# Patient Record
Sex: FEMALE | Race: WHITE | Employment: OTHER | URBAN - METROPOLITAN AREA
[De-identification: names, ages, dates, MRNs, and addresses within clinical notes are randomized per-mention and may not be internally consistent; named-entity substitution may affect disease eponyms.]

---

## 2017-01-03 ENCOUNTER — TELEPHONE (OUTPATIENT)
Dept: PALLATIVE CARE | Age: 82
End: 2017-01-03

## 2017-01-03 ENCOUNTER — NURSE NAVIGATOR (OUTPATIENT)
Dept: PALLATIVE CARE | Age: 82
End: 2017-01-03

## 2017-01-03 NOTE — PROGRESS NOTES
[facility]  Palliative Medicine Office  Nursing Note  [phone]  Fax [phone]    Patient Name: Fredy Sal  YOB: 1929      1/3/2017    Verified  and address with  Reid Cash her son as identifiers. Calling son back after he left several messages about Mrs. Patino having diarrhea for several days, when I called him back he stated patient did not have diarrhea and was going fine. He went on to tell me that she had diarrhea due to the fact he was feeding her the wrong type of foods. He was rambling about many issues, his diarrhea, his sister feeling he is not caring for his mom correctly, how \"they\" want to take his mom and put her in a home and what did i know about that. I tried to focus him on the issue at hand and the fact that it is better now. When he has medical issues I advised him to call Mrs. Patino PCA when medical issues arise with her         Tian Llamas, RN Nurse home visit   Palliative Medicine        No future appointments.

## 2017-01-03 NOTE — TELEPHONE ENCOUNTER
Patient's son, Cary Clark, called and left 3 voicemails on machine advising that he needs advice regarding patient. He states on machine that \"Patient has diarrhea and this has been going on for 3 days. He does not know what to do. \"  Calling for advise.

## 2017-01-06 RX ORDER — IPRATROPIUM BROMIDE AND ALBUTEROL SULFATE 2.5; .5 MG/3ML; MG/3ML
SOLUTION RESPIRATORY (INHALATION)
Qty: 270 ML | Refills: 0 | Status: SHIPPED | OUTPATIENT
Start: 2017-01-06 | End: 2017-03-01 | Stop reason: SDUPTHER

## 2017-01-23 ENCOUNTER — PATIENT OUTREACH (OUTPATIENT)
Dept: FAMILY MEDICINE CLINIC | Age: 82
End: 2017-01-23

## 2017-01-23 NOTE — PROGRESS NOTES
Pt. Being followed by NN in Palliative care. See her notes. SW with Taty Brennan [facility] has also made contact with pt. And APS, see her extensive notes. NP has been made aware of above in the past and has had discussions with the SONIYA Rendon

## 2017-01-27 ENCOUNTER — DOCUMENTATION ONLY (OUTPATIENT)
Dept: PALLATIVE CARE | Age: 82
End: 2017-01-27

## 2017-01-27 NOTE — PROGRESS NOTES
Telephone Call    Purpose of Call:  Return Son/Caregiver Hakeem Cool) Call    This \[facility]\""W returned call from pt's son Elsi Gomez). Described self as a \"wreck. \" Described in detail concerns about mother's care---wants to hire a home health aide to help; has been in contact with PCP with concerns about rash mother has; upset over lack of support from his siblings. \"No one will talk to me. \" Fears brother Abhay Smith ([employer] [facility] and [place]) is going to sell mothers house. Anxiety over being homeless. These concerns are ongoing. Mr. Dakotah Nguyen feels powerless to deal with these stresses. Redirected him to PCP. Pt no longer to be followed by Palliative. At this time, she no longer meets guidelines of clinic's COPE model (Care decisions, Overwhelming symptoms, Psychosocial disress, and End-stage disease). Plan:   1. Be available to consult with MSW (Manoj Godinez), upon request.      Rosalee Jain.  Silke Peace., [facility]  [phone]

## 2017-01-28 RX ORDER — ACETAMINOPHEN AND CODEINE PHOSPHATE 300; 30 MG/1; MG/1
1 TABLET ORAL
Qty: 30 TAB | Refills: 0 | OUTPATIENT
Start: 2017-01-28 | End: 2017-03-01 | Stop reason: SDUPTHER

## 2017-01-30 ENCOUNTER — HOME HEALTH ADMISSION (OUTPATIENT)
Dept: HOME HEALTH SERVICES | Facility: HOME HEALTH | Age: 82
End: 2017-01-30
Payer: MEDICARE

## 2017-02-02 ENCOUNTER — HOME CARE VISIT (OUTPATIENT)
Dept: SCHEDULING | Facility: HOME HEALTH | Age: 82
End: 2017-02-02
Payer: MEDICARE

## 2017-02-02 DIAGNOSIS — Z74.2 NEED FOR HOME HEALTH CARE: Primary | ICD-10-CM

## 2017-02-02 PROCEDURE — 3331090001 HH PPS REVENUE CREDIT

## 2017-02-02 PROCEDURE — 3331090003 HH PPS REVENUE ADJ

## 2017-02-02 PROCEDURE — 400013 HH SOC

## 2017-02-02 PROCEDURE — G0151 HHCP-SERV OF PT,EA 15 MIN: HCPCS

## 2017-02-02 PROCEDURE — 3331090002 HH PPS REVENUE DEBIT

## 2017-02-03 ENCOUNTER — TELEPHONE (OUTPATIENT)
Dept: FAMILY MEDICINE CLINIC | Age: 82
End: 2017-02-03

## 2017-02-03 PROCEDURE — 3331090002 HH PPS REVENUE DEBIT

## 2017-02-03 PROCEDURE — 3331090001 HH PPS REVENUE CREDIT

## 2017-02-03 NOTE — TELEPHONE ENCOUNTER
Outbound call to Italo Michelle RN [facility]), pt identifiers (name & MRN) verified per HIPAA policy. Acknowledged self, role and nature of the call, RN acknowledged understanding. Clinician informed RN of new referral placed in CC for the [facility].      Per Italo Michelle [facility] referrals have been suspended for now per Dr. Sarthak Kinsey. Don't have the staff to accommodate work load\".   Writer verbally agreed to check back w/RN re: referral to the [facility] [address] [facility] TYSON Beltran

## 2017-02-04 PROCEDURE — 3331090002 HH PPS REVENUE DEBIT

## 2017-02-04 PROCEDURE — 3331090001 HH PPS REVENUE CREDIT

## 2017-02-05 PROCEDURE — 3331090001 HH PPS REVENUE CREDIT

## 2017-02-05 PROCEDURE — 3331090002 HH PPS REVENUE DEBIT

## 2017-02-06 PROCEDURE — 3331090001 HH PPS REVENUE CREDIT

## 2017-02-06 PROCEDURE — 3331090002 HH PPS REVENUE DEBIT

## 2017-02-07 PROCEDURE — 3331090001 HH PPS REVENUE CREDIT

## 2017-02-07 PROCEDURE — 3331090002 HH PPS REVENUE DEBIT

## 2017-02-08 PROCEDURE — 3331090001 HH PPS REVENUE CREDIT

## 2017-02-08 PROCEDURE — 3331090002 HH PPS REVENUE DEBIT

## 2017-02-09 PROCEDURE — 3331090002 HH PPS REVENUE DEBIT

## 2017-02-09 PROCEDURE — 3331090001 HH PPS REVENUE CREDIT

## 2017-02-10 PROCEDURE — 3331090002 HH PPS REVENUE DEBIT

## 2017-02-10 PROCEDURE — 3331090001 HH PPS REVENUE CREDIT

## 2017-02-11 PROCEDURE — 3331090001 HH PPS REVENUE CREDIT

## 2017-02-11 PROCEDURE — 3331090002 HH PPS REVENUE DEBIT

## 2017-02-12 PROCEDURE — 3331090002 HH PPS REVENUE DEBIT

## 2017-02-12 PROCEDURE — 3331090001 HH PPS REVENUE CREDIT

## 2017-02-13 PROCEDURE — 3331090002 HH PPS REVENUE DEBIT

## 2017-02-13 PROCEDURE — 3331090001 HH PPS REVENUE CREDIT

## 2017-02-14 ENCOUNTER — TELEPHONE (OUTPATIENT)
Dept: PALLATIVE CARE | Age: 82
End: 2017-02-14

## 2017-02-14 PROCEDURE — 3331090001 HH PPS REVENUE CREDIT

## 2017-02-14 PROCEDURE — 3331090002 HH PPS REVENUE DEBIT

## 2017-02-14 NOTE — TELEPHONE ENCOUNTER
Pt's son Terrence White called asking for transportation phone number for [facility] [employer]. States he needs to call and arrange transportation through [facility] [employer] for his mother to go to the MD.  During the call son says he found the [facility] [employer] card with the number on it. Nurse inquired about pt's homebound status. Pt had been referred to Home Based Primary Care on 2/3/17 by Mendez Zurita.  Son states he can use the wheelchair and get his mother to medical appts \"no problem. \"

## 2017-02-15 PROCEDURE — 3331090002 HH PPS REVENUE DEBIT

## 2017-02-15 PROCEDURE — 3331090001 HH PPS REVENUE CREDIT

## 2017-02-16 PROCEDURE — 3331090001 HH PPS REVENUE CREDIT

## 2017-02-16 PROCEDURE — 3331090002 HH PPS REVENUE DEBIT

## 2017-02-17 PROCEDURE — 3331090001 HH PPS REVENUE CREDIT

## 2017-02-17 PROCEDURE — 3331090002 HH PPS REVENUE DEBIT

## 2017-02-18 PROCEDURE — 3331090002 HH PPS REVENUE DEBIT

## 2017-02-18 PROCEDURE — 3331090001 HH PPS REVENUE CREDIT

## 2017-02-19 PROCEDURE — 3331090001 HH PPS REVENUE CREDIT

## 2017-02-19 PROCEDURE — 3331090002 HH PPS REVENUE DEBIT

## 2017-02-20 PROCEDURE — 3331090002 HH PPS REVENUE DEBIT

## 2017-02-20 PROCEDURE — 3331090001 HH PPS REVENUE CREDIT

## 2017-02-21 ENCOUNTER — HOME CARE VISIT (OUTPATIENT)
Dept: HOME HEALTH SERVICES | Facility: HOME HEALTH | Age: 82
End: 2017-02-21
Payer: MEDICARE

## 2017-02-21 PROCEDURE — 3331090002 HH PPS REVENUE DEBIT

## 2017-02-21 PROCEDURE — 3331090001 HH PPS REVENUE CREDIT

## 2017-02-22 PROCEDURE — 3331090001 HH PPS REVENUE CREDIT

## 2017-02-22 PROCEDURE — 3331090002 HH PPS REVENUE DEBIT

## 2017-02-23 PROCEDURE — 3331090001 HH PPS REVENUE CREDIT

## 2017-02-23 PROCEDURE — 3331090002 HH PPS REVENUE DEBIT

## 2017-02-24 PROCEDURE — 3331090001 HH PPS REVENUE CREDIT

## 2017-02-24 PROCEDURE — 3331090002 HH PPS REVENUE DEBIT

## 2017-02-25 PROCEDURE — 3331090002 HH PPS REVENUE DEBIT

## 2017-02-25 PROCEDURE — 3331090001 HH PPS REVENUE CREDIT

## 2017-02-26 PROCEDURE — 3331090002 HH PPS REVENUE DEBIT

## 2017-02-26 PROCEDURE — 3331090001 HH PPS REVENUE CREDIT

## 2017-02-27 PROCEDURE — 3331090002 HH PPS REVENUE DEBIT

## 2017-02-27 PROCEDURE — 3331090001 HH PPS REVENUE CREDIT

## 2017-02-28 PROCEDURE — 3331090002 HH PPS REVENUE DEBIT

## 2017-02-28 PROCEDURE — 3331090001 HH PPS REVENUE CREDIT

## 2017-03-01 PROCEDURE — 3331090002 HH PPS REVENUE DEBIT

## 2017-03-01 PROCEDURE — 3331090001 HH PPS REVENUE CREDIT

## 2017-03-01 RX ORDER — ACETAMINOPHEN AND CODEINE PHOSPHATE 300; 30 MG/1; MG/1
TABLET ORAL
Qty: 30 TAB | Refills: 0 | Status: SHIPPED | OUTPATIENT
Start: 2017-03-01 | End: 2017-03-03 | Stop reason: ALTCHOICE

## 2017-03-01 RX ORDER — IPRATROPIUM BROMIDE AND ALBUTEROL SULFATE 2.5; .5 MG/3ML; MG/3ML
SOLUTION RESPIRATORY (INHALATION)
Qty: 270 ML | Refills: 0 | Status: SHIPPED | OUTPATIENT
Start: 2017-03-01 | End: 2017-05-03 | Stop reason: SDUPTHER

## 2017-03-02 PROCEDURE — 3331090002 HH PPS REVENUE DEBIT

## 2017-03-02 PROCEDURE — 3331090001 HH PPS REVENUE CREDIT

## 2017-03-03 ENCOUNTER — OFFICE VISIT (OUTPATIENT)
Dept: FAMILY MEDICINE CLINIC | Age: 82
End: 2017-03-03

## 2017-03-03 VITALS
RESPIRATION RATE: 14 BRPM | OXYGEN SATURATION: 91 % | BODY MASS INDEX: 27.53 KG/M2 | HEART RATE: 100 BPM | HEIGHT: 61 IN | TEMPERATURE: 98.1 F | DIASTOLIC BLOOD PRESSURE: 74 MMHG | WEIGHT: 145.8 LBS | SYSTOLIC BLOOD PRESSURE: 107 MMHG

## 2017-03-03 DIAGNOSIS — R79.81 LOW O2 SATURATION: ICD-10-CM

## 2017-03-03 DIAGNOSIS — J44.9 CHRONIC OBSTRUCTIVE PULMONARY DISEASE, UNSPECIFIED COPD TYPE (HCC): ICD-10-CM

## 2017-03-03 DIAGNOSIS — M53.3 SACRAL PAIN: Primary | ICD-10-CM

## 2017-03-03 DIAGNOSIS — F03.90 DEMENTIA WITHOUT BEHAVIORAL DISTURBANCE, UNSPECIFIED DEMENTIA TYPE: ICD-10-CM

## 2017-03-03 DIAGNOSIS — M62.81 MUSCLE WEAKNESS OF EXTREMITY: ICD-10-CM

## 2017-03-03 PROCEDURE — 3331090001 HH PPS REVENUE CREDIT

## 2017-03-03 PROCEDURE — 3331090002 HH PPS REVENUE DEBIT

## 2017-03-03 RX ORDER — BACLOFEN 10 MG/1
10 TABLET ORAL 2 TIMES DAILY
Qty: 60 TAB | Refills: 1 | Status: SHIPPED | OUTPATIENT
Start: 2017-03-03 | End: 2017-05-02 | Stop reason: SDUPTHER

## 2017-03-03 NOTE — MR AVS SNAPSHOT
Visit Information Date & Time Provider Department Dept. Phone Encounter #  
 3/3/2017 12:00 PM Selene Fuentes [facility] [phone] [id number] Follow-up Instructions Return for 2-3 month recheck and labs . Upcoming Health Maintenance Date Due  
 GLAUCOMA SCREENING Q2Y 9/18/1994 OSTEOPOROSIS SCREENING (DEXA) 9/18/1994 MEDICARE YEARLY EXAM 6/11/2017 DTaP/Tdap/Td series (2 - Td) 3/3/2027 Allergies as of 3/3/2017  Review Complete On: 3/3/2017 By: Selene Fuentes NP Severity Noted Reaction Type Reactions Pcn [Penicillins]  07/01/2011    Unknown (comments) Zoloft [Sertraline]  07/01/2011    Unknown (comments) Current Immunizations  Reviewed on 12/3/2015 Name Date Influenza High Dose Vaccine PF 12/3/2015 Pneumococcal Conjugate (PCV-13) 12/3/2015 Pneumococcal Vaccine (Unspecified Type) 7/1/2000 Not reviewed this visit You Were Diagnosed With   
  
 Codes Comments Sacral pain    -  Primary ICD-10-CM: M53.3 ICD-9-CM: 724.6 Chronic obstructive pulmonary disease, unspecified COPD type ([facility]     ICD-10-CM: J44.9 ICD-9-CM: 166 Dementia without behavioral disturbance, unspecified dementia type     ICD-10-CM: F03.90 ICD-9-CM: 294.20 Muscle weakness of extremity     ICD-10-CM: M62.81 ICD-9-CM: 728.87 Vitals BP  
  
  
  
  
  
 107/74 Vitals History BMI and BSA Data Body Mass Index Body Surface Area  
 27.55 kg/m 2 1.69 m 2 Preferred Pharmacy Pharmacy Name Phone [id number] [address], [address] [phone] Your Updated Medication List  
  
   
This list is accurate as of: 3/3/17 12:45 PM.  Always use your most recent med list.  
  
  
  
  
 * albuterol-ipratropium 2.5 mg-0.5 mg/3 ml Nebu Commonly known as:  Alana Murillo INHALE ONE VIAL VIA NEBULIZER ONCE A DAY  
  
 * albuterol-ipratropium 2.5 mg-0.5 mg/3 ml Nebu Commonly known as:  Mary Thomas INHALE ONE VIAL VIA NEBULIZER ONCE A DAY  
  
 baclofen 10 mg tablet Commonly known as:  LIORESAL Take 1 Tab by mouth two (2) times a day. As needed for back pain/spasms FLUoxetine 10 mg capsule Commonly known as:  PROzac TAKE 1 CAPSULE EVERY DAY  
  
 fluticasone-salmeterol 250-50 mcg/dose diskus inhaler Commonly known as:  ADVAIR Take 1 Puff by inhalation two (2) times a day. inhalational spacing device 1 each by Does Not Apply route as needed (use with combivent). levothyroxine 50 mcg tablet Commonly known as:  SYNTHROID  
  
 multivitamin tablet Commonly known as:  ONE A DAY TAKE 1 TABLET EVERY DAY Nebulizer & Compressor machine 1 Each by Does Not Apply route four (4) times daily as needed (wheezing/ coughing spasms). Machine and supplies need to be delivered to [facility] Nebulizer Accessories Kit 4 x a day use for copd and wheezing/coughing spasms. Supplies need to be delivered to [facility]  
  
 nystatin powder Commonly known as:  MYCOSTATIN Apply  to affected area two (2) times a day. Apply under breasts bid x 2 weeks then prn. Please call pt when rx is ready for   
  
 omeprazole 20 mg capsule Commonly known as:  PRILOSEC  
TAKE ONE CAPSULE BY MOUTH ONCE DAILY PROAIR HFA 90 mcg/actuation inhaler Generic drug:  albuterol TAKE 2 PUFFS BY INHALATION EVERY SIX (6) HOURS AS NEEDED FOR WHEEZING OR SHORTNESS OF BREATH.  
  
 therapeutic multivitamin tablet Commonly known as:  [facility] Take 1 Tab by mouth daily. tiotropium 18 mcg inhalation capsule Commonly known as:  Daysi Oanh Take 1 Cap by inhalation daily. zinc oxide 25 % Pste Use as directed 2 x day * Notice: This list has 2 medication(s) that are the same as other medications prescribed for you.  Read the directions carefully, and ask your doctor or other care provider to review them with you. Prescriptions Sent to Pharmacy Refills  
 baclofen (LIORESAL) 10 mg tablet 1 Sig: Take 1 Tab by mouth two (2) times a day. As needed for back pain/spasms Class: Normal  
 Pharmacy: [facility] #: [phone] Route: Oral  
  
Follow-up Instructions Return for 2-3 month recheck and labs . Introducing [facility] SERVICES! Dear Pawan Hyde: 
Thank you for requesting a [email or web address] account. Our records indicate that you already have an active [email or web address] account. You can access your account anytime at [email or web address] Did you know that you can access your hospital and ER discharge instructions at any time in [email or web address]? You can also review all of your test results from your hospital stay or ER visit. Additional Information If you have questions, please visit the Frequently Asked Questions section of the [email or web address] website at [email or web address]/. Remember, [email or web address] is NOT to be used for urgent needs. For medical emergencies, dial 911. Now available from your iPhone and Android! Please provide this summary of care documentation to your next provider. Your primary care clinician is listed as Alexandre Marion. If you have any questions after today's visit, please call [phone].

## 2017-03-03 NOTE — PROGRESS NOTES
HISTORY OF PRESENT ILLNESS  Gloria Henderson is a 80 y.o. female. HPI  Chief Complaint   Patient presents with    COPD     Follow up. Gloria Henderson is a 80 y.o. female here to fu on her copd and sacral pain as well as face to face for hospital bed. Pt presents to office today with her son Deny Pacheco) [facility] today. Pt's son reports that she has been complaining of something being stuck in her throat. She states this does not feel too bad now. She did not bring her portable o2 today due to issues with the [employer] services per shira    Also he reports she mostly just lays in bed most of the day. Eats in bed. Has a hard time getting up and about due to weakness in arms and legs and poor endurance. She is not motivated to get out of bed. She does complain of sacral pain which is chronic. We discussed this via phone a few weeks ago and added tylenol #3 for her to use prn for this. He reports it does not seem to help the pain. States her son rubbing her back helps her pain more. He will continue to do this. She also has been letting him give her the inhalers and her breathing has been good. She still refuses to wear her 02 otc. She is not choking on water or fluids,     Left elbow had a small abrasion but that is now healed. He has been padding it with bandages    Skin on sacral area-reports no open areas. He uses desitin when her skin is appearing red and this does help. Current Outpatient Prescriptions   Medication Sig Dispense Refill    baclofen (LIORESAL) 10 mg tablet Take 1 Tab by mouth two (2) times a day.  As needed for back pain/spasms 60 Tab 1    albuterol-ipratropium (DUO-NEB) 2.5 mg-0.5 mg/3 ml nebu INHALE ONE VIAL VIA NEBULIZER ONCE A  mL 0    FLUoxetine (PROZAC) 10 mg capsule TAKE 1 CAPSULE EVERY DAY 30 Cap 5    multivitamin (ONE A DAY) tablet TAKE 1 TABLET EVERY DAY 90 Tab 3    albuterol-ipratropium (DUO-NEB) 2.5 mg-0.5 mg/3 ml nebu INHALE ONE VIAL VIA NEBULIZER ONCE A  mL 0    zinc oxide 25 % pste Use as directed 2 x day 30 g 2    levothyroxine (SYNTHROID) 50 mcg tablet       nystatin (MYCOSTATIN) powder Apply  to affected area two (2) times a day. Apply under breasts bid x 2 weeks then prn. Please call pt when rx is ready for  60 g 11    therapeutic multivitamin (THERAGRAN) tablet Take 1 Tab by mouth daily. 90 Tab 3    tiotropium (SPIRIVA) 18 mcg inhalation capsule Take 1 Cap by inhalation daily. 30 Cap 5    fluticasone-salmeterol (ADVAIR) 250-50 mcg/dose diskus inhaler Take 1 Puff by inhalation two (2) times a day. 1 Inhaler 5    PROAIR HFA 90 mcg/actuation inhaler TAKE 2 PUFFS BY INHALATION EVERY SIX (6) HOURS AS NEEDED FOR WHEEZING OR SHORTNESS OF BREATH. 8.5 Inhaler 0    omeprazole (PRILOSEC) 20 mg capsule TAKE ONE CAPSULE BY MOUTH ONCE DAILY 30 Cap 1    Nebulizer & Compressor machine 1 Each by Does Not Apply route four (4) times daily as needed (wheezing/ coughing spasms). Machine and supplies need to be delivered to [facility] 1 Each 0    Nebulizer Accessories kit 4 x a day use for copd and wheezing/coughing spasms. Supplies need to be delivered to [facility] 1 Kit 0    inhalational spacing device 1 each by Does Not Apply route as needed (use with combivent). 1 Device 3     Allergies   Allergen Reactions    Pcn [Penicillins] Unknown (comments)    Zoloft [Sertraline] Unknown (comments)     Past Medical History:   Diagnosis Date    Compression fracture     COPD (chronic obstructive pulmonary disease) ([facility] 12/3/2015    Depression 7/1/2011    Rheumatoid arthritis(714.0) 7/1/2011    Unspecified hypothyroidism 7/1/2011     History reviewed. No pertinent surgical history.   Family History   Problem Relation Age of Onset    Heart Disease Mother     Migraines Father     Lung Disease Son      Social History   Substance Use Topics    Smoking status: Former Smoker    Smokeless tobacco: Never Used    Alcohol use No       Review of Systems Constitutional: Negative for chills, diaphoresis, fever, malaise/fatigue and weight loss. HENT: Positive for hearing loss (chronic ). Negative for congestion, ear discharge, ear pain, nosebleeds, sore throat and tinnitus. Eyes: Negative for blurred vision, photophobia, pain, discharge and redness. Respiratory: Positive for shortness of breath. Negative for cough, hemoptysis, sputum production, wheezing and stridor. Cardiovascular: Negative for chest pain, palpitations, orthopnea and leg swelling. Gastrointestinal: Negative for abdominal pain, blood in stool, constipation, diarrhea, melena, nausea and vomiting. Bowel incontinence. Genitourinary:        Urinary incontinence. Musculoskeletal: Positive for back pain. Negative for falls, joint pain, myalgias and neck pain. Neurological: Positive for focal weakness and weakness. Negative for dizziness, tingling, tremors, sensory change, speech change, seizures, loss of consciousness and headaches. Psychiatric/Behavioral: Positive for memory loss (chronic ). Mood appears stable    All other systems reviewed and are negative. Physical Exam   Constitutional: She is oriented to person, place, and time. In wheelchair. She is very hard of hearing but is responsive to verbal questioning when it is directed specifically at her. HENT:   Head: Normocephalic and atraumatic. Right Ear: External ear normal. Tympanic membrane is not bulging. No middle ear effusion. Left Ear: External ear normal. Tympanic membrane is not bulging. No middle ear effusion. Nose: Nose normal. Right sinus exhibits no maxillary sinus tenderness and no frontal sinus tenderness. Left sinus exhibits no maxillary sinus tenderness and no frontal sinus tenderness. Mouth/Throat: Oropharynx is clear and moist. No oropharyngeal exudate. Decreased hearing    Eyes: Conjunctivae and EOM are normal. Pupils are equal, round, and reactive to light.  Right eye exhibits no discharge. Left eye exhibits no discharge. No scleral icterus. Neck: Normal range of motion. Neck supple. No thyromegaly present. Cardiovascular: Normal rate, regular rhythm, normal heart sounds and intact distal pulses. Pulmonary/Chest: Breath sounds normal. No stridor. No respiratory distress. She has no wheezes. She has no rales. She exhibits no tenderness. Abdominal: Bowel sounds are normal. She exhibits no distension and no mass. There is no tenderness. There is no rebound and no guarding. Musculoskeletal: She exhibits tenderness (she has muscle tension/mild spams of lumbar paravertebral muscles. ). Generalized weakness of bilateral upper and lower extremity, unable to bear weight without significant assistance. Poor balance noted. Lymphadenopathy:     She has no cervical adenopathy. Neurological: She is alert and oriented to person, place, and time. Skin: Skin is warm and dry. No rash (no significant erythema on buttocks or sacral area. no open areas on skin otherwise including elbows  ) noted. 1 mm area of stage 2 on left elbow, surrounding stage 1 less than 1 cm. Area is non tender. No s/s of infection    Psychiatric: She is slowed and withdrawn. Thought content is not delusional (unable to assess ). Cognition and memory are impaired. She exhibits a depressed mood (unable to assess ). She expresses no suicidal (unable to assess ) ideation. She is noncommunicative (short verbal responses (pressured) with direct questioning ). Nursing note and vitals reviewed. ASSESSMENT and PLAN    ICD-10-CM ICD-9-CM    1. Sacral pain M53.3 724.6 baclofen (LIORESAL) 10 mg tablet   2. Chronic obstructive pulmonary disease, unspecified COPD type ([facility] J44.9 496    3. Dementia without behavioral disturbance, unspecified dementia type F03.90 294.20    4. Muscle weakness of extremity M62.81 728.87    5.  Low O2 saturation R79.81 790.91      [facility] was seen today for copd.    Diagnoses and all orders for this visit:    Sacral pain  Muscle weakness of extremity    -   Dc the tylenol 3 as this does not seem to be helping. Pain seems to be more so related to muscle spasms, will do trial of  baclofen (LIORESAL) 10 mg tablet; Take 1 Tab by mouth two (2) times a day. As needed for back pain/spasms  Of course this is one of the reasons she would greatly benefit from a semi-electric hospital bed as needs an adjustable bed due to her back condition and pain, pain with position changes in she has inability to change her own positioning due to extremity weakness. She has the need for frequent position changes. It would also be recommended she has the head of the bed raise to 30 degrees due to her respiratory condition. Continue with efforts to change her bed positioning frequently as Hector Kirkland is doing to help prevent bed sores as well as massage and frequent skin checks. Reassured shira he seems to be doing a great job keeping up with her needs at this time. Will see them back in 2-3 months to re britney.   Chronic obstructive pulmonary disease, unspecified COPD type ([address]  Low O2 saturation  She is doing better with inhaler use and lungs are clear today   She often times refuses to wear her o2 continuously, however, she does de-sat down into the 80s without it. Recommended he makes efforts to bring it with them on out of home excursions. (he states he will do that). Dementia without behavioral disturbance, unspecified dementia type  She has moderate to severe dementia. This does not appear to be changed from her last ov. She continues to be responsive but only when directly questioned, generally she defers to Hector Kirkland for everything. Follow-up Disposition:  Return for 2-3 month recheck and labs .

## 2017-03-03 NOTE — PROGRESS NOTES
Pt presents to office today with her son Pamela Goyal) for a follow up on COPD. Pt's son reports that she has been complaining of something being stuck in her throat. She states this does not feel too bad now. Pt states   Chief Complaint   Patient presents with    COPD     Follow up. 1. Have you been to the ER, urgent care clinic since your last visit? Hospitalized since your last visit? No    2. Have you seen or consulted any other health care providers outside of the [address] [place] [address] since your last visit? Include any pap smears or colon screening.  No

## 2017-03-04 PROCEDURE — 3331090001 HH PPS REVENUE CREDIT

## 2017-03-04 PROCEDURE — 3331090002 HH PPS REVENUE DEBIT

## 2017-03-05 PROCEDURE — 3331090001 HH PPS REVENUE CREDIT

## 2017-03-05 PROCEDURE — 3331090002 HH PPS REVENUE DEBIT

## 2017-03-06 PROCEDURE — 3331090001 HH PPS REVENUE CREDIT

## 2017-03-06 PROCEDURE — 3331090002 HH PPS REVENUE DEBIT

## 2017-03-07 PROCEDURE — 3331090002 HH PPS REVENUE DEBIT

## 2017-03-07 PROCEDURE — 3331090001 HH PPS REVENUE CREDIT

## 2017-03-08 PROCEDURE — 3331090002 HH PPS REVENUE DEBIT

## 2017-03-08 PROCEDURE — 3331090001 HH PPS REVENUE CREDIT

## 2017-03-09 PROCEDURE — 3331090002 HH PPS REVENUE DEBIT

## 2017-03-09 PROCEDURE — 3331090001 HH PPS REVENUE CREDIT

## 2017-03-10 PROCEDURE — 3331090001 HH PPS REVENUE CREDIT

## 2017-03-10 PROCEDURE — 3331090002 HH PPS REVENUE DEBIT

## 2017-03-11 PROCEDURE — 3331090001 HH PPS REVENUE CREDIT

## 2017-03-11 PROCEDURE — 3331090002 HH PPS REVENUE DEBIT

## 2017-03-12 PROCEDURE — 3331090002 HH PPS REVENUE DEBIT

## 2017-03-12 PROCEDURE — 3331090001 HH PPS REVENUE CREDIT

## 2017-03-13 PROCEDURE — 3331090001 HH PPS REVENUE CREDIT

## 2017-03-13 PROCEDURE — 3331090002 HH PPS REVENUE DEBIT

## 2017-03-14 PROCEDURE — 3331090001 HH PPS REVENUE CREDIT

## 2017-03-14 PROCEDURE — 3331090002 HH PPS REVENUE DEBIT

## 2017-03-15 PROCEDURE — 3331090001 HH PPS REVENUE CREDIT

## 2017-03-15 PROCEDURE — 3331090002 HH PPS REVENUE DEBIT

## 2017-03-16 PROCEDURE — 3331090001 HH PPS REVENUE CREDIT

## 2017-03-16 PROCEDURE — 3331090002 HH PPS REVENUE DEBIT

## 2017-03-17 PROCEDURE — 3331090002 HH PPS REVENUE DEBIT

## 2017-03-17 PROCEDURE — 3331090001 HH PPS REVENUE CREDIT

## 2017-03-18 PROCEDURE — 3331090002 HH PPS REVENUE DEBIT

## 2017-03-18 PROCEDURE — 3331090001 HH PPS REVENUE CREDIT

## 2017-03-19 PROCEDURE — 3331090002 HH PPS REVENUE DEBIT

## 2017-03-19 PROCEDURE — 3331090001 HH PPS REVENUE CREDIT

## 2017-03-20 PROCEDURE — 3331090001 HH PPS REVENUE CREDIT

## 2017-03-20 PROCEDURE — 3331090002 HH PPS REVENUE DEBIT

## 2017-04-03 ENCOUNTER — DOCUMENTATION ONLY (OUTPATIENT)
Dept: PALLATIVE CARE | Age: 82
End: 2017-04-03

## 2017-04-03 NOTE — PROGRESS NOTES
Telephone Call    Purpose of Call:  Return Call from Pt's Son/PCG Shawanda Cruz)    [facility] Mr. Patino, pt's son and primary care giver ([phone]. He said he could not remember why he called; stated \"everything is all right. \" SW reminded him that his mother is not at Palliative Medicine patient at this time. Referred him to [facility] in [facility] office (Marlaine Later), who has been assisting the family and providing psychosocial support. No further action at this time. Karlos Counter  Tiffani Garcia., [facility]  [phone]

## 2017-04-28 ENCOUNTER — TELEPHONE (OUTPATIENT)
Dept: PALLATIVE CARE | Age: 82
End: 2017-04-28

## 2017-04-28 NOTE — TELEPHONE ENCOUNTER
Patient's son Beau Link is calling asking to speak to nurse and/or  that came to home to visit patient and himself. He would like a call back to day. States he has a lot of issues to discuss.

## 2017-04-28 NOTE — TELEPHONE ENCOUNTER
Talked to son and explained we are no longer involved in Mrs. Patino care and referred him back to the PCP office.

## 2017-05-01 ENCOUNTER — TELEPHONE (OUTPATIENT)
Dept: FAMILY MEDICINE CLINIC | Age: 82
End: 2017-05-01

## 2017-05-01 NOTE — TELEPHONE ENCOUNTER
Outbound call to pt's son Maria G Irizarry, informed him that [address] form was received, Maria G Irizarry wants form to be completed and faxed to [facility]. He states he will get fax number and call back and leave it for nurse. Maria G Irizarry also states pt c/o pain under her left breast, he states he is unsure if pt has a redness or rash to affected area. He states he will check pt's skin and call back with findings.

## 2017-05-01 NOTE — TELEPHONE ENCOUNTER
----- Message from Smith Barone sent at 5/1/2017 10:43 AM EDT -----  Regarding: Dr. Hugo Saha  Patient's son, Amna Murrieta, would like to know if the office received his fax for a medical form for [facility] [employer] regarding medical equipment. He would like to see if it was faxed back over to [facility] [employer]. They have not received anything. He would like a call back today so he can confirm with [facility] [employer]. Best contact number for Amna Murrieta is [phone].

## 2017-05-01 NOTE — TELEPHONE ENCOUNTER
Patients son, Mamadou Ledesma is calling back with the fax number for [facility] for [employer].     Fax# [phone]  Best call back # for Mamadou Ledesma if needed: [phone]

## 2017-05-02 ENCOUNTER — TELEPHONE (OUTPATIENT)
Dept: FAMILY MEDICINE CLINIC | Age: 82
End: 2017-05-02

## 2017-05-02 DIAGNOSIS — M53.3 SACRAL PAIN: ICD-10-CM

## 2017-05-02 NOTE — TELEPHONE ENCOUNTER
Contact # is [phone]    Patient's son, Yun Belcher, states that patient will be in tomorrow at 10:20am as long as transport will be able to bring them. He states that he will bring her oxygen. He states he though patient's rash was improving after he put Desitin under the breast, however, the pain is back so they will be coming in.  Yun Belcher is requesting a call once the nurse has a free moment

## 2017-05-02 NOTE — TELEPHONE ENCOUNTER
----- Message from Samuel Jordan sent at 5/2/2017  2:52 PM EDT -----  Regarding: NP.[facility]/Telephone  Pt is just confirming upcoming 05/03/17 appointment and just wanted to inform a transporting service will be bring her.

## 2017-05-03 ENCOUNTER — OFFICE VISIT (OUTPATIENT)
Dept: FAMILY MEDICINE CLINIC | Age: 82
End: 2017-05-03

## 2017-05-03 VITALS
TEMPERATURE: 98.3 F | OXYGEN SATURATION: 95 % | DIASTOLIC BLOOD PRESSURE: 70 MMHG | SYSTOLIC BLOOD PRESSURE: 112 MMHG | RESPIRATION RATE: 14 BRPM | WEIGHT: 145 LBS | HEIGHT: 61 IN | HEART RATE: 93 BPM | BODY MASS INDEX: 27.38 KG/M2

## 2017-05-03 DIAGNOSIS — J44.9 CHRONIC OBSTRUCTIVE PULMONARY DISEASE, UNSPECIFIED COPD TYPE (HCC): ICD-10-CM

## 2017-05-03 DIAGNOSIS — B37.2 CUTANEOUS CANDIDIASIS: ICD-10-CM

## 2017-05-03 DIAGNOSIS — E03.1 CONGENITAL HYPOTHYROIDISM WITHOUT GOITER: ICD-10-CM

## 2017-05-03 DIAGNOSIS — M53.3 SACRAL PAIN: Primary | ICD-10-CM

## 2017-05-03 DIAGNOSIS — R06.2 WHEEZING: ICD-10-CM

## 2017-05-03 DIAGNOSIS — R15.9 BOWEL AND BLADDER INCONTINENCE: ICD-10-CM

## 2017-05-03 DIAGNOSIS — R32 BOWEL AND BLADDER INCONTINENCE: ICD-10-CM

## 2017-05-03 DIAGNOSIS — Z86.2 HISTORY OF ANEMIA: ICD-10-CM

## 2017-05-03 RX ORDER — IPRATROPIUM BROMIDE AND ALBUTEROL SULFATE 2.5; .5 MG/3ML; MG/3ML
3 SOLUTION RESPIRATORY (INHALATION)
Qty: 270 ML | Refills: 2 | Status: SHIPPED | OUTPATIENT
Start: 2017-05-03 | End: 2017-11-09 | Stop reason: SDUPTHER

## 2017-05-03 RX ORDER — ALBUTEROL SULFATE 90 UG/1
AEROSOL, METERED RESPIRATORY (INHALATION)
Qty: 8.5 INHALER | Refills: 5 | Status: SHIPPED | OUTPATIENT
Start: 2017-05-03

## 2017-05-03 RX ORDER — BACLOFEN 10 MG/1
TABLET ORAL
Qty: 60 TAB | Refills: 1 | Status: SHIPPED | OUTPATIENT
Start: 2017-05-03

## 2017-05-03 RX ORDER — NYSTATIN 100000 U/G
CREAM TOPICAL 2 TIMES DAILY
Qty: 15 G | Refills: 11 | Status: SHIPPED | OUTPATIENT
Start: 2017-05-03

## 2017-05-03 RX ORDER — FLUTICASONE PROPIONATE AND SALMETEROL 250; 50 UG/1; UG/1
1 POWDER RESPIRATORY (INHALATION) 2 TIMES DAILY
Qty: 1 INHALER | Refills: 5 | Status: SHIPPED | OUTPATIENT
Start: 2017-05-03

## 2017-05-03 NOTE — PROGRESS NOTES
HISTORY OF PRESENT ILLNESS  Edward Ba is a 80 y.o. female. HPI  Chief Complaint   Patient presents with    Breast pain     Underneath left breast. hurt today.  Sacrum Pain     Follow up. Edward Ba is a 80 y.o. female    Pt presents to office today with her son Katelin Matute) for a follow up on Left breast pain/Sacral pain. Pt's son states that Pt has gotten more weaker and complains of pain and rash under her breast since her last ov. He has been using some desitin ointment. Susievesta Crawford reports he gives her the baclofen in the am and later in the day if she complains of being in pain. Does not seem to be helping her much however. \"I really think she needs to be on narcotics\". States they did get the hospital bed but she hates it due to the quality of the mattress. Has not used her inhalers today (or for the past couple weeks) due to not having the money. States he can pick it up today. She seems more wheezy today. Diann Waters he thinks she needs to be put in a nursing home asap. States she is getting worse and he is very overwhelmed with her care. She is incontinent of bowel and bladder, chronic pain related to compression fractures in her spine and muscle spasms, she has difficulty verbalizing her pain and her needs, is sometimes agitated and combative. She often times refuses her inhalers and medications. Needs complete assistance with ADLs and meals/eating. He has called  and adult protective services for help and states no one will call him back. No one has been to the home. His brother is still the POA and refuses to put her in the nursing home due to financial motivations. Current Outpatient Prescriptions   Medication Sig Dispense Refill    albuterol-ipratropium (DUO-NEB) 2.5 mg-0.5 mg/3 ml nebu 3 mL by Nebulization route every six (6) hours as needed. 270 mL 2    tiotropium (SPIRIVA) 18 mcg inhalation capsule Take 1 Cap by inhalation daily.  30 Cap 5    fluticasone-salmeterol (ADVAIR) 250-50 mcg/dose diskus inhaler Take 1 Puff by inhalation two (2) times a day. 1 Inhaler 5    albuterol (PROAIR HFA) 90 mcg/actuation inhaler TAKE 2 PUFFS BY INHALATION EVERY SIX (6) HOURS AS NEEDED FOR WHEEZING OR SHORTNESS OF BREATH. 8.5 Inhaler 5    nystatin (MYCOSTATIN) topical cream Apply  to affected area two (2) times a day. Apply under breasts 15 g 11    FLUoxetine (PROZAC) 10 mg capsule TAKE 1 CAPSULE EVERY DAY 30 Cap 5    multivitamin (ONE A DAY) tablet TAKE 1 TABLET EVERY DAY 90 Tab 3    zinc oxide 25 % pste Use as directed 2 x day 30 g 2    levothyroxine (SYNTHROID) 50 mcg tablet       omeprazole (PRILOSEC) 20 mg capsule TAKE ONE CAPSULE BY MOUTH ONCE DAILY 30 Cap 1    Nebulizer & Compressor machine 1 Each by Does Not Apply route four (4) times daily as needed (wheezing/ coughing spasms). Machine and supplies need to be delivered to [facility] 1 Each 0    Nebulizer Accessories kit 4 x a day use for copd and wheezing/coughing spasms. Supplies need to be delivered to [facility] 1 Kit 0    inhalational spacing device 1 each by Does Not Apply route as needed (use with combivent). 1 Device 3    baclofen (LIORESAL) 10 mg tablet TAKE 1 TABLET BY MOUTH TWICE DAILY AS NEEDED FOR BACK PAIN/MUSCLE SPASM 60 Tab 1    therapeutic multivitamin (THERAGRAN) tablet Take 1 Tab by mouth daily. 90 Tab 3     Allergies   Allergen Reactions    Pcn [Penicillins] Unknown (comments)    Zoloft [Sertraline] Unknown (comments)     Past Medical History:   Diagnosis Date    Bowel and bladder incontinence 5/3/2017    Compression fracture     COPD (chronic obstructive pulmonary disease) ([facility] 12/3/2015    Depression 7/1/2011    History of anemia 5/3/2017    Rheumatoid arthritis ([facility] 7/1/2011    Unspecified hypothyroidism 7/1/2011     History reviewed. No pertinent surgical history.   Family History   Problem Relation Age of Onset    Heart Disease Mother    [facility] Migraines Father     Lung Disease Son      Social History   Substance Use Topics    Smoking status: Former Smoker    Smokeless tobacco: Never Used    Alcohol use No       Review of Systems   Constitutional: Negative for chills, diaphoresis, fever, malaise/fatigue and weight loss. HENT: Positive for hearing loss (chronic ). Negative for congestion, ear discharge, ear pain, nosebleeds, sore throat and tinnitus. Eyes: Negative for blurred vision, photophobia, pain, discharge and redness. Respiratory: Positive for shortness of breath. Negative for cough, hemoptysis, sputum production, wheezing and stridor. Cardiovascular: Negative for chest pain, palpitations, orthopnea and leg swelling. Gastrointestinal: Negative for abdominal pain, blood in stool, constipation, diarrhea, melena, nausea and vomiting. Bowel incontinence. Genitourinary:        Urinary incontinence. Musculoskeletal: Positive for back pain. Negative for falls, joint pain, myalgias and neck pain. Neurological: Positive for focal weakness and weakness. Negative for dizziness, tingling, tremors, sensory change, speech change, seizures, loss of consciousness and headaches. Psychiatric/Behavioral: Positive for memory loss (chronic ). Mood appears stable    All other systems reviewed and are negative. Physical Exam   Constitutional: She is oriented to person, place, and time. She appears well-developed. No distress. In wheelchair, appears pale. HENT:   Head: Normocephalic and atraumatic. Right Ear: External ear normal. Tympanic membrane is not bulging. No middle ear effusion. Left Ear: External ear normal. Tympanic membrane is not bulging. No middle ear effusion. Nose: Nose normal. Right sinus exhibits no maxillary sinus tenderness and no frontal sinus tenderness. Left sinus exhibits no maxillary sinus tenderness and no frontal sinus tenderness.    Mouth/Throat: Oropharynx is clear and moist. No oropharyngeal exudate. Eyes: Conjunctivae and EOM are normal. Pupils are equal, round, and reactive to light. Right eye exhibits no discharge. Left eye exhibits no discharge. No scleral icterus. Neck: Normal range of motion. Neck supple. No thyromegaly present. Cardiovascular: Normal rate, regular rhythm, normal heart sounds and intact distal pulses. Pulmonary/Chest: Effort normal. No stridor. No respiratory distress. She has wheezes (mild and generazlied. ). She has no rales. She exhibits no tenderness. Breathing not labored. Abdominal: Bowel sounds are normal. She exhibits no distension and no mass. There is no tenderness. There is no rebound and no guarding. Musculoskeletal: She exhibits no tenderness. Lymphadenopathy:     She has no cervical adenopathy. Neurological: She is alert and oriented to person, place, and time. Skin: Skin is warm and dry. Rash (erythematous rash under folds of breast bilaterally. ) noted. She is not diaphoretic. Psychiatric: Her affect is blunt. She is withdrawn. She is noncommunicative. Nursing note and vitals reviewed. ASSESSMENT and PLAN    ICD-10-CM ICD-9-CM    1. Sacral pain M53.3 724.6    2. Chronic obstructive pulmonary disease, unspecified COPD type ([facility] J44.9 496 albuterol-ipratropium (DUO-NEB) 2.5 mg-0.5 mg/3 ml nebu      tiotropium (SPIRIVA) 18 mcg inhalation capsule      fluticasone-salmeterol (ADVAIR) 250-50 mcg/dose diskus inhaler      albuterol (PROAIR HFA) 90 mcg/actuation inhaler   3. History of anemia Z86.2 V12.3 CBC WITH AUTOMATED DIFF      METABOLIC PANEL, BASIC   4. Wheezing R06.2 786.07 XR CHEST PA LAT   5. Congenital hypothyroidism without goiter E03.1 243 TSH 3RD GENERATION   6. Cutaneous candidiasis B37.2 112.3 nystatin (MYCOSTATIN) topical cream   7. Bowel and bladder incontinence R32 788.30     R15.9 787.60      Isaiah was seen today for breast pain and sacrum pain.     Diagnoses and all orders for this visit:  **I continue to have concerns that Kath Reyes is a fit caretaker for this patient as he has extreme mental illness (uncontrolled and untreated), expresses being overwhelmed at every visit, today he was out ranting again in the waiting room in front of other patient who complained. APS has been called x 3 and he reports no one has yet been out to the home. Will have our  page get back on the case and see if there are nursing home options for her. I would recommend nursing home placement asap for this patient. Sacral pain  Her did get the hospital bed but unfortunately it is a very thin, poor mattress with springs and too painful for her to sleep on. We will reach out to DME supply and see if there is an upgraded mattress she can get due to her risk of bed sores and chronic pain. Will continue the prn bid baclofen. Narcotics are contraindated due to her risk of respiratory suppression or overdose. Chronic obstructive pulmonary disease, unspecified COPD type ([facility]  Wheezing  -     XR CHEST PA LAT; Future  She needs to resume her inhalers, sent to pharm. Hopefully he can afford to pick them up today   Deviate plan per xray result. -     albuterol-ipratropium (DUO-NEB) 2.5 mg-0.5 mg/3 ml nebu; 3 mL by Nebulization route every six (6) hours as needed. -     tiotropium (SPIRIVA) 18 mcg inhalation capsule; Take 1 Cap by inhalation daily. -     fluticasone-salmeterol (ADVAIR) 250-50 mcg/dose diskus inhaler; Take 1 Puff by inhalation two (2) times a day. -     albuterol (PROAIR HFA) 90 mcg/actuation inhaler; TAKE 2 PUFFS BY INHALATION EVERY SIX (6) HOURS AS NEEDED FOR WHEEZING OR SHORTNESS OF BREATH. History of anemia  -     CBC WITH AUTOMATED DIFF  -     METABOLIC PANEL, BASIC  Deviate plan per lab results       Congenital hypothyroidism without goiter  -     TSH 3RD GENERATION    Cutaneous candidiasis  -  add   nystatin (MYCOSTATIN) topical cream; Apply  to affected area two (2) times a day.  Apply under breasts  Recheck in 1 month, sooner if needed. Follow-up Disposition:  Return in about 1 month (around 6/3/2017) for recheck on skin and back .

## 2017-05-03 NOTE — Clinical Note
can we call [facility] brought her out the hospital bed and see if there are alternative mattress options

## 2017-05-03 NOTE — MR AVS SNAPSHOT
Visit Information Date & Time Provider Department Dept. Phone Encounter #  
 5/3/2017 10:20 AM Rhonda Ramos, [address] [facility] [phone] [id number] Follow-up Instructions Return in about 1 month (around 6/3/2017) for recheck on skin and back . Your Appointments 6/5/2017  1:40 PM  
ROUTINE CARE with Rhonda Ramos  [facility] ([facility]) Appt Note: three month follow-up  
 [address] [phone]  
[phone]  
  
   
 [address] [address] Upcoming Health Maintenance Date Due  
 GLAUCOMA SCREENING Q2Y 9/18/1994 OSTEOPOROSIS SCREENING (DEXA) 9/18/1994 MEDICARE YEARLY EXAM 6/11/2017 INFLUENZA AGE 9 TO ADULT 8/1/2017 DTaP/Tdap/Td series (2 - Td) 3/3/2027 Allergies as of 5/3/2017  Review Complete On: 5/3/2017 By: Rhonda Ramos NP Severity Noted Reaction Type Reactions Pcn [Penicillins]  07/01/2011    Unknown (comments) Zoloft [Sertraline]  07/01/2011    Unknown (comments) Current Immunizations  Reviewed on 12/3/2015 Name Date Influenza High Dose Vaccine PF 12/3/2015 Pneumococcal Conjugate (PCV-13) 12/3/2015 Pneumococcal Vaccine (Unspecified Type) 7/1/2000 Not reviewed this visit You Were Diagnosed With   
  
 Codes Comments Sacral pain    -  Primary ICD-10-CM: M53.3 ICD-9-CM: 724.6 Chronic obstructive pulmonary disease, unspecified COPD type ([facility]     ICD-10-CM: J44.9 ICD-9-CM: 474 History of anemia     ICD-10-CM: Z86.2 ICD-9-CM: V12.3 Wheezing     ICD-10-CM: R06.2 ICD-9-CM: 786.07 Congenital hypothyroidism without goiter     ICD-10-CM: E03.1 ICD-9-CM: 081 Vitals BP Pulse Temp Resp Height(growth percentile) Weight(growth percentile) 112/70 93 98.3 °F (36.8 °C) (Oral) 14 5' 1\" (1.549 m) 145 lb (65.8 kg) SpO2 BMI OB Status Smoking Status 95% 27.4 kg/m2 Postmenopausal Former Smoker Vitals History BMI and BSA Data Body Mass Index Body Surface Area  
 27.4 kg/m 2 1.68 m 2 Preferred Pharmacy Pharmacy Name Phone [id number], [address] [place] [address] [phone] Your Updated Medication List  
  
   
This list is accurate as of: 5/3/17 11:16 AM.  Always use your most recent med list.  
  
  
  
  
 albuterol 90 mcg/actuation inhaler Commonly known as:  PROAIR HFA  
TAKE 2 PUFFS BY INHALATION EVERY SIX (6) HOURS AS NEEDED FOR WHEEZING OR SHORTNESS OF BREATH.  
  
 albuterol-ipratropium 2.5 mg-0.5 mg/3 ml Nebu Commonly known as:  DUO-NEB  
3 mL by Nebulization route every six (6) hours as needed. baclofen 10 mg tablet Commonly known as:  LIORESAL Take 1 Tab by mouth two (2) times a day. As needed for back pain/spasms FLUoxetine 10 mg capsule Commonly known as:  PROzac TAKE 1 CAPSULE EVERY DAY  
  
 fluticasone-salmeterol 250-50 mcg/dose diskus inhaler Commonly known as:  ADVAIR Take 1 Puff by inhalation two (2) times a day. inhalational spacing device 1 each by Does Not Apply route as needed (use with combivent). levothyroxine 50 mcg tablet Commonly known as:  SYNTHROID  
  
 multivitamin tablet Commonly known as:  ONE A DAY TAKE 1 TABLET EVERY DAY Nebulizer & Compressor machine 1 Each by Does Not Apply route four (4) times daily as needed (wheezing/ coughing spasms). Machine and supplies need to be delivered to [facility] Nebulizer Accessories Kit 4 x a day use for copd and wheezing/coughing spasms. Supplies need to be delivered to [facility]  
  
 nystatin topical cream  
Commonly known as:  MYCOSTATIN Apply  to affected area two (2) times a day. Apply under breasts  
  
 omeprazole 20 mg capsule Commonly known as:  PRILOSEC  
TAKE ONE CAPSULE BY MOUTH ONCE DAILY therapeutic multivitamin tablet Commonly known as:  [facility] Take 1 Tab by mouth daily. tiotropium 18 mcg inhalation capsule Commonly known as:  Peggy Mcintosh Take 1 Cap by inhalation daily. zinc oxide 25 % Pste Use as directed 2 x day Prescriptions Sent to Pharmacy Refills  
 albuterol-ipratropium (DUO-NEB) 2.5 mg-0.5 mg/3 ml nebu 2 Sig: 3 mL by Nebulization route every six (6) hours as needed. Class: Normal  
 Pharmacy: [facility] Ph #: [phone] Route: Nebulization  
 tiotropium (SPIRIVA) 18 mcg inhalation capsule 5 Sig: Take 1 Cap by inhalation daily. Class: Normal  
 Pharmacy: [facility] Ph #: [phone] Route: Inhalation  
 fluticasone-salmeterol (ADVAIR) 250-50 mcg/dose diskus inhaler 5 Sig: Take 1 Puff by inhalation two (2) times a day. Class: Normal  
 Pharmacy: [facility] Ph #: [phone] Route: Inhalation  
 albuterol (PROAIR HFA) 90 mcg/actuation inhaler 5 Sig: TAKE 2 PUFFS BY INHALATION EVERY SIX (6) HOURS AS NEEDED FOR WHEEZING OR SHORTNESS OF BREATH. Class: Normal  
 Pharmacy: [id number] [place], [facility] [place] Ph #: [phone]  
 nystatin (MYCOSTATIN) topical cream 11 Sig: Apply  to affected area two (2) times a day. Apply under breasts Class: Normal  
 Pharmacy: [facility] Ph #: [phone] Route: Topical  
  
We Performed the Following CBC WITH AUTOMATED DIFF [79564 CPT(R)] METABOLIC PANEL, BASIC [06642 CPT(R)] TSH 3RD GENERATION [63036 CPT(R)] Follow-up Instructions Return in about 1 month (around 6/3/2017) for recheck on skin and back . To-Do List   
 07/05/2017 Imaging:  XR CHEST PA LAT Introducing \[facility] [place] [facility]\"" & HEALTH SERVICES! Dear Pdero Duncan: 
Thank you for requesting a [facility] account. Our records indicate that you already have an active [facility] account. You can access your account anytime at [email or web address] Did you know that you can access your hospital and ER discharge instructions at any time in [facility]? You can also review all of your test results from your hospital stay or ER visit. Additional Information If you have questions, please visit the Frequently Asked Questions section of the [facility] website at [email or web address] Remember, [facility] is NOT to be used for urgent needs. For medical emergencies, dial 911. Now available from your iPhone and Android! Please provide this summary of care documentation to your next provider. Your primary care clinician is listed as Delgado Mackey. If you have any questions after today's visit, please call [phone].

## 2017-05-03 NOTE — Clinical Note
Can you reach out to Josee Biggs again?  He states he needs to figure something out with getting her placed in a nursing home-see my n ote (same issues)

## 2017-05-03 NOTE — PROGRESS NOTES
Pt presents to office today with her son Raquel Ruiz) for a follow up on Left breast pain/Sacral pain. Pt's son states that Pt has gotten more weaker and complains of pain under her breast since her last ov. Chief Complaint   Patient presents with    Breast pain     Underneath left breast. hurt today.  Sacrum Pain     Follow up. 1. Have you been to the ER, urgent care clinic since your last visit? Hospitalized since your last visit? No    2. Have you seen or consulted any other health care providers outside of the [address] [place] [address] since your last visit? Include any pap smears or colon screening.  No

## 2017-05-04 LAB
BASOPHILS # BLD AUTO: 0.1 X10E3/UL (ref 0–0.2)
BASOPHILS NFR BLD AUTO: 0 %
BUN SERPL-MCNC: 10 MG/DL (ref 8–27)
BUN/CREAT SERPL: 16 (ref 12–28)
CALCIUM SERPL-MCNC: 9 MG/DL (ref 8.7–10.3)
CHLORIDE SERPL-SCNC: 100 MMOL/L (ref 96–106)
CO2 SERPL-SCNC: 21 MMOL/L (ref 18–29)
CREAT SERPL-MCNC: 0.63 MG/DL (ref 0.57–1)
EOSINOPHIL # BLD AUTO: 0.3 X10E3/UL (ref 0–0.4)
EOSINOPHIL NFR BLD AUTO: 2 %
ERYTHROCYTE [DISTWIDTH] IN BLOOD BY AUTOMATED COUNT: 17.4 % (ref 12.3–15.4)
GLUCOSE SERPL-MCNC: 130 MG/DL (ref 65–99)
HCT VFR BLD AUTO: 38.2 % (ref 34–46.6)
HGB BLD-MCNC: 11.9 G/DL (ref 11.1–15.9)
IMM GRANULOCYTES # BLD: 0 X10E3/UL (ref 0–0.1)
IMM GRANULOCYTES NFR BLD: 0 %
LYMPHOCYTES # BLD AUTO: 3.7 X10E3/UL (ref 0.7–3.1)
LYMPHOCYTES NFR BLD AUTO: 28 %
MCH RBC QN AUTO: 26.4 PG (ref 26.6–33)
MCHC RBC AUTO-ENTMCNC: 31.2 G/DL (ref 31.5–35.7)
MCV RBC AUTO: 85 FL (ref 79–97)
MONOCYTES # BLD AUTO: 0.8 X10E3/UL (ref 0.1–0.9)
MONOCYTES NFR BLD AUTO: 6 %
NEUTROPHILS # BLD AUTO: 8.2 X10E3/UL (ref 1.4–7)
NEUTROPHILS NFR BLD AUTO: 64 %
PLATELET # BLD AUTO: 312 X10E3/UL (ref 150–379)
POTASSIUM SERPL-SCNC: 4.6 MMOL/L (ref 3.5–5.2)
RBC # BLD AUTO: 4.51 X10E6/UL (ref 3.77–5.28)
SODIUM SERPL-SCNC: 140 MMOL/L (ref 134–144)
TSH SERPL DL<=0.005 MIU/L-ACNC: 12.89 UIU/ML (ref 0.45–4.5)
WBC # BLD AUTO: 13.1 X10E3/UL (ref 3.4–10.8)

## 2017-05-05 ENCOUNTER — NURSE NAVIGATOR (OUTPATIENT)
Dept: PALLATIVE CARE | Age: 82
End: 2017-05-05

## 2017-05-05 DIAGNOSIS — J44.9 CHRONIC OBSTRUCTIVE PULMONARY DISEASE, UNSPECIFIED COPD TYPE (HCC): Primary | ICD-10-CM

## 2017-05-05 DIAGNOSIS — R15.9 BOWEL AND BLADDER INCONTINENCE: ICD-10-CM

## 2017-05-05 DIAGNOSIS — M06.9 RHEUMATOID ARTHRITIS, INVOLVING UNSPECIFIED SITE, UNSPECIFIED RHEUMATOID FACTOR PRESENCE: ICD-10-CM

## 2017-05-05 DIAGNOSIS — F03.90 DEMENTIA WITHOUT BEHAVIORAL DISTURBANCE, UNSPECIFIED DEMENTIA TYPE: ICD-10-CM

## 2017-05-05 DIAGNOSIS — R32 BOWEL AND BLADDER INCONTINENCE: ICD-10-CM

## 2017-05-05 NOTE — PROGRESS NOTES
Notify shira I really do need to see her next week for fu due to her wbc are high and due to how she was feeling at her ov. Would like to repeat the blood work and cxr. Also her thyroid level is too high which means she is not taking her medication.  Please confirm she is taking her thyroid medication daily

## 2017-05-05 NOTE — PROGRESS NOTES
[facility] Care [facility]ive [facility]   (previously: At [facility])  [phone]  [address] [place] [address], [address] [facility], [facility]    We received a HBPC & SS referral on [place] on  5/5/17 from Erasmo Nash NP. We appreciate this referral.     The patient's case has been reviewed and _x__  Meets / ____ Does not Meet  the following criteria for a initial provider visit:    _x__  Patient's residence is within 20 miles of [address] [place] [address] address listed above  _x_    Patient is non-ambulatory (bedbound or wheel chair bound without ability to self-propel)  _x__ Patient's residence is determined to be a safe environment for the health care team  _x__ Patient has chronic care management health care needs    This patient also ___ Meets / _x__ Does not Meet a priority referral for:    ___ Home Health integration  ___ Post discharge follow up  ___ High risk health care needs    This patient's referring provider has been notified of above. PATIENT [id number] [address]  [address] [place] [address],Second Floor, [facility], [address] [place] [address]  [phone]  9/18/1929     PRIMARY CARE PROVIDER:  Name: Erasmo Nash NP  Phone Number: [phone]  Address: [address] [place] [address] # [phone], [facility] INFORMATION:  Name: Erasmo Nash NP  Phone Number:  [phone]   Address:  [address] [place] [address] [address], [address] [place]:  Pt is homebound. Provider says it has become a struggle to get her to appointments. Pt has bowel/bladder incontinence. PRIMARY CARETAKER:  Name: Bruce Melara  Phone Number: [phone]  Address:  [address] [place] [address],[facility], [facility], [address] [place] [address]  Relationship to the Patient: son     DO Tom? Not currently. Pt was d/c'ed from [address] [place] [address] PT 2/21/17    Nurse called to introduce HBPC services. No answer, left message.     Rachid Childers RN

## 2017-05-09 ENCOUNTER — TELEPHONE (OUTPATIENT)
Dept: GERIATRIC MEDICINE | Age: 82
End: 2017-05-09

## 2017-05-09 NOTE — TELEPHONE ENCOUNTER
Received new patient referral from Dr. Iris Velazco. Spoke w/ son Fernandez Cabrera who was appreciative of call and receptive to our services. He's been caring for his mother alone for 7 years and is experiencing some caregiver burnout. Another nurse was in the home at time of call performing a Wellness visit. He could speak long but did mention a few things. He's a little difficult to follow because he talks fast.     He states they've recently got back from the hospital. He was met by [facility] when patient go back from hospital out of concern for patient's well-being. He believes the  or someone from the hospital called because he was overwhelmed with the current situation and needed a well check. He's glad the police did come to see about him and his mother. He states he's having difficulty getting her out of the house and our service would be beneficial.    Appointment scheduled Monday May 22, 2017 at 10:00am.   Requested medication be ready for review. Will complete paper work and secure pets if any for visit. Verbalized understanding.

## 2017-05-11 ENCOUNTER — APPOINTMENT (OUTPATIENT)
Dept: CT IMAGING | Age: 82
End: 2017-05-11
Attending: STUDENT IN AN ORGANIZED HEALTH CARE EDUCATION/TRAINING PROGRAM
Payer: MEDICARE

## 2017-05-11 ENCOUNTER — HOSPITAL ENCOUNTER (EMERGENCY)
Age: 82
Discharge: HOME | End: 2017-05-11
Attending: STUDENT IN AN ORGANIZED HEALTH CARE EDUCATION/TRAINING PROGRAM
Payer: MEDICARE

## 2017-05-11 ENCOUNTER — TELEPHONE (OUTPATIENT)
Dept: PALLATIVE CARE | Age: 82
End: 2017-05-11

## 2017-05-11 VITALS
RESPIRATION RATE: 16 BRPM | WEIGHT: 145 LBS | OXYGEN SATURATION: 97 % | SYSTOLIC BLOOD PRESSURE: 130 MMHG | HEART RATE: 79 BPM | HEIGHT: 61 IN | DIASTOLIC BLOOD PRESSURE: 85 MMHG | BODY MASS INDEX: 27.38 KG/M2 | TEMPERATURE: 98.4 F

## 2017-05-11 DIAGNOSIS — R53.1 WEAKNESS: Primary | ICD-10-CM

## 2017-05-11 LAB
APPEARANCE UR: CLEAR
BACTERIA URNS QL MICRO: ABNORMAL /HPF
BILIRUB UR QL CFM: NEGATIVE
CAOX CRY URNS QL MICRO: ABNORMAL
COLOR UR: ABNORMAL
EPITH CASTS URNS QL MICRO: ABNORMAL /LPF
GLUCOSE UR STRIP.AUTO-MCNC: NEGATIVE MG/DL
HGB UR QL STRIP: NEGATIVE
HYALINE CASTS URNS QL MICRO: ABNORMAL /LPF (ref 0–5)
KETONES UR QL STRIP.AUTO: ABNORMAL MG/DL
LEUKOCYTE ESTERASE UR QL STRIP.AUTO: NEGATIVE
MUCOUS THREADS URNS QL MICRO: ABNORMAL /LPF
NITRITE UR QL STRIP.AUTO: NEGATIVE
PH UR STRIP: 6 [PH] (ref 5–8)
PROT UR STRIP-MCNC: 30 MG/DL
RBC #/AREA URNS HPF: ABNORMAL /HPF (ref 0–5)
SP GR UR REFRACTOMETRY: 1.03 (ref 1–1.03)
UROBILINOGEN UR QL STRIP.AUTO: 1 EU/DL (ref 0.2–1)
WBC URNS QL MICRO: ABNORMAL /HPF (ref 0–4)

## 2017-05-11 PROCEDURE — 99285 EMERGENCY DEPT VISIT HI MDM: CPT

## 2017-05-11 PROCEDURE — 51701 INSERT BLADDER CATHETER: CPT

## 2017-05-11 PROCEDURE — 81001 URINALYSIS AUTO W/SCOPE: CPT | Performed by: STUDENT IN AN ORGANIZED HEALTH CARE EDUCATION/TRAINING PROGRAM

## 2017-05-11 PROCEDURE — 77030005563 HC CATH URETH INT MMGH -A

## 2017-05-11 NOTE — ED TRIAGE NOTES
Pt resides at home with family member, family called md because they can not take care of pt anymore, pt denies any complaints

## 2017-05-11 NOTE — ED NOTES
[facility] present in the department, pt transferred to EMS stretcher without difficulty, and exit department in no acute distress. Spoke with Jennifer Vasquez (son), he is at home ready to accept pt.
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
## 2017-05-11 NOTE — PROGRESS NOTES
SSED/CM consult received and appreciated. EMR reviewed. History significant for RA, depression, hypothyroidism and COPD. Patient presents to the ED for evaluation. Calls placed to Palliative Medicine -return call received from Nurse Navigator Loly Cruz and updates provided. Informed of call  attempted to [address] regarding need to contact [facility] . Home based care is scheduled to visit patient on 5/22/17 at 1000.     1601 Noted patient's case was opened followed by [address] , -[phone] (active case since 2009/ documented in 12/2016 record) -  this writer placed call to agency.  out of office and office closed at 1600.     1610 Call placed to [facility] - left. Alternative call placed to [facility] -  left 1652. Updates provided to Dr.Dustin Sachin Viramontes is caregiver and has expressed being overwhelmed w/ care giving no additional support noted in the home, case is followed by [facility], and home based care home visit scheduled for 5/22/17. CM placed electronic referral to [facility] via [employer].  time requested for [phone]. Call received from Ara Huff informed of discharge and patient will be returning back home. Son verbalized \" so she was faking it, she was making it up? I will need to make her bed up and get ready for return back home. \" Home is one story and has 3 steps to enter.  asked if Faby Gonzalez would contact Megan Montiel and ask to contact CM - \" I just spoke w/ him a few minutes ago - he will be happy she's coming home. She get ornery with me. Has she acted out with you? \"  CM informed son patient had not shown behaviors when assessing. This writer asked Faby Gonzalez to reach his brother and request call back to  to ask a few questions before discharge at [phone]. Huan verbalized agreement. Call received back from Faby Gonzalez states  \" I spoke w/ brother who works 10 hour days and could get fired for talking on phone - will call you tomorrow. \" Acie Pulse not having additional help at home and could use help twice /week. Augusto Jacobsen say she makes too much money. Renetta Streeter is sending a nurse tomorrow is that the 22nd? \" CM informed appointment is for Monday 5/22/17. Informed this writer will attempt to reach Nakita Jerez on Friday. No additional transitional care needs verbalized. Care Management Interventions  PCP Verified by CM:  Yes  Last Visit to PCP: 05/03/17  Mode of Transport at Discharge: [address] [facility]  [address] Time of Discharge: [address] (CM Consult): Discharge Planning  MyChart Signup: Yes  Discharge Durable Medical Equipment: No  Physical Therapy Consult: No  Occupational Therapy Consult: No  Speech Therapy Consult: No  Current Support Network: Own Home, Lives with Caregiver  Confirm Follow Up Transport: Other (see comment) (ambulance)  Plan discussed with Pt/Family/Caregiver: Yes  Discharge Location  Discharge Placement:  (TBD)

## 2017-05-11 NOTE — DISCHARGE INSTRUCTIONS
We hope that we have addressed all of your medical concerns. The examination and treatment you received in the Emergency Department were for an emergent problem and were not intended as complete care. It is important that you follow up with your healthcare provider(s) for ongoing care. If your symptoms worsen or do not improve as expected, and you are unable to reach your usual health care provider(s), you should return to the Emergency Department. Today's healthcare is undergoing tremendous change, and patient satisfaction surveys are one of the many tools to assess the quality of medical care. You may receive a survey from the [employer] regarding your experience in the Emergency Department. I hope that your experience has been completely positive, particularly the medical care that I provided. As such, please participate in the survey; anything less than excellent does not meet my expectations or intentions. [facility] and 8 [facility] participate in nationally recognized quality of care measures. If your blood pressure is greater than 120/80, as reported below, we urge that you seek medical care to address the potential of high blood pressure, commonly known as hypertension. Hypertension can be hereditary or can be caused by certain medical conditions, pain, stress, or \"white coat syndrome. \"       Please make an appointment with your health care provider(s) for follow up of your Emergency Department visit. VITALS:   Patient Vitals for the past 8 hrs:   Temp Pulse Resp BP SpO2   05/11/17 1600 - - - 125/71 97 %   05/11/17 1500 - - - 117/58 94 %   05/11/17 1345 98.9 °F (37.2 °C) 77 16 118/61 96 %          Thank you for allowing us to provide you with medical care today. We realize that you have many choices for your emergency care needs. Please choose us in the future for any continued health care needs.       Regards, Milagros Joya, [address]   Office: [phone]            Recent Results (from the past 24 hour(s))   URINALYSIS W/MICROSCOPIC    Collection Time: 05/11/17  1:59 PM   Result Value Ref Range    Color DARK YELLOW      Appearance CLEAR CLEAR      Specific gravity 1.030 1.003 - 1.030      pH (UA) 6.0 5.0 - 8.0      Protein 30 (A) NEG mg/dL    Glucose NEGATIVE  NEG mg/dL    Ketone TRACE (A) NEG mg/dL    Blood NEGATIVE  NEG      Urobilinogen 1.0 0.2 - 1.0 EU/dL    Nitrites NEGATIVE  NEG      Leukocyte Esterase NEGATIVE  NEG      WBC 0-4 0 - 4 /hpf    RBC 0-5 0 - 5 /hpf    Epithelial cells FEW FEW /lpf    Bacteria 1+ (A) NEG /hpf    Mucus 4+ (A) NEG /lpf    CA Oxalate crystals FEW (A) NEG      Hyaline cast 0-2 0 - 5 /lpf   BILIRUBIN, CONFIRM    Collection Time: 05/11/17  1:59 PM   Result Value Ref Range    Bilirubin UA, confirm NEGATIVE  NEG         No results found. Weakness: Care Instructions  Your Care Instructions  Weakness is a lack of physical or muscle strength. You may feel that you need to make extra effort to move your arms, legs, or other muscles. Generalized weakness means that you feel weak in most areas of your body. Another type of weakness may affect just one muscle or group of muscles. You may feel weak and tired after you have done too much activity, such as taking an extra-long hike. This is not a serious problem. It often goes away on its own. Feeling weak can also be caused by medical conditions like thyroid problems, depression, or a virus. Sometimes the cause can be serious. Your doctor may want to do more tests to try to find the cause of the weakness. The doctor has checked you carefully, but problems can develop later. If you notice any problems or new symptoms, get medical treatment right away. Follow-up care is a key part of your treatment and safety.  Be sure to make and go to all appointments, and call your doctor if you are having problems. It's also a good idea to know your test results and keep a list of the medicines you take. How can you care for yourself at home? · Rest when you feel tired. · Be safe with medicines. If your doctor prescribed medicine, take it exactly as prescribed. Call your doctor if you think you are having a problem with your medicine. You will get more details on the specific medicines your doctor prescribes. · Do not skip meals. Eating a balanced diet may increase your energy level. · Get some physical activity every day, but do not get too tired. When should you call for help? Call your doctor now or seek immediate medical care if:  · You have new or worse weakness. · You are dizzy or lightheaded, or you feel like you may faint. Watch closely for changes in your health, and be sure to contact your doctor if:  · You do not get better as expected. Where can you learn more? Go to http://sly-ariane.info/. Enter 033 [id number] [id number] in the search box to learn more about \"Weakness: Care Instructions. \"  Current as of: May 27, 2016  Content Version: 11.2  © 9866-7183 [employer]. Care instructions adapted under license by [employer] (which disclaims liability or warranty for this information). If you have questions about a medical condition or this instruction, always ask your healthcare professional. [address] any warranty or liability for your use of this information.

## 2017-05-11 NOTE — TELEPHONE ENCOUNTER
[address] [place] [address] has a home visit scheduled for Monday May 22. Pt's insurance [employer] (MEDICARE REPLACEMENT/ADVANTAGE - HMO) requires a pre authorization. In order to approve a Home Based Primary Care visit, [employer] rep says that pt or POA needs to call [facility] [employer] service # [phone] and request to change PCP from Yaquelin Grace NP to Dr. Edith Dial ([address] [place] [address] provider). Nurse called [facility] to inform him of above. No answer, left message.

## 2017-05-11 NOTE — PROGRESS NOTES
Call placed to [address] [place] [address] [phone]  - Report provided to [facility], APS/Intake.

## 2017-05-12 ENCOUNTER — PATIENT OUTREACH (OUTPATIENT)
Dept: FAMILY MEDICINE CLINIC | Age: 82
End: 2017-05-12
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
## 2017-05-18 ENCOUNTER — PATIENT OUTREACH (OUTPATIENT)
Dept: FAMILY MEDICINE CLINIC | Age: 82
End: 2017-05-18

## 2017-05-18 ENCOUNTER — TELEPHONE (OUTPATIENT)
Dept: PALLATIVE CARE | Age: 82
End: 2017-05-18

## 2017-05-18 NOTE — PROGRESS NOTES
NN CCM note:    Outbound call to patient today to follow up on patient's condition. NN was able to speak with son Patricia Barry). Verified  and address with patient's son, Junior Issa (listed on HIPPA) as identifiers. Provided introduction to self, and explanation of the NN role. Son states,\" Mom is doing okay today, I was able to get her to eat. The nurses are coming to see her on Monday. NN asked if the patient was still having problems with diarrhea and son said it was better. NN asked about medications and was able to review medications being taken with son. Patient was seen by PCP on 17. NN reviewed the following follow-up appointment with son:  Future Appointments  Date Time Provider Jennifer Villavicencio   2017 10:00 AM MD [address]   2017 1:40 PM John Rodgers  [address]     NN contact information given to son and he verbalized an understanding of how to contact [facility] PCP or this writer if he needed to. NN educated caretaker  (son) regarding red flags to watch for when feeding patient/caring for his mom, communities resources available, and how and when to call for help. Patient is an established patient at [facility]. NN sent referral to [facility] prior to Ed visit, who replied that this patient is follow by , APS, and  patient is followed by . SonLaci is POA. Case management Plan:  NN will continue to attempt contacts with patient by telephone or during office visit within next 30 days. Will continue to follow as necessary for the remaining NAFISA window. NN will reassess for case management needs prior to discharge from [facility] on or about 17.

## 2017-05-18 NOTE — PATIENT INSTRUCTIONS
Learning About Activities of Daily Living  What are activities of daily living? Activities of daily living (ADLs) are the basic self-care tasks you do every day. As you age, and if you have health problems, you may find that it's harder to do these things for yourself. That's when you may need some help. Your doctor uses ADLs to measure how much help you need. Knowing what you can and can't do for yourself is an important first step to getting help. And when you have the help you need, you can stay as independent as possible. Your doctor will want to know if you are able to do tasks such as:  · Take a bath or shower without help. · Go to the bathroom by yourself. · Dress and undress without help. · Shave, comb your hair, and brush teeth on your own. · Get in and out of bed or a chair without help. · Feed yourself without help. If you are having trouble doing basic self-care tasks, talk with your doctor. You may want to bring a caregiver or family member who can help the doctor understand your needs and abilities. How will a doctor assess your ADLs? Asking about ADLs is part of a routine health checkup your doctor will likely do as you age. Your health check might be done in a doctor's office, in your home, or at a hospital. The goal is to find out if you are having any problems that could make your health problems worse or that make it unsafe for you to be on your own. To measure your ADLs, your doctor will ask how hard it is for you to do routine tasks. He or she may also want to know if you have changed the way you do a task because of a health problem. He or she may watch how you:  · Walk back and forth. · Keep your balance while you stand or walk. · Move from sitting to standing or from a bed to a chair. · Button or unbutton a shirt or sweater. · Remove and put on your shoes. It's normal to feel a little worried or anxious if you find you can't do all the things you used to be able to do. Talking with your doctor about ADLs isn't a test that you either pass or fail. It's just a way to get more information about your health and safety. Follow-up care is a key part of your treatment and safety. Be sure to make and go to all appointments, and call your doctor if you are having problems. It's also a good idea to know your test results and keep a list of the medicines you take. Where can you learn more? Go to http://syl-ariane.info/. Enter I523 in the search box to learn more about \"Learning About Activities of Daily Living. \"  Current as of: May 23, 2016  Content Version: 11.2  © 1870-4247 [employer]. Care instructions adapted under license by [employer] (which disclaims liability or warranty for this information). If you have questions about a medical condition or this instruction, always ask your healthcare professional. [address] any warranty or liability for your use of this information.

## 2017-05-18 NOTE — TELEPHONE ENCOUNTER
Call # 2 (first call, KALE on 5/11/17) to [address]  requesting he call [phone] and ask to change his mother's PCP from Anita Christina NP to Dr. Corbin Carrero ([facility] provider). Pt's insurance [employer] - G[employer] (MEDICARE REPLACEMENT/ADVANTAGE - HMO) requires a pre authorization for Home Based Primary Care visits and the PCP changed must be initiated by pt or MPOA before pre-authorization process can begin. No answer, left message.

## 2017-05-19 ENCOUNTER — NURSE NAVIGATOR (OUTPATIENT)
Dept: PALLATIVE CARE | Age: 82
End: 2017-05-19

## 2017-05-19 NOTE — PROGRESS NOTES
[facility] Based Primary Care  Nursing Note  [phone]  Fax [phone]     Name:  Divya Mejia  YOB: 1929     Pt's son and Gary Love called the office saying he tried to call [employer] a few minutes ago to change pt's PCP from Delgado Mackey to Dr. Trip Burgess so [facility] referral can be initiated. He was told by [facility]  that they do not have a Dr. Danielle Waggoner in their records. Mr. Gael Shirley expressed his frustration. He says he is unable to make any personal calls during his workday so he has been unable to call [employer] until today. He got off work at 12 noon today and is trying to take care of this so his mother can receive a home visit. Nurse called [employer] to try to clarify Dr. Tracy Mcgowan information. [phone].  Selene Rosio says they do not have a Dr. Trip Burgess in their network, so therefore pt's PCP cannot be changed to Dr. Danielle Waggoner. Provider notified and Gary Love notified.     Geovanni Wilson, ARTURN, RN  Clinical Referral Navigator

## 2017-05-19 NOTE — PROGRESS NOTES
I wonder if the person he spoke to got the spelling of her name right, is there any way we can call human to verify this?

## 2017-05-22 ENCOUNTER — TELEPHONE (OUTPATIENT)
Dept: FAMILY MEDICINE CLINIC | Age: 82
End: 2017-05-22

## 2017-05-22 NOTE — TELEPHONE ENCOUNTER
Male family member (I think was son?) called at 6 last night, very upset that she continues to have diarrhea. Apparently has been going on for 3 weeks and not particularly worse now. Sounded like he was drunk, as he had difficulty staying on topic in the conversation and wasn't making a lot of sense. Also was using a very large number of expletives and complaining about having to clean her up and how that would make him vomit and complaining that her family hadn't put her in the nursing home years ago. Hard to reason with him. He asked at one point if he could try imodium but could not tell me if she had blood in stool or a fever and was so tangential we could not really have a discussion. I see from  note 5/11 that [facility] is already involved. Will check with her to see if I need to contact them again, as I am concerned about patient's safety.

## 2017-05-24 ENCOUNTER — TELEPHONE (OUTPATIENT)
Dept: FAMILY MEDICINE CLINIC | Age: 82
End: 2017-05-24

## 2017-05-25 ENCOUNTER — TELEPHONE (OUTPATIENT)
Dept: FAMILY MEDICINE CLINIC | Age: 82
End: 2017-05-25

## 2017-05-25 NOTE — TELEPHONE ENCOUNTER
Spoke with Pt's son(Huan) for 8 minutes concerning message below. He talked about how Pt had been taking her oxygen off from time to time and also how she has been sleeping longer now. I advised Nathanael Preciado that with Pt's Dementia, she will be doing that sometimes and will have to monitor her O2 status often to make sure, she is getting adequate oxygen. As of when we spoke , he reports that Pt had eaten Eggs and toast for breakfast and was doing fine.

## 2017-05-25 NOTE — TELEPHONE ENCOUNTER
Patient's son Joy Romano is calling stating that he needs to speak with the nurse asap because for the last day and a half, the patient has not been eating, refusing to take her medication and sleeping all the time. He says that she is also taking her oxygen off constantly and he is worried that she will pass away before she is placed in a nursing home because she's not eating. He says that he was told all of this is normal for dementia patients and needs advice about what to do. His number is [phone].

## 2017-05-25 NOTE — TELEPHONE ENCOUNTER
Call to on call provider: Patient's son reports patient keeps removing her nasal cannula. Patient is currently on 3 LPM. O2 SATS are generally 99% with oxygen but will go below 90% without oxygen. Son is concerned about keeping oxygen levels at appropriate levels. Recommended keeping oxygen levels above 94%. Contact palliative medicine for inability to sustain elevated oxygen levels.

## 2017-05-31 ENCOUNTER — TELEPHONE (OUTPATIENT)
Dept: PALLATIVE CARE | Age: 82
End: 2017-05-31

## 2017-05-31 ENCOUNTER — TELEPHONE (OUTPATIENT)
Dept: GERIATRIC MEDICINE | Age: 82
End: 2017-05-31

## 2017-05-31 NOTE — TELEPHONE ENCOUNTER
After speaking to son Mike Jones, found out this is a [facility] patient. Call sent to them.

## 2017-05-31 NOTE — TELEPHONE ENCOUNTER
Patients son called he said he has been waiting to hear back about home health for his mom he said he has been waiting 3 weeks for an update please call [phone]

## 2017-06-06 ENCOUNTER — APPOINTMENT (OUTPATIENT)
Dept: CT IMAGING | Age: 82
DRG: 515 | End: 2017-06-06
Attending: EMERGENCY MEDICINE
Payer: MEDICARE

## 2017-06-06 ENCOUNTER — HOSPITAL ENCOUNTER (INPATIENT)
Age: 82
LOS: 5 days | Discharge: NURSING, REHAB OR LONG-TERM CARE | DRG: 515 | End: 2017-06-16
Attending: EMERGENCY MEDICINE | Admitting: HOSPITALIST
Payer: MEDICARE

## 2017-06-06 ENCOUNTER — APPOINTMENT (OUTPATIENT)
Dept: GENERAL RADIOLOGY | Age: 82
DRG: 515 | End: 2017-06-06
Attending: EMERGENCY MEDICINE
Payer: MEDICARE

## 2017-06-06 DIAGNOSIS — F03.90 DEMENTIA WITHOUT BEHAVIORAL DISTURBANCE, UNSPECIFIED DEMENTIA TYPE: ICD-10-CM

## 2017-06-06 DIAGNOSIS — G93.41 METABOLIC ENCEPHALOPATHY: ICD-10-CM

## 2017-06-06 DIAGNOSIS — R62.7 FAILURE TO THRIVE IN ADULT: Primary | ICD-10-CM

## 2017-06-06 DIAGNOSIS — M54.50 ACUTE BILATERAL LOW BACK PAIN WITHOUT SCIATICA: ICD-10-CM

## 2017-06-06 DIAGNOSIS — R53.81 DEBILITY: ICD-10-CM

## 2017-06-06 PROBLEM — E86.0 DEHYDRATION: Status: ACTIVE | Noted: 2017-06-06

## 2017-06-06 PROBLEM — M62.82 RHABDOMYOLYSIS: Status: ACTIVE | Noted: 2017-06-06

## 2017-06-06 PROBLEM — S22.000A COMPRESSION FRACTURE OF BODY OF THORACIC VERTEBRA (HCC): Status: ACTIVE | Noted: 2017-06-06

## 2017-06-06 LAB
ALBUMIN SERPL BCP-MCNC: 3.1 G/DL (ref 3.5–5)
ALBUMIN/GLOB SERPL: 0.6 {RATIO} (ref 1.1–2.2)
ALP SERPL-CCNC: 77 U/L (ref 45–117)
ALT SERPL-CCNC: 17 U/L (ref 12–78)
ANION GAP BLD CALC-SCNC: 6 MMOL/L (ref 5–15)
APPEARANCE UR: ABNORMAL
AST SERPL W P-5'-P-CCNC: 22 U/L (ref 15–37)
BACTERIA URNS QL MICRO: NEGATIVE /HPF
BASOPHILS # BLD AUTO: 0 K/UL (ref 0–0.1)
BASOPHILS # BLD: 0 % (ref 0–1)
BILIRUB SERPL-MCNC: 0.7 MG/DL (ref 0.2–1)
BILIRUB UR QL CFM: POSITIVE
BUN SERPL-MCNC: 10 MG/DL (ref 6–20)
BUN/CREAT SERPL: 13 (ref 12–20)
CALCIUM SERPL-MCNC: 9.6 MG/DL (ref 8.5–10.1)
CAOX CRY URNS QL MICRO: ABNORMAL
CHLORIDE SERPL-SCNC: 102 MMOL/L (ref 97–108)
CK SERPL-CCNC: 79 U/L (ref 26–192)
CO2 SERPL-SCNC: 28 MMOL/L (ref 21–32)
COLOR UR: ABNORMAL
CREAT SERPL-MCNC: 0.78 MG/DL (ref 0.55–1.02)
EOSINOPHIL # BLD: 0.3 K/UL (ref 0–0.4)
EOSINOPHIL NFR BLD: 2 % (ref 0–7)
EPITH CASTS URNS QL MICRO: ABNORMAL /LPF
ERYTHROCYTE [DISTWIDTH] IN BLOOD BY AUTOMATED COUNT: 16.6 % (ref 11.5–14.5)
GLOBULIN SER CALC-MCNC: 5.4 G/DL (ref 2–4)
GLUCOSE SERPL-MCNC: 107 MG/DL (ref 65–100)
GLUCOSE UR STRIP.AUTO-MCNC: NEGATIVE MG/DL
HCT VFR BLD AUTO: 40.4 % (ref 35–47)
HGB BLD-MCNC: 12.9 G/DL (ref 11.5–16)
HGB UR QL STRIP: NEGATIVE
KETONES UR QL STRIP.AUTO: 40 MG/DL
LACTATE SERPL-SCNC: 1 MMOL/L (ref 0.4–2)
LEUKOCYTE ESTERASE UR QL STRIP.AUTO: NEGATIVE
LYMPHOCYTES # BLD AUTO: 23 % (ref 12–49)
LYMPHOCYTES # BLD: 3 K/UL (ref 0.8–3.5)
MCH RBC QN AUTO: 27.6 PG (ref 26–34)
MCHC RBC AUTO-ENTMCNC: 31.9 G/DL (ref 30–36.5)
MCV RBC AUTO: 86.3 FL (ref 80–99)
MONOCYTES # BLD: 0.8 K/UL (ref 0–1)
MONOCYTES NFR BLD AUTO: 7 % (ref 5–13)
MUCOUS THREADS URNS QL MICRO: ABNORMAL /LPF
NEUTS SEG # BLD: 8.8 K/UL (ref 1.8–8)
NEUTS SEG NFR BLD AUTO: 68 % (ref 32–75)
NITRITE UR QL STRIP.AUTO: NEGATIVE
PH UR STRIP: 6 [PH] (ref 5–8)
PLATELET # BLD AUTO: 346 K/UL (ref 150–400)
POTASSIUM SERPL-SCNC: 3.6 MMOL/L (ref 3.5–5.1)
PROT SERPL-MCNC: 8.5 G/DL (ref 6.4–8.2)
PROT UR STRIP-MCNC: 30 MG/DL
RBC # BLD AUTO: 4.68 M/UL (ref 3.8–5.2)
RBC #/AREA URNS HPF: ABNORMAL /HPF (ref 0–5)
SODIUM SERPL-SCNC: 136 MMOL/L (ref 136–145)
SP GR UR REFRACTOMETRY: 1.03 (ref 1–1.03)
UROBILINOGEN UR QL STRIP.AUTO: 1 EU/DL (ref 0.2–1)
WBC # BLD AUTO: 13 K/UL (ref 3.6–11)
WBC URNS QL MICRO: ABNORMAL /HPF (ref 0–4)

## 2017-06-06 PROCEDURE — 36415 COLL VENOUS BLD VENIPUNCTURE: CPT | Performed by: EMERGENCY MEDICINE

## 2017-06-06 PROCEDURE — 99285 EMERGENCY DEPT VISIT HI MDM: CPT

## 2017-06-06 PROCEDURE — 82550 ASSAY OF CK (CPK): CPT | Performed by: HOSPITALIST

## 2017-06-06 PROCEDURE — 74011250636 HC RX REV CODE- 250/636: Performed by: HOSPITALIST

## 2017-06-06 PROCEDURE — 65390000012 HC CONDITION CODE 44 OBSERVATION

## 2017-06-06 PROCEDURE — 80053 COMPREHEN METABOLIC PANEL: CPT | Performed by: EMERGENCY MEDICINE

## 2017-06-06 PROCEDURE — 71010 XR CHEST SNGL V: CPT

## 2017-06-06 PROCEDURE — 96372 THER/PROPH/DIAG INJ SC/IM: CPT

## 2017-06-06 PROCEDURE — 96361 HYDRATE IV INFUSION ADD-ON: CPT

## 2017-06-06 PROCEDURE — 74011250637 HC RX REV CODE- 250/637: Performed by: HOSPITALIST

## 2017-06-06 PROCEDURE — 85025 COMPLETE CBC W/AUTO DIFF WBC: CPT | Performed by: EMERGENCY MEDICINE

## 2017-06-06 PROCEDURE — 72100 X-RAY EXAM L-S SPINE 2/3 VWS: CPT

## 2017-06-06 PROCEDURE — 83605 ASSAY OF LACTIC ACID: CPT | Performed by: EMERGENCY MEDICINE

## 2017-06-06 PROCEDURE — 96360 HYDRATION IV INFUSION INIT: CPT

## 2017-06-06 PROCEDURE — 70450 CT HEAD/BRAIN W/O DYE: CPT

## 2017-06-06 PROCEDURE — 81001 URINALYSIS AUTO W/SCOPE: CPT | Performed by: EMERGENCY MEDICINE

## 2017-06-06 PROCEDURE — 72072 X-RAY EXAM THORAC SPINE 3VWS: CPT

## 2017-06-06 PROCEDURE — 94640 AIRWAY INHALATION TREATMENT: CPT

## 2017-06-06 PROCEDURE — 74011000250 HC RX REV CODE- 250: Performed by: HOSPITALIST

## 2017-06-06 PROCEDURE — 65270000029 HC RM PRIVATE

## 2017-06-06 PROCEDURE — 74011250636 HC RX REV CODE- 250/636: Performed by: EMERGENCY MEDICINE

## 2017-06-06 RX ORDER — HEPARIN SODIUM 5000 [USP'U]/ML
5000 INJECTION, SOLUTION INTRAVENOUS; SUBCUTANEOUS EVERY 8 HOURS
Status: DISCONTINUED | OUTPATIENT
Start: 2017-06-06 | End: 2017-06-16 | Stop reason: HOSPADM

## 2017-06-06 RX ORDER — SODIUM CHLORIDE 0.9 % (FLUSH) 0.9 %
5-10 SYRINGE (ML) INJECTION EVERY 8 HOURS
Status: DISCONTINUED | OUTPATIENT
Start: 2017-06-06 | End: 2017-06-16 | Stop reason: HOSPADM

## 2017-06-06 RX ORDER — ACETAMINOPHEN 325 MG/1
650 TABLET ORAL
Status: DISCONTINUED | OUTPATIENT
Start: 2017-06-06 | End: 2017-06-16 | Stop reason: HOSPADM

## 2017-06-06 RX ORDER — NYSTATIN 100000 U/G
CREAM TOPICAL 2 TIMES DAILY
Status: DISCONTINUED | OUTPATIENT
Start: 2017-06-06 | End: 2017-06-16 | Stop reason: HOSPADM

## 2017-06-06 RX ORDER — PANTOPRAZOLE SODIUM 40 MG/1
40 TABLET, DELAYED RELEASE ORAL
Status: DISCONTINUED | OUTPATIENT
Start: 2017-06-07 | End: 2017-06-16 | Stop reason: HOSPADM

## 2017-06-06 RX ORDER — SODIUM CHLORIDE 9 MG/ML
50 INJECTION, SOLUTION INTRAVENOUS CONTINUOUS
Status: DISCONTINUED | OUTPATIENT
Start: 2017-06-06 | End: 2017-06-13

## 2017-06-06 RX ORDER — SODIUM CHLORIDE 9 MG/ML
100 INJECTION, SOLUTION INTRAVENOUS CONTINUOUS
Status: DISCONTINUED | OUTPATIENT
Start: 2017-06-06 | End: 2017-06-08

## 2017-06-06 RX ORDER — SODIUM CHLORIDE 0.9 % (FLUSH) 0.9 %
5-10 SYRINGE (ML) INJECTION AS NEEDED
Status: DISCONTINUED | OUTPATIENT
Start: 2017-06-06 | End: 2017-06-16 | Stop reason: HOSPADM

## 2017-06-06 RX ORDER — THERA TABS 400 MCG
1 TAB ORAL DAILY
Status: DISCONTINUED | OUTPATIENT
Start: 2017-06-07 | End: 2017-06-16 | Stop reason: HOSPADM

## 2017-06-06 RX ORDER — FLUOXETINE 10 MG/1
10 CAPSULE ORAL DAILY
Status: DISCONTINUED | OUTPATIENT
Start: 2017-06-07 | End: 2017-06-16 | Stop reason: HOSPADM

## 2017-06-06 RX ORDER — ARFORMOTEROL TARTRATE 15 UG/2ML
15 SOLUTION RESPIRATORY (INHALATION)
Status: DISCONTINUED | OUTPATIENT
Start: 2017-06-06 | End: 2017-06-16 | Stop reason: HOSPADM

## 2017-06-06 RX ORDER — BUDESONIDE 0.5 MG/2ML
500 INHALANT ORAL
Status: DISCONTINUED | OUTPATIENT
Start: 2017-06-06 | End: 2017-06-16 | Stop reason: HOSPADM

## 2017-06-06 RX ORDER — OMEPRAZOLE 20 MG/1
20 CAPSULE, DELAYED RELEASE ORAL DAILY
Status: DISCONTINUED | OUTPATIENT
Start: 2017-06-07 | End: 2017-06-06 | Stop reason: CLARIF

## 2017-06-06 RX ORDER — IPRATROPIUM BROMIDE AND ALBUTEROL SULFATE 2.5; .5 MG/3ML; MG/3ML
3 SOLUTION RESPIRATORY (INHALATION)
Status: DISCONTINUED | OUTPATIENT
Start: 2017-06-06 | End: 2017-06-16 | Stop reason: HOSPADM

## 2017-06-06 RX ORDER — LEVOTHYROXINE SODIUM 75 UG/1
75 TABLET ORAL
COMMUNITY
End: 2017-11-03 | Stop reason: SDUPTHER

## 2017-06-06 RX ORDER — ONDANSETRON 2 MG/ML
4 INJECTION INTRAMUSCULAR; INTRAVENOUS
Status: DISCONTINUED | OUTPATIENT
Start: 2017-06-06 | End: 2017-06-16 | Stop reason: HOSPADM

## 2017-06-06 RX ORDER — DOCUSATE SODIUM 100 MG/1
100 CAPSULE, LIQUID FILLED ORAL 2 TIMES DAILY
Status: DISCONTINUED | OUTPATIENT
Start: 2017-06-06 | End: 2017-06-11

## 2017-06-06 RX ORDER — TRAMADOL HYDROCHLORIDE AND ACETAMINOPHEN 37.5; 325 MG/1; MG/1
1 TABLET ORAL EVERY 8 HOURS
Status: DISPENSED | OUTPATIENT
Start: 2017-06-06 | End: 2017-06-13

## 2017-06-06 RX ORDER — FLUTICASONE PROPIONATE AND SALMETEROL 250; 50 UG/1; UG/1
1 POWDER RESPIRATORY (INHALATION) 2 TIMES DAILY
Status: DISCONTINUED | OUTPATIENT
Start: 2017-06-06 | End: 2017-06-06

## 2017-06-06 RX ORDER — LEVOTHYROXINE SODIUM 75 UG/1
75 TABLET ORAL
Status: DISCONTINUED | OUTPATIENT
Start: 2017-06-07 | End: 2017-06-16 | Stop reason: HOSPADM

## 2017-06-06 RX ADMIN — TRAMADOL HYDROCHLORIDE AND ACETAMINOPHEN 1 TABLET: 37.5; 325 TABLET, FILM COATED ORAL at 22:04

## 2017-06-06 RX ADMIN — SODIUM CHLORIDE 500 ML: 900 INJECTION, SOLUTION INTRAVENOUS at 14:42

## 2017-06-06 RX ADMIN — BUDESONIDE 500 MCG: 0.5 INHALANT RESPIRATORY (INHALATION) at 22:26

## 2017-06-06 RX ADMIN — HEPARIN SODIUM 5000 UNITS: 5000 INJECTION, SOLUTION INTRAVENOUS; SUBCUTANEOUS at 19:40

## 2017-06-06 RX ADMIN — DOCUSATE SODIUM 100 MG: 100 CAPSULE, LIQUID FILLED ORAL at 19:39

## 2017-06-06 RX ADMIN — SODIUM CHLORIDE 75 ML/HR: 900 INJECTION, SOLUTION INTRAVENOUS at 14:41

## 2017-06-06 RX ADMIN — ARFORMOTEROL TARTRATE 15 MCG: 15 SOLUTION RESPIRATORY (INHALATION) at 22:27

## 2017-06-06 RX ADMIN — SODIUM CHLORIDE 100 ML/HR: 900 INJECTION, SOLUTION INTRAVENOUS at 19:00

## 2017-06-06 NOTE — ED PROVIDER NOTES
HPI Comments: 80 y.o. female with past medical history significant for rheumatoid arthritis, hypothyroidism, depression, compression fracture, COPD, and anemia who presents via EMS with chief complaint of back pain. EMS personnel report picking up the patient from home for a complaint of lower back pain with recent onset. They report the patient has confusion en route to the ED. They report the patient was discharged home from [address] [place] [address] ED yesterday. They report the patient lives with her son at home; report the patient's son declined to come to the ED. Patient reports lower back pain; reports her pain is worse with laying on her back. Patient reports not remembering whether she had a fall. There are no other acute medical concerns at this time. Full history, physical exam, and ROS unable to be obtained due to: poor historian. PCP: Sal Lagunas NP    Note written by Nga Medina, as dictated by Cely Mckeon MD 2:14 PM     The history is provided by the patient and the EMS personnel. Past Medical History:   Diagnosis Date    Bowel and bladder incontinence 5/3/2017    Compression fracture     COPD (chronic obstructive pulmonary disease) ([facility] 12/3/2015    Depression 7/1/2011    History of anemia 5/3/2017    Rheumatoid arthritis ([facility] 7/1/2011    Unspecified hypothyroidism 7/1/2011       History reviewed. No pertinent surgical history. Family History:   Problem Relation Age of Onset    Heart Disease Mother     Migraines Father     Lung Disease Son        Social History     Social History    Marital status:      Spouse name: N/A    Number of children: N/A    Years of education: N/A     Occupational History    Not on file.      Social History Main Topics    Smoking status: Former Smoker    Smokeless tobacco: Never Used    Alcohol use No    Drug use: No    Sexual activity: No     Other Topics Concern     Service No    Blood Transfusions No    Caffeine Concern No    Occupational Exposure No    Hobby Hazards No    Sleep Concern No    Stress Concern No    Weight Concern No    Special Diet Yes     sodt foods    Back Care Yes     compression fx per son    Exercise No    Bike Helmet No    Seat Belt Yes    Self-Exams No     Social History Narrative         ALLERGIES: Pcn [penicillins] and Zoloft [sertraline]    Review of Systems   Unable to perform ROS: Other       Vitals:    06/06/17 1630 06/06/17 1711 06/06/17 1730 06/06/17 1800   BP:  126/64  136/71   Pulse: 91 88 87 82   Resp: 23 18 23 24   Temp:  98.2 °F (36.8 °C)  97.8 °F (36.6 °C)   SpO2: 93% 94% 92% 100%   Weight:       Height:                Physical Exam   Constitutional: She appears well-developed and well-nourished. No distress. Reported to have dry stool on her clothes and fingernails on arrival.    HENT:   Head: Normocephalic and atraumatic. Nose: Nose normal.   Mouth/Throat: Oropharynx is clear and moist.   Eyes: Conjunctivae and EOM are normal. Pupils are equal, round, and reactive to light. No scleral icterus. Neck: Normal range of motion. Neck supple. No JVD present. No tracheal deviation present. No thyromegaly present. No carotid bruits noted. Cardiovascular: Normal rate, regular rhythm, normal heart sounds and intact distal pulses. Exam reveals no gallop and no friction rub. No murmur heard. Pulmonary/Chest: Effort normal and breath sounds normal. No respiratory distress. She has no wheezes. She has no rales. She exhibits no tenderness. Abdominal: Soft. Bowel sounds are normal. She exhibits no distension and no mass. There is no tenderness. There is no rebound and no guarding. Abdomen is benign. Musculoskeletal: Normal range of motion. She exhibits tenderness. She exhibits no edema. Tenderness in the upper and lower back. No bruising noted. Lymphadenopathy:     She has no cervical adenopathy. Neurological: She is alert.    Aphasic; somewhat confused (baseline). Skin: Skin is warm and dry. No rash noted. No erythema. Stool dried on legs, clothes and under the finger nails reported by nursing. Had been cleaned upon my exam.   Psychiatric: She has a normal mood and affect. Her behavior is normal. Judgment and thought content normal.   Nursing note and vitals reviewed. Note written by Nga Joseph, as dictated by Eliz Hernandez MD 2:21 PM      MDM  Number of Diagnoses or Management Options     Amount and/or Complexity of Data Reviewed  Clinical lab tests: ordered and reviewed  Tests in the radiology section of CPT®: ordered and reviewed  Decide to obtain previous medical records or to obtain history from someone other than the patient: yes  Review and summarize past medical records: yes  Discuss the patient with other providers: yes  Independent visualization of images, tracings, or specimens: yes    Risk of Complications, Morbidity, and/or Mortality  Presenting problems: high  Diagnostic procedures: high  Management options: high    Patient Progress  Patient progress: stable    ED Course       Procedures    CONSULT NOTE:  3:50 PM Eliz Hernandez MD spoke with Dr. Awilda Paige, Consult for Hospitalist. Discussed available diagnostic tests and clinical findings. Provider is in agreement with care plans as outlined. Dr. Awilda Paige will see and admit the patient. The patient has compression fractures of T10 and T12. Age undetermined. Patient is obviously unable to care for self and will be admitted for further evaluation and care.  The son never came to the hospital during her stay in the ED

## 2017-06-06 NOTE — PROGRESS NOTES
Noted consult for FTT (failure to thrive), noted nursing indicated dried stool on entire BLE and buttocks. Noted multiple documentations from [facility] NP and pending referral to initiate home based services with [facility]. Pt has open case with Charo Márquez, [phone]. Will reach out to her to determine appropriate disposition pending ED evaluation. TYSON Chand      4:30pm  Noted hospitalist consult and inpt admission. Noted that [facility] is a participating facility with [facility], sent referral via [address] link for consideration noting indication from other providers of concern of son experiencing Caregiver burnout. CM did attempt to reach son Adrián Malhotra, phone [phone] but unable to reach and left message. Unit CM to follow. Home based primary care confirmed that pt is still being followed in the community by Cr Lewis, LUDWIG and Dr. Raul Navarro, physician with home based program is undergoing request for credentialling with [facility], pt's insurer, in order for pt to receive home based PCP services. Expected disposition is SNF.     Conrad Dickson MSW

## 2017-06-06 NOTE — SENIOR SERVICES NOTE
Requested to see patient by CM. Spoke with Rosa Izaguirre from Palliative Care/Home based primary care and she states that Dr. Debora Queen has applied for credentialing  with [facility] to be able to see the patient at home. Attila Asencio reports that patient was seen by Palliative medicine at home in November 2016 and then discharged back into the community as patient was no longer requiring palliative care.

## 2017-06-06 NOTE — ED TRIAGE NOTES
Triage note: pt arrives from home via EMS. Pt reports lower back pain with movement. Pt was discharged from [facility] ED yesterday for same. Pt with some confusion on arrival.Pt lives with son who declined to come to ED.

## 2017-06-06 NOTE — H&P
History & Physical    Date of admission: 6/6/2017    Patient name: Pippa Damon  MRN: [id number]  YOB: 1929  Age: 80 y.o. Primary care provider:  Edwin Hernadez NP     Source of Information: Medical records    Chief complain: back pain, confusion    History of present illness  Pippa Damon is a 80 y.o. female who presents with PMHx of COPD, Hypothyroidism, Hx of compression fracture, who was just seen at [facility] for back pain and released back home. Pt unable to provide any meaningful history due to confusion and probable underlying Dementia. Per records, ems was called for c/o back pain. On arrival, patient covered with stool B/L LE and buttocks and in nails. Pt unable to recall if she had a fall and how long she was on the floor. Pt states she has back pain which is worse when she lays on the back, no radiation of pain. Pt unable to describe or quantify the pain. Pt lives with her son at home. No reports of fever, chills, syncope, n/v/d, abdominal pain, chest pain, sob noted. Past Medical History:   Diagnosis Date    Bowel and bladder incontinence 5/3/2017    Compression fracture     COPD (chronic obstructive pulmonary disease) ([facility] [address] 12/3/2015    Depression 7/1/2011    History of anemia 5/3/2017    Rheumatoid arthritis ([facility] 7/1/2011    Unspecified hypothyroidism 7/1/2011      History reviewed. No pertinent surgical history. Prior to Admission medications    Medication Sig Start Date End Date Taking? Authorizing Provider   baclofen (LIORESAL) 10 mg tablet TAKE 1 TABLET BY MOUTH TWICE DAILY AS NEEDED FOR BACK PAIN/MUSCLE SPASM 5/3/17   Edwin Hernadez NP   albuterol-ipratropium (DUO-NEB) 2.5 mg-0.5 mg/3 ml nebu 3 mL by Nebulization route every six (6) hours as needed. 5/3/17   Edwin Hernadez NP   tiotropium [facility]) 18 mcg inhalation capsule Take 1 Cap by inhalation daily.  5/3/17   Edwin Hernadez NP fluticasone-salmeterol (ADVAIR) 250-50 mcg/dose diskus inhaler Take 1 Puff by inhalation two (2) times a day. 5/3/17   Sal Cost, NP   albuterol (PROAIR HFA) 90 mcg/actuation inhaler TAKE 2 PUFFS BY INHALATION EVERY SIX (6) HOURS AS NEEDED FOR WHEEZING OR SHORTNESS OF BREATH. 5/3/17   Sal Cost, NP   nystatin (MYCOSTATIN) topical cream Apply  to affected area two (2) times a day. Apply under breasts 5/3/17   Sal Cost, NP   FLUoxetine (PROZAC) 10 mg capsule TAKE 1 CAPSULE EVERY DAY 1/6/17   Sal Cost, NP   multivitamin (ONE A DAY) tablet TAKE 1 TABLET EVERY DAY 12/11/16   Sal Cost, NP   zinc oxide 25 % pste Use as directed 2 x day 11/2/16   Sal Cost, NP   levothyroxine (SYNTHROID) 50 mcg tablet  4/6/16   Historical Provider   therapeutic multivitamin (THERAGRAN) tablet Take 1 Tab by mouth daily. 6/11/16   Sal Cost, NP   omeprazole (PRILOSEC) 20 mg capsule TAKE ONE CAPSULE BY MOUTH ONCE DAILY 12/7/15   Sal Cost, NP     Allergies   Allergen Reactions    Pcn [Penicillins] Unknown (comments)    Zoloft [Sertraline] Unknown (comments)      Family History   Problem Relation Age of Onset    Heart Disease Mother     Migraines Father     Lung Disease Son       Family history reviewed and non-contributory. Social history  Patient resides    Independently    X  With family care      Assisted living      SNF    Ambulates    Independently      With cane       Assisted walker         Alcohol history   X  None     Social     Chronic   Smoking history    None   X  Former smoker     Current smoker     History   Smoking Status    Former Smoker   Smokeless Tobacco    Never Used       Code status  X  Full code     DNR/DNI        Code status discussed with the patient/caregivers. No Order    Review of systems  The patient denies any fever, chills, chest pain, cough, congestion, recent illness, palpitations, or dysuria.    A comprehensive review of systems was negative except for that written in the History of Present Illness. The remainder of the review of systems was reviewed and is noncontributory. Physical Examination   Visit Vitals    /87 (BP 1 Location: Right arm, BP Patient Position: Lying left side)    Pulse 93    Temp 97.7 °F (36.5 °C)    Resp 19    Ht 5' 1\" (1.549 m)    Wt 63.5 kg (140 lb)    SpO2 95%    BMI 26.45 kg/m2      O2 Flow Rate (L/min): 2 l/min   O2 Device: Nasal cannula    General:  Awake but confused   Head:  Normocephalic   Eyes:  Conjunctivae/corneas clear. E/N/M/T: Clear oropharynx   Lungs:   Symmetrical chest expansion and respiratory effort  Clear to auscultation bilaterally   Heart:  Regular rhythm   Sounds normal; no murmur, click, rub or gallop   Abdomen:   Soft, no tenderness  Bowel sounds normal   Back: + thoracic spinal tenderness on palpation   Extremities: Extremities normal, atraumatic  No cyanosis or edema  No DVT signs   Pulses 2+ and symmetric all extremities   Skin: B/l elbow bruised, non tender   Musculo-      skeletal: Gait not tested     Neuro: Clear speech, moves all 4 extremities    Psych: Alert, oriented x person and place, not time  Normal affect, judgement and insight         Data Review    24 Hour Results:  Recent Results (from the past 24 hour(s))   CBC WITH AUTOMATED DIFF    Collection Time: 06/06/17  1:05 PM   Result Value Ref Range    WBC 13.0 (H) 3.6 - 11.0 K/uL    RBC 4.68 3.80 - 5.20 M/uL    HGB 12.9 11.5 - 16.0 g/dL    HCT 40.4 35.0 - 47.0 %    MCV 86.3 80.0 - 99.0 FL    MCH 27.6 26.0 - 34.0 PG    MCHC 31.9 30.0 - 36.5 g/dL    RDW 16.6 (H) 11.5 - 14.5 %    PLATELET 444 575 - 102 K/uL    NEUTROPHILS 68 32 - 75 %    LYMPHOCYTES 23 12 - 49 %    MONOCYTES 7 5 - 13 %    EOSINOPHILS 2 0 - 7 %    BASOPHILS 0 0 - 1 %    ABS. NEUTROPHILS 8.8 (H) 1.8 - 8.0 K/UL    ABS. LYMPHOCYTES 3.0 0.8 - 3.5 K/UL    ABS. MONOCYTES 0.8 0.0 - 1.0 K/UL    ABS. EOSINOPHILS 0.3 0.0 - 0.4 K/UL    ABS.  BASOPHILS 0.0 0.0 - 0.1 K/UL   METABOLIC PANEL, COMPREHENSIVE    Collection Time: 06/06/17  1:05 PM   Result Value Ref Range    Sodium 136 136 - 145 mmol/L    Potassium 3.6 3.5 - 5.1 mmol/L    Chloride 102 97 - 108 mmol/L    CO2 28 21 - 32 mmol/L    Anion gap 6 5 - 15 mmol/L    Glucose 107 (H) 65 - 100 mg/dL    BUN 10 6 - 20 MG/DL    Creatinine 0.78 0.55 - 1.02 MG/DL    BUN/Creatinine ratio 13 12 - 20      GFR est AA >60 >60 ml/min/1.73m2    GFR est non-AA >60 >60 ml/min/1.73m2    Calcium 9.6 8.5 - 10.1 MG/DL    Bilirubin, total 0.7 0.2 - 1.0 MG/DL    ALT (SGPT) 17 12 - 78 U/L    AST (SGOT) 22 15 - 37 U/L    Alk. phosphatase 77 45 - 117 U/L    Protein, total 8.5 (H) 6.4 - 8.2 g/dL    Albumin 3.1 (L) 3.5 - 5.0 g/dL    Globulin 5.4 (H) 2.0 - 4.0 g/dL    A-G Ratio 0.6 (L) 1.1 - 2.2     URINALYSIS W/MICROSCOPIC    Collection Time: 06/06/17  1:05 PM   Result Value Ref Range    Color DARK YELLOW      Appearance CLOUDY (A) CLEAR      Specific gravity 1.030 1.003 - 1.030      pH (UA) 6.0 5.0 - 8.0      Protein 30 (A) NEG mg/dL    Glucose NEGATIVE  NEG mg/dL    Ketone 40 (A) NEG mg/dL    Blood NEGATIVE  NEG      Urobilinogen 1.0 0.2 - 1.0 EU/dL    Nitrites NEGATIVE  NEG      Leukocyte Esterase NEGATIVE  NEG      WBC 0-4 0 - 4 /hpf    RBC 0-5 0 - 5 /hpf    Epithelial cells FEW FEW /lpf    Bacteria NEGATIVE  NEG /hpf    Mucus 3+ (A) NEG /lpf    CA Oxalate crystals FEW (A) NEG     BILIRUBIN, CONFIRM    Collection Time: 06/06/17  1:05 PM   Result Value Ref Range    Bilirubin UA, confirm POSITIVE (A) NEG     LACTIC ACID, PLASMA    Collection Time: 06/06/17  2:41 PM   Result Value Ref Range    Lactic acid 1.0 0.4 - 2.0 MMOL/L     Recent Labs      06/06/17   1305   WBC  13.0*   HGB  12.9   HCT  40.4   PLT  346     Recent Labs      06/06/17   1305   NA  136   K  3.6   CL  102   CO2  28   GLU  107*   BUN  10   CREA  0.78   CA  9.6   ALB  3.1*   TBILI  0.7   SGOT  22   ALT  17       Imaging  Xray Spine: Lumbar  IMPRESSION: Lumbar spondylosis.  No fracture or acute abnormality of the lumbar spine. T10 and T12 compression fractures. Xray spine: Thoracic   IMPRESSION: Osteopenia with multiple thoracic spine compression fractures of unknown age. At least some of these were present on previous chest radiographs. Assessment and Plan   Active Problems:    Metabolic encephalopathy (3/6/8530)      Rhabdomyolysis (6/6/2017)      Dehydration (6/6/2017)      Compression fracture of body of thoracic vertebra ([address] (6/6/2017)      1. Back pain likely related to multiple compression fracture  - MRI Thoracic and Lumbar spine  - Tylenol and Tramadol for Pain   - PT/OT    2. Rhabdomyolysis  - monitor CK  - IVF    3. Metabolic Encephalopathy superimposed on underlying mild dementia  - likely related to dehydration  - c/w IVF  -  consulted for discharge  Planning    4. Leukocytosis due to dehydration, no signs of infection at this time    5. Hypothyroidism - c/w Synthroid    6. COPD  - c/w Nebs and Advair      Diet: Regular  Activity: with PT/OT  DVT prophylaxis: hep sq  Isolation precautions: none  Consultations: IR if acute fractures on MRI   Anticipated disposition: 2 days, needs SNF and probable long term placement as patient is not receiving appropriate care at home.        Signed by: So Maloney MD    June 6, 2017 at 4:16 PM

## 2017-06-06 NOTE — PROGRESS NOTES
Primary Nurse Rikki Lara and Lady Kate RN performed a dual skin assessment on this patient Impairment noted- see wound doc flow sheet  Victor M score is 13

## 2017-06-06 NOTE — ED NOTES
Pt arrived covered in dried stool on entire BLE and buttocks. Stool under nails. Pt given bed bath, gown changed, brief changed.

## 2017-06-06 NOTE — ED NOTES
TRANSFER - OUT REPORT:    Verbal report given to Elissa Mcmullen RN(name) on [facility]  being transferred to (unit) for routine progression of care       Report consisted of patients Situation, Background, Assessment and   Recommendations(SBAR). Information from the following report(s) SBAR, ED Summary, Intake/Output, MAR, Recent Results and Cardiac Rhythm SR was reviewed with the receiving nurse. Lines:   Peripheral IV 06/06/17 Left Antecubital (Active)   Site Assessment Clean, dry, & intact 6/6/2017  1:09 PM   Phlebitis Assessment 0 6/6/2017  1:09 PM   Infiltration Assessment 0 6/6/2017  1:09 PM   Dressing Status Clean, dry, & intact 6/6/2017  1:09 PM   Dressing Type Transparent 6/6/2017  1:09 PM   Hub Color/Line Status Pink;Flushed;Patent 6/6/2017  1:09 PM   Action Taken Blood drawn 6/6/2017  1:09 PM        Opportunity for questions and clarification was provided.       Patient transported with:   Pipette

## 2017-06-07 ENCOUNTER — APPOINTMENT (OUTPATIENT)
Dept: MRI IMAGING | Age: 82
DRG: 515 | End: 2017-06-07
Attending: HOSPITALIST
Payer: MEDICARE

## 2017-06-07 LAB
ANION GAP BLD CALC-SCNC: 7 MMOL/L (ref 5–15)
BASOPHILS # BLD AUTO: 0 K/UL (ref 0–0.1)
BASOPHILS # BLD: 0 % (ref 0–1)
BUN SERPL-MCNC: 10 MG/DL (ref 6–20)
BUN/CREAT SERPL: 14 (ref 12–20)
CALCIUM SERPL-MCNC: 8.7 MG/DL (ref 8.5–10.1)
CHLORIDE SERPL-SCNC: 106 MMOL/L (ref 97–108)
CO2 SERPL-SCNC: 27 MMOL/L (ref 21–32)
CREAT SERPL-MCNC: 0.7 MG/DL (ref 0.55–1.02)
EOSINOPHIL # BLD: 0.2 K/UL (ref 0–0.4)
EOSINOPHIL NFR BLD: 2 % (ref 0–7)
ERYTHROCYTE [DISTWIDTH] IN BLOOD BY AUTOMATED COUNT: 16.7 % (ref 11.5–14.5)
GLUCOSE SERPL-MCNC: 82 MG/DL (ref 65–100)
HCT VFR BLD AUTO: 34.8 % (ref 35–47)
HGB BLD-MCNC: 10.5 G/DL (ref 11.5–16)
LYMPHOCYTES # BLD AUTO: 37 % (ref 12–49)
LYMPHOCYTES # BLD: 3 K/UL (ref 0.8–3.5)
MCH RBC QN AUTO: 26.1 PG (ref 26–34)
MCHC RBC AUTO-ENTMCNC: 30.2 G/DL (ref 30–36.5)
MCV RBC AUTO: 86.6 FL (ref 80–99)
MONOCYTES # BLD: 0.4 K/UL (ref 0–1)
MONOCYTES NFR BLD AUTO: 5 % (ref 5–13)
NEUTS SEG # BLD: 4.5 K/UL (ref 1.8–8)
NEUTS SEG NFR BLD AUTO: 56 % (ref 32–75)
PLATELET # BLD AUTO: 261 K/UL (ref 150–400)
POTASSIUM SERPL-SCNC: 3.5 MMOL/L (ref 3.5–5.1)
RBC # BLD AUTO: 4.02 M/UL (ref 3.8–5.2)
SODIUM SERPL-SCNC: 140 MMOL/L (ref 136–145)
WBC # BLD AUTO: 8.2 K/UL (ref 3.6–11)

## 2017-06-07 PROCEDURE — G8978 MOBILITY CURRENT STATUS: HCPCS | Performed by: PHYSICAL THERAPIST

## 2017-06-07 PROCEDURE — 97530 THERAPEUTIC ACTIVITIES: CPT | Performed by: PHYSICAL THERAPIST

## 2017-06-07 PROCEDURE — 80048 BASIC METABOLIC PNL TOTAL CA: CPT | Performed by: HOSPITALIST

## 2017-06-07 PROCEDURE — 74011250636 HC RX REV CODE- 250/636: Performed by: HOSPITALIST

## 2017-06-07 PROCEDURE — 74011000250 HC RX REV CODE- 250: Performed by: HOSPITALIST

## 2017-06-07 PROCEDURE — 72148 MRI LUMBAR SPINE W/O DYE: CPT

## 2017-06-07 PROCEDURE — 96372 THER/PROPH/DIAG INJ SC/IM: CPT

## 2017-06-07 PROCEDURE — 74011250636 HC RX REV CODE- 250/636: Performed by: EMERGENCY MEDICINE

## 2017-06-07 PROCEDURE — G8988 SELF CARE GOAL STATUS: HCPCS

## 2017-06-07 PROCEDURE — 65390000012 HC CONDITION CODE 44 OBSERVATION

## 2017-06-07 PROCEDURE — G8987 SELF CARE CURRENT STATUS: HCPCS

## 2017-06-07 PROCEDURE — 77010033678 HC OXYGEN DAILY

## 2017-06-07 PROCEDURE — 97165 OT EVAL LOW COMPLEX 30 MIN: CPT

## 2017-06-07 PROCEDURE — G8979 MOBILITY GOAL STATUS: HCPCS | Performed by: PHYSICAL THERAPIST

## 2017-06-07 PROCEDURE — 72146 MRI CHEST SPINE W/O DYE: CPT

## 2017-06-07 PROCEDURE — 36415 COLL VENOUS BLD VENIPUNCTURE: CPT | Performed by: HOSPITALIST

## 2017-06-07 PROCEDURE — 99218 HC RM OBSERVATION: CPT

## 2017-06-07 PROCEDURE — 85025 COMPLETE CBC W/AUTO DIFF WBC: CPT | Performed by: HOSPITALIST

## 2017-06-07 PROCEDURE — 74011250637 HC RX REV CODE- 250/637: Performed by: HOSPITALIST

## 2017-06-07 PROCEDURE — 94640 AIRWAY INHALATION TREATMENT: CPT

## 2017-06-07 PROCEDURE — 97162 PT EVAL MOD COMPLEX 30 MIN: CPT | Performed by: PHYSICAL THERAPIST

## 2017-06-07 RX ADMIN — HEPARIN SODIUM 5000 UNITS: 5000 INJECTION, SOLUTION INTRAVENOUS; SUBCUTANEOUS at 03:26

## 2017-06-07 RX ADMIN — SODIUM CHLORIDE 75 ML/HR: 900 INJECTION, SOLUTION INTRAVENOUS at 03:36

## 2017-06-07 RX ADMIN — TRAMADOL HYDROCHLORIDE AND ACETAMINOPHEN 1 TABLET: 37.5; 325 TABLET, FILM COATED ORAL at 21:08

## 2017-06-07 RX ADMIN — LEVOTHYROXINE SODIUM 75 MCG: 75 TABLET ORAL at 07:05

## 2017-06-07 RX ADMIN — FLUOXETINE 10 MG: 10 CAPSULE ORAL at 09:00

## 2017-06-07 RX ADMIN — ARFORMOTEROL TARTRATE 15 MCG: 15 SOLUTION RESPIRATORY (INHALATION) at 07:35

## 2017-06-07 RX ADMIN — DOCUSATE SODIUM 100 MG: 100 CAPSULE, LIQUID FILLED ORAL at 09:51

## 2017-06-07 RX ADMIN — ACETAMINOPHEN 650 MG: 325 TABLET, FILM COATED ORAL at 09:51

## 2017-06-07 RX ADMIN — Medication 8 ML: at 14:00

## 2017-06-07 RX ADMIN — BUDESONIDE 500 MCG: 0.5 INHALANT RESPIRATORY (INHALATION) at 07:35

## 2017-06-07 RX ADMIN — HEPARIN SODIUM 5000 UNITS: 5000 INJECTION, SOLUTION INTRAVENOUS; SUBCUTANEOUS at 12:42

## 2017-06-07 RX ADMIN — HEPARIN SODIUM 5000 UNITS: 5000 INJECTION, SOLUTION INTRAVENOUS; SUBCUTANEOUS at 18:44

## 2017-06-07 RX ADMIN — NYSTATIN: 100000 CREAM TOPICAL at 09:52

## 2017-06-07 RX ADMIN — NYSTATIN: 100000 CREAM TOPICAL at 18:00

## 2017-06-07 RX ADMIN — THERA TABS 1 TABLET: TAB at 09:51

## 2017-06-07 RX ADMIN — ACETAMINOPHEN 650 MG: 325 TABLET, FILM COATED ORAL at 03:36

## 2017-06-07 RX ADMIN — PANTOPRAZOLE SODIUM 40 MG: 40 TABLET, DELAYED RELEASE ORAL at 07:05

## 2017-06-07 NOTE — PROGRESS NOTES
Problem: Mobility Impaired (Adult and Pediatric)  Goal: *Acute Goals and Plan of Care (Insert Text)  Physical Therapy Goals  Initiated 6/7/2017  1. Patient will move from supine to sit and sit to supine in bed with supervision/set-up within 7 day(s). 2. Patient will transfer from bed to chair and chair to bed with contact guard assist using the least restrictive device within 7 day(s). 3. Patient will perform sit to stand with contact guard assist within 7 day(s). 4. Patient will ambulate with minimal assistance for 10 feet with the least restrictive device within 7 day(s). PHYSICAL THERAPY EVALUATION  Patient: Jakob Soriano (87 y.o. female)  Date: 6/7/2017  Primary Diagnosis: Compression fracture of body of thoracic vertebra (HCC)  Dehydration  Rhabdomyolysis  Metabolic encephalopathy  Compression fracture of body of thoracic vertebra [facility])        Precautions:   Fall      ASSESSMENT :  Based on the objective data described below, the patient presents with decreased functional mobility from baseline level of function. Patient received supine and cleared by RN for mobility. Patient is a poor historian and unable to provide any PLOF. Does states that she lives with son but unable to recall anything regarding mobility or home set up. Oriented to self and place (with cues for place) but reports year is 36 and that we are in [place]. Somewhat reluctant to participate with PT but agreeable with encouragement. Supine to sit with Dontae x 2 and extra time. Good sitting balance once at EOB. Sit to stand with Dontae x 1 and CGA of another. Fair standing balance and walker and patient refuses to ambulate further secondary to back pain. Patient returned to supine with modA x 2 and positioned on her side. Based on current level of function recommend SNF level rehab to continue mobility progression. Per chart appears that home situation is not adequate.   She may require long term care if family unable to provide assist she needs. Will continue to follow. Patient will benefit from skilled intervention to address the above impairments. Patients rehabilitation potential is considered to be Good  Factors which may influence rehabilitation potential include:   [ ]         None noted  [X]         Mental ability/status  [ ]         Medical condition  [ ]         Home/family situation and support systems  [X]         Safety awareness  [X]         Pain tolerance/management  [ ]         Other:        PLAN :  Recommendations and Planned Interventions:  [X]           Bed Mobility Training             [ ]    Neuromuscular Re-Education  [X]           Transfer Training                   [ ]    Orthotic/Prosthetic Training  [X]           Gait Training                         [ ]    Modalities  [X]           Therapeutic Exercises           [ ]    Edema Management/Control  [X]           Therapeutic Activities            [X]    Patient and Family Training/Education  [ ]           Other (comment):     Frequency/Duration: Patient will be followed by physical therapy  3 times a week to address goals. Discharge Recommendations: Salbador Montero  Further Equipment Recommendations for Discharge: TBROSIE       SUBJECTIVE:   Patient stated I wonder how I got to [place].       OBJECTIVE DATA SUMMARY:   HISTORY:    Past Medical History:   Diagnosis Date    Bowel and bladder incontinence 5/3/2017    Compression fracture      COPD (chronic obstructive pulmonary disease) ([facility] 12/3/2015    Depression 7/1/2011    History of anemia 5/3/2017    Rheumatoid arthritis ([facility] 7/1/2011    Unspecified hypothyroidism 7/1/2011   History reviewed. No pertinent surgical history. Prior Level of Function/Home Situation: Unknown home situation as patient is poor historian.   Lives with son but otherwise unsure of mobility level  Personal factors and/or comorbidities impacting plan of care:            EXAMINATION/PRESENTATION/DECISION MAKING: Critical Behavior:  Neurologic State: Confused, Alert  Orientation Level: Disoriented to situation, Disoriented to place, Disoriented to time  Cognition: Follows commands, Impaired decision making, Poor safety awareness     Hearing: Auditory  Auditory Impairment: None     Range Of Motion:  AROM: Generally decreased, functional                       Strength:    Strength: Generally decreased, functional           Functional Mobility:  Bed Mobility:     Supine to Sit: Minimum assistance;Assist x2  Sit to Supine: Moderate assistance;Assist x2  Scooting: Minimum assistance; Additional time;Assist x1  Transfers:  Sit to Stand: Minimum assistance; Additional time;Assist x1  Stand to Sit: Contact guard assistance;Assist x1                       Balance:   Sitting: Intact; With support  Standing: Impaired  Standing - Static: Fair  Standing - Dynamic : Poor  Ambulation/Gait Training:           Functional Measure:  Barthel Index:      Bathin  Bladder: 0  Bowels: 0  Groomin  Dressin  Feedin  Mobility: 0  Stairs: 0  Toilet Use: 0  Transfer (Bed to Chair and Back): 5  Total: 10         Barthel and G-code impairment scale:  Percentage of impairment CH  0% CI  1-19% CJ  20-39% CK  40-59% CL  60-79% CM  80-99% CN  100%   Barthel Score 0-100 100 99-80 79-60 59-40 20-39 1-19    0   Barthel Score 0-20 20 17-19 13-16 9-12 5-8 1-4 0      The Barthel ADL Index: Guidelines  1. The index should be used as a record of what a patient does, not as a record of what a patient could do. 2. The main aim is to establish degree of independence from any help, physical or verbal, however minor and for whatever reason. 3. The need for supervision renders the patient not independent. 4. A patient's performance should be established using the best available evidence. Asking the patient, friends/relatives and nurses are the usual sources, but direct observation and common sense are also important.  However direct testing is not needed. 5. Usually the patient's performance over the preceding 24-48 hours is important, but occasionally longer periods will be relevant. 6. Middle categories imply that the patient supplies over 50 per cent of the effort. 7. Use of aids to be independent is allowed. Celene Asp., Barthel, D.W. (2701). Functional evaluation: the Barthel Index. [facility] (14)2. Kvng Garcia elias ARTHUR Soto, Chidi Bearden., Solomonlesa Islas., [place], [address] [place] [address] Ave (1999). Measuring the change indisability after inpatient rehabilitation; comparison of the responsiveness of the Barthel Index and Functional Rooks Measure. Journal of Neurology, Neurosurgery, and Psychiatry, 66(4), 814-043. Nile Skiff, N.[place].A, CHRISTOPHER Centeno, & Rubio Rehman MJOSE DE JESUS. (2004.) Assessment of post-stroke quality of life in cost-effectiveness studies: The usefulness of the Barthel Index and the EuroQoL-5D. Quality of Life Research, 13, 011-40            G codes: In compliance with CMSs Claims Based Outcome Reporting, the following G-code set was chosen for this patient based on their primary functional limitation being treated: The outcome measure chosen to determine the severity of the functional limitation was the Barthel with a score of 10/100 which was correlated with the impairment scale.       · Mobility - Walking and Moving Around:               - CURRENT STATUS:    CM - 80%-99% impaired, limited or restricted               - GOAL STATUS:           CL - 60%-79% impaired, limited or restricted               - D/C STATUS:                       ---------------To be determined---------------      Physical Therapy Evaluation Charge Determination   History Examination Presentation Decision-Making   HIGH Complexity :3+ comorbidities / personal factors will impact the outcome/ POC  MEDIUM Complexity : 3 Standardized tests and measures addressing body structure, function, activity limitation and / or participation in recreation MEDIUM Complexity : Evolving with changing characteristics  Other outcome measures Barthel 10/100  HIGH       Based on the above components, the patient evaluation is determined to be of the following complexity level: MEDIUM     Pain:  Pain Scale 1: Numeric (0 - 10)  Pain Intensity 1: 0  Pain Location 1: Head     Pain Description 1: Aching  Pain Intervention(s) 1: Medication (see MAR)  Activity Tolerance:   VSS  Please refer to the flowsheet for vital signs taken during this treatment. After treatment:   [ ]         Patient left in no apparent distress sitting up in chair  [X]         Patient left in no apparent distress in bed  [X]         Call bell left within reach  [X]         Nursing notified  [ ]         Caregiver present  [ ]         Bed alarm activated      COMMUNICATION/EDUCATION:   The patients plan of care was discussed with: Physical Therapist, Occupational Therapist and Registered Nurse.  [X]         Fall prevention education was provided and the patient/caregiver indicated understanding. [X]         Patient/family have participated as able in goal setting and plan of care. [X]         Patient/family agree to work toward stated goals and plan of care. [ ]         Patient understands intent and goals of therapy, but is neutral about his/her participation. [ ]         Patient is unable to participate in goal setting and plan of care.      Thank you for this referral.  Ruth Rodriguez, PT, DPT   Time Calculation: 15 mins

## 2017-06-07 NOTE — PROGRESS NOTES
Bedside shift change report given to Jonathan Keith (oncoming nurse) by Martha Box (offgoing nurse). Report included the following information SBAR, Kardex, Intake/Output, MAR and Recent Results.

## 2017-06-07 NOTE — ROUTINE PROCESS
Bedside and Verbal shift change report given to Florence Lewis RN (oncoming nurse) by Mami Franks RN (offgoing nurse). Report included the following information SBAR, Kardex, Intake/Output, MAR and Recent Results.

## 2017-06-07 NOTE — PROGRESS NOTES
Care Management-Received message on ED case manager phone from Melanie Lange,  with [facility]. Her number is [phone]. She was returning a call to the ED Senior Services NP. Spoke with NP who said prior to patient being admitted, they were working on trying to see if patient could go to a SNF. They had also been working on getting patient into the [address] [place] [address] Crossing. Updated unit , Jace Aiken RN of the above.     TYSON Patterson

## 2017-06-07 NOTE — PROGRESS NOTES
Copy of code 40, medicare outpatient observation notice, and observation notice left at bedside. CM called and left voicemail and sent email of pt's son, Constantin Lan, who is her POA; to inform him of observation status. No response from son. Pt is confused and unable to sign. Copy left at bedside for family review and CM will follow up.   Pretty Monterroso RN CRM

## 2017-06-07 NOTE — PROGRESS NOTES
CM received call from TYSON Lilly phone [phone], with APS. Ms Raymond is currently the APS worker working with family on care needs and medicaid. Pt lives with her son [address] and he is her care giver. [address] is overwhelmed at times. Pt's POA is Harvinder Moraes (son) phone # [phone], email-  [email or web address]    CM placed call and left message on [employer]'s cell. CM also emailed Harvinder Moraes to contact CM. Ms. Todd has given pt's son's paperwork to complete to apply for Medicaid. Paper work has not been turned in at this time. CM notes referral was sent to [facility] [place] via [address]. Alirio Cunningham RN CRM    [address] received message that  for Oliva Yeh phone # [phone], had called [facility] and left message about discharge planning. CM called and left message on voice mail to return call to this CM. Alirio Cunningham RN CRM    [address] completed [employer] screening and submitted for processing.   Alirio Cunningham RN CRM

## 2017-06-07 NOTE — PROGRESS NOTES
NUTRITION COMPLETE ASSESSMENT    RECOMMENDATIONS:   1.  Staff set-up meal trays and encourage/assist to eat  2. Check B12 (Last B12 282 low normal 6/1/2016) and appetite poor  3. RD attempt ONS, snacks of choice  4. Change Thera Tab MV to THERA-M Plus with minerals (contains 9 mg Fe++ & 12 mcg B12)     Interventions/Plan:   Food/Nutrient Delivery:  Modify diet/texture/consistency/nutrients Commercial supplement (Trial Ensure Compact)        Nutrition Education:     Coordination of Care:    Nutrition Counseling:        Assessment:   Reason for Assessment:     [x]BPA/MST (score 4) Referral     Diet: Mechanical soft  Supplements: RD attempt ONS of choice (Ensure \"too sweet\")  Nutritionally Significant Medications: [x] Reviewed & Includes: Synthroid, Colace, MV  Meal Intake: 10 am breakfast tray untouched. RD gave Ensure Compact, drank ~2 Oz. C/O too sweet. Subjective:  (Very [place]). Not hungary. It depends on time of day if I'm hungary. Ensure too sweet. Chew ok. ?UBW    Objective:  Hx hypothyroid, Rx Synthroid; COPD, RA,  back pain with compression fx. Very [place] with dementia. Appetite poor on admit. Breakfast untouched at bedside 10:00am and no interest in eating. MD noted decreased appetite 7 months ago at [address] 11/2/2016). Lives with son; ? Usual intake, quantity/quality meals. No hx ONS. At risk inadequate fluid intake with poor appetite and resting through meals. Does not express interest in eating or drinking. Full dentures and claims can chew ok; No hx dysphagia. Diet Rx mechanically-altered. RD attempted Ensure Compact (4 Oz.) pt drank ~2 Oz and C/O too sweet. No food preferences given and unable to communicate menu selections with hearing impairment for [facility]. If son available, he can make menu selections. Hx dementia and noted last B12 low normal 282 (6/1/2016). Diet poor B12 with poor appetite. Rx MV provides 9 mcg B12. DRI B12 female age over 79 yrs 2.4 mcg.   HGB 10.5 with hx anemia. Current MV does not contain minerals. Could consider THERA-M PLUS with minerals and higher B12 (12 mcg). May also benefit from calcium in therapeutic MV/minerals. No apparent wt loss in review of recent encounters however, unable to stand on a scale and ? UBW. Slight loss ~5# 3%) x past ~ month and net loss ~4% since March x ~90 days. Pt at nutrition risk with poor appetite and no interest in eating. Estimated Nutrition Needs:   Kcals/day: 1200 Kcals/day  Protein: 65 g (65-70 gm (~1-1.1 gm/kg))  Fluid: 1500 ml (~30 mL/kg IBW)     Based On: Bibi Bowen (MSJ x 1.2 wt maintenance)  Weight Used: 63.5 kg admit (? source))    Pt expected to meet estimated nutrient needs:  [x]  No, not without ONS and meal assist  Comparative Standards:  ;  ;      Nutrition Diagnosis:   1. Inadequate oral food/beverage intake related to appetite and pain  as evidenced by OV PTA MD reported decreased appetite;  Pt reports not hungary and back pain       Goals:     Consume 75% all meals or consume minimum 100% Ensure BID     Monitoring & Evaluation:    - Total energy intake, Protein intake, Oral fluids amount     Previous Nutrition Goals Met:  N/A  Previous Recommendations:      N/A    Education & Discharge Needs:   [x] None Identified   [x] Participated in care plan, discharge planning, and/or interdisciplinary rounds        Cultural, Druze and ethnic food preferences identified:   None    Skin Integrity: [x]Intact    Edema: [x] LE +1 non-pitting  Last BM: 6/6/17 (\"on admit covered in stool\", bowel/bladder incontinence)  Food Allergies: [x]NKFA    Anthropometrics:    Weight Loss Metrics 6/6/2017 5/11/2017 5/3/2017 3/3/2017 6/10/2016 12/3/2015 10/17/2014   Today's Wt 140 lb 145 lb 145 lb 145 lb 12.8 oz - - 155 lb   BMI 26.45 kg/m2 27.4 kg/m2 27.4 kg/m2 27.55 kg/m2 - - 29.3 kg/m2      Last 3 Recorded Weights in this Encounter    06/06/17 1236   Weight: 63.5 kg (140 lb)         Height: 5' 1\" (154.9 cm),    Body mass index is 26.45 kg/(m^2). IBW : 47.6 kg (105 lb),    Usual Body Weight:  (Unknown (Wheelchair/bed bound)),      Labs:    Lab Results   Component Value Date/Time    Sodium 140 06/07/2017 03:33 AM    Potassium 3.5 06/07/2017 03:33 AM    Chloride 106 06/07/2017 03:33 AM    CO2 27 06/07/2017 03:33 AM    Glucose 82 06/07/2017 03:33 AM    BUN 10 06/07/2017 03:33 AM    Creatinine 0.70 06/07/2017 03:33 AM    Calcium 8.7 06/07/2017 03:33 AM    Albumin 3.1 06/06/2017 01:05 PM     Lab Results   Component Value Date/Time    Hemoglobin A1c 5.2 10/10/2014 07:09 AM     Lab Results   Component Value Date/Time    Glucose 82 06/07/2017 03:33 AM    Glucose (POC) 146 10/10/2014 11:45 AM      Lab Results   Component Value Date/Time    ALT (SGPT) 17 06/06/2017 01:05 PM    AST (SGOT) 22 06/06/2017 01:05 PM    Alk.  phosphatase 77 06/06/2017 01:05 PM    Bilirubin, total 0.7 06/06/2017 01:05 PM        Chelsie Byrne RD

## 2017-06-07 NOTE — PROGRESS NOTES
Hospitalist Progress Note  Katerina Garza MD  Office: [phone]  Cell:       Date of Service:  2017  NAME:  Latia Esquivel  :  1929  MRN:  [id number]      Admission Summary:   Latia Esquivel is a 80 y.o. female who presents with PMHx of COPD, Hypothyroidism, Hx of compression fracture, who was just seen at [facility] for back pain and released back home. Pt unable to provide any meaningful history due to confusion and probable underlying Dementia. Per records, ems was called for back pain. On arrival, patient covered with stool B/L LE and buttocks and in nails. Pt unable to recall if she had a fall and how long she was on the floor. Pt states she has back pain which is worse when she lays on the back, no radiation of pain. Pt unable to describe or quantify the pain. Pt lives with her son at home. No reports of fever, chills, syncope, nausea, vomiting, diarrhea, abdominal pain, chest pain, or sob.     Interval history / Subjective:   Back pain better, she said she wants to go home,      Assessment & Plan:     Back pain likely related to multiple compression fracture  - MRI Thoracic and Lumbar spine ordered but not done  - continue Tylenol and Tramadol for Pain   - PT/OT      Metabolic Encephalopathy superimposed on underlying mild dementia  -CT Head no acute process  - likely related to dehydration  - continue with normal saline       Leukocytosis due to dehydration  -no signs of infection at this time  -resolved      Hypothyroidism  -continue with Synthroid     COPD   -stable  -SaO2 96% on RA  -continue pulmicort,prn duo neb        Code status: Full Code  DVT prophylaxis: heparin    Care Plan discussed with: Patient/Family, Nurse and   Disposition: TBD     Hospital Problems  Date Reviewed: 5/3/2017          Codes Class Noted POA    Metabolic encephalopathy KOB-45-: G93.41  ICD-9-CM: 348.31  2017 Yes Rhabdomyolysis ICD-10-CM: M62.82  ICD-9-CM: 728.88  6/6/2017 Yes        Dehydration ICD-10-CM: E86.0  ICD-9-CM: 276.51  6/6/2017 Yes        * (Principal)Compression fracture of body of thoracic vertebra ([facility] ICD-10-CM: M48.54XA  ICD-9-CM: 733.13  6/6/2017 Yes                Vital Signs:    Last 24hrs VS reviewed since prior progress note. Most recent are:  Visit Vitals    /52 (BP 1 Location: Right arm, BP Patient Position: At rest)    Pulse 76    Temp 97.1 °F (36.2 °C)    Resp 17    Ht 5' 1\" (1.549 m)    Wt 63.5 kg (140 lb)    SpO2 94%    BMI 26.45 kg/m2         Intake/Output Summary (Last 24 hours) at 06/07/17 1243  Last data filed at 06/06/17 1306   Gross per 24 hour   Intake                0 ml   Output              100 ml   Net             -100 ml        Physical Examination:             Constitutional:  No acute distress, cooperative, pleasant    ENT:  Oral mucous moist, oropharynx benign. Neck supple,    Resp:  CTA bilaterally. No wheezing/rhonchi/rales. No accessory muscle use   CV:  Regular rhythm, normal rate, no murmurs, gallops, rubs    GI:  Soft, non distended, non tender. normoactive bowel sounds, no hepatosplenomegaly     Musculoskeletal:  No edema    Neurologic:  Conscious and alert, answer simple questions and follows simple commands, motor UE 5/5, LE 2/5     Skin:  no [facility]       Data Review:    Review and/or order of clinical lab test      Labs:     Recent Labs      06/07/17   0333  06/06/17   1305   WBC  8.2  13.0*   HGB  10.5*  12.9   HCT  34.8*  40.4   PLT  261  346     Recent Labs      06/07/17   0333  06/06/17   1305   NA  140  136   K  3.5  3.6   CL  106  102   CO2  27  28   BUN  10  10   CREA  0.70  0.78   GLU  82  107*   CA  8.7  9.6     Recent Labs      06/06/17   1305   SGOT  22   ALT  17   AP  77   TBILI  0.7   TP  8.5*   ALB  3.1*   GLOB  5.4*     No results for input(s): INR, PTP, APTT in the last 72 hours.     No lab exists for component: INREXT   No results for input(s): FE, TIBC, PSAT, FERR in the last 72 hours. Lab Results   Component Value Date/Time    Folate 17.7 06/10/2016 04:33 PM      No results for input(s): PH, PCO2, PO2 in the last 72 hours.   Recent Labs      06/06/17   1305   CPK  79     Lab Results   Component Value Date/Time    Cholesterol, total 174 06/10/2016 04:33 PM    HDL Cholesterol 62 06/10/2016 04:33 PM    LDL, calculated 86 06/10/2016 04:33 PM    Triglyceride 128 06/10/2016 04:33 PM     Lab Results   Component Value Date/Time    Glucose (POC) 146 10/10/2014 11:45 AM    Glucose (POC) 176 10/10/2014 07:24 AM    Glucose (POC) 194 10/09/2014 09:17 PM    Glucose (POC) 185 10/09/2014 06:50 PM     Lab Results   Component Value Date/Time    Color DARK YELLOW 06/06/2017 01:05 PM    Appearance CLOUDY 06/06/2017 01:05 PM    Specific gravity 1.030 06/06/2017 01:05 PM    Specific gravity 1.030 05/11/2017 01:59 PM    pH (UA) 6.0 06/06/2017 01:05 PM    Protein 30 06/06/2017 01:05 PM    Glucose NEGATIVE  06/06/2017 01:05 PM    Ketone 40 06/06/2017 01:05 PM    Bilirubin NEGATIVE  10/09/2014 02:17 PM    Urobilinogen 1.0 06/06/2017 01:05 PM    Nitrites NEGATIVE  06/06/2017 01:05 PM    Leukocyte Esterase NEGATIVE  06/06/2017 01:05 PM    Epithelial cells FEW 06/06/2017 01:05 PM    Bacteria NEGATIVE  06/06/2017 01:05 PM    WBC 0-4 06/06/2017 01:05 PM    RBC 0-5 06/06/2017 01:05 PM         Medications Reviewed:     Current Facility-Administered Medications   Medication Dose Route Frequency    0.9% sodium chloride infusion  75 mL/hr IntraVENous CONTINUOUS    albuterol-ipratropium (DUO-NEB) 2.5 MG-0.5 MG/3 ML  3 mL Nebulization Q6H PRN    FLUoxetine (PROzac) capsule 10 mg  10 mg Oral DAILY    levothyroxine (SYNTHROID) tablet 75 mcg  75 mcg Oral ACB    therapeutic multivitamin (THERAGRAN) tablet 1 Tab  1 Tab Oral DAILY    nystatin (MYCOSTATIN) 100,000 unit/gram cream   Topical BID    sodium chloride (NS) flush 5-10 mL  5-10 mL IntraVENous Q8H    sodium chloride (NS) flush 5-10 mL  5-10 mL IntraVENous PRN    0.9% sodium chloride infusion  100 mL/hr IntraVENous CONTINUOUS    acetaminophen (TYLENOL) tablet 650 mg  650 mg Oral Q4H PRN    ondansetron (ZOFRAN) injection 4 mg  4 mg IntraVENous Q4H PRN    docusate sodium (COLACE) capsule 100 mg  100 mg Oral BID    heparin (porcine) injection 5,000 Units  5,000 Units SubCUTAneous Q8H    traMADol-acetaminophen (ULTRACET) per tablet 1 Tab  1 Tab Oral Q8H    pantoprazole (PROTONIX) tablet 40 mg  40 mg Oral ACB    arformoterol (BROVANA) neb solution 15 mcg  15 mcg Nebulization BID RT    And    budesonide (PULMICORT) 500 mcg/2 ml nebulizer suspension  500 mcg Nebulization BID RT     ______________________________________________________________________  EXPECTED LENGTH OF STAY: - - -  ACTUAL LENGTH OF STAY:          1                 James Reeves MD

## 2017-06-07 NOTE — PROGRESS NOTES
Problem: Self Care Deficits Care Plan (Adult)  Goal: *Acute Goals and Plan of Care (Insert Text)  Occupational Therapy Goals  Initiated 6/7/2017  1. Patient will perform grooming seated EOB for 5 minutes unsupported with supervision/set-up within 7 day(s). 2. Patient will perform UB bathing seated with minimal assistance/contact guard assist within 7 day(s). 3. Patient will perform toilet transfers to [address] [place] [address] with maximal assistance within 7 day(s). 4. Patient will perform all aspects of toileting with moderate assistance within 7 day(s). 5. Patient will participate in upper extremity therapeutic exercise/activities with supervision/set-up for 10 minutes within 7 day(s). 6. Patient will utilize energy conservation techniques during functional activities with verbal cues within 7 day(s). OCCUPATIONAL THERAPY EVALUATION  Patient: Corrie Quintero [de-identified]80 y.o. female)  Date: 6/7/2017  Primary Diagnosis: Compression fracture of body of thoracic vertebra (HCC)  Dehydration  Rhabdomyolysis  Metabolic encephalopathy  Compression fracture of body of thoracic vertebra [facility])        Precautions:   Fall      ASSESSMENT :  Based on the objective data described below, the patient presents with very [place], confusion and dementia at baseline (oriented to person only), back pain, decreased functional strength and activity tolerance, impaired sitting and standing balance, decreased attention/concentration and cognition, decreased functional reach to feet, limiting her independence and safety with ADL routine. Pt received alert in bed eating lunch with set-up. Pt required encouragement to participate with therapy. Pt was unable to provided prior level of assistance with ADLs; per chart review, pt lives with son who is unable to manage her care at home. Pt is overall supervision/set-up to totalA for self-care tasks at this time; she was able to perform bed mobility at [address], and sit<>stand using RW with minAx2.  In standing, pt unable to ambulate to bedside chair/BSC at this time; she reports she transfers to wheelchair with son assisting and primarily stays in bed at home. Due to home situation and level of assistance with ADLs at this time, recommend rehab at discharge, skilled nursing level at this time. Patient will benefit from skilled intervention to address the above impairments. Patients rehabilitation potential is considered to be Fair  Factors which may influence rehabilitation potential include:   [ ]             None noted  [X]             Mental ability/status  [ ]             Medical condition  [X]             Home/family situation and support systems  [ ]             Safety awareness  [ ]             Pain tolerance/management  [ ]             Other:        PLAN :  Recommendations and Planned Interventions:  [X]               Self Care Training                  [X]        Therapeutic Activities  [X]               Functional Mobility Training    [ ]        Cognitive Retraining  [X]               Therapeutic Exercises           [X]        Endurance Activities  [X]               Balance Training                   [ ]        Neuromuscular Re-Education  [ ]               Visual/Perceptual Training     [X]   Home Safety Training  [X]               Patient Education                 [X]        Family Training/Education  [ ]               Other (comment):     Frequency/Duration: Patient will be followed by occupational therapy 3 times a week to address goals. Discharge Recommendations: Skilled Nursing Facility  Further Equipment Recommendations for Discharge: TBD at rehab       SUBJECTIVE:   Patient stated I can't hear a thing you're saying.       OBJECTIVE DATA SUMMARY:   HISTORY:   Past Medical History:   Diagnosis Date    Bowel and bladder incontinence 5/3/2017    Compression fracture      COPD (chronic obstructive pulmonary disease) ([facility] [address] 12/3/2015    Depression 7/1/2011    History of anemia 5/3/2017    Rheumatoid arthritis ([facility] [address] 7/1/2011    Unspecified hypothyroidism 7/1/2011   History reviewed. No pertinent surgical history. Prior Level of Function/Home Situation: Pt unable to provide full prior level; states she lives with son, primarily stays in bed, son assist with transfer to wheelchair. Incontinent. H/o falls. Unsure of level of assists with ADLs. Expanded or extensive additional review of patient history:      Home Situation  Home Environment: Private residence  Living Alone: No  Support Systems: Child(mio)  Patient Expects to be Discharged to[de-identified] Unknown  Current DME Used/Available at Home: Wheelchair  [X]  Right hand dominant             [ ]  Left hand dominant     EXAMINATION OF PERFORMANCE DEFICITS:  Cognitive/Behavioral Status:  Neurologic State: Alert;Confused  Orientation Level: Oriented to person;Disoriented to place; Disoriented to situation;Disoriented to time  Cognition: Follows commands;Decreased attention/concentration;Memory loss  Perception: Appears intact  Perseveration: No perseveration noted  Safety/Judgement: Decreased awareness of environment;Decreased awareness of need for assistance;Decreased awareness of need for safety;Decreased insight into deficits     Skin: appears intact     Edema: none noted in BUEs     Hearing: Auditory  Auditory Impairment: None     Vision/Perceptual:    Tracking: Able to track stimulus in all quadrants w/o difficulty       Range of Motion:  LUE shoulder flexion/abduction/ER limited > RUE     AROM: Generally decreased, functional  PROM: Within functional limits     Strength:     Strength: Generally decreased, functional                 Coordination:  Coordination: Within functional limits  Fine Motor Skills-Upper: Left Intact; Right Intact    Gross Motor Skills-Upper: Left Intact; Right Intact     Tone & Sensation:     Tone: Normal  Sensation: Intact (no c/o numbess/tingling, could detect light touch to BLEs)                       Balance:  Sitting: Intact; With support  Standing: Impaired; With support  Standing - Static: Fair  Standing - Dynamic : Poor     Functional Mobility and Transfers for ADLs:  Bed Mobility:  Supine to Sit: Minimum assistance;Assist x2  Sit to Supine: Moderate assistance;Assist x2  Scooting: Minimum assistance; Additional time;Assist x1     Transfers:  Sit to Stand: Minimum assistance;Assist x2; Additional time; Adaptive equipment (RW)  Stand to Sit: Contact guard assistance;Assist x2; Additional time; Adaptive equipment  Toilet Transfer : Total assistance (pt unable to side step from EOB with RW)     ADL Assessment:  Feeding: Supervision;Setup     Oral Facial Hygiene/Grooming: Supervision;Setup     Bathing: Maximum assistance     Upper Body Dressing: Minimum assistance     Lower Body Dressing: Total assistance     Toileting: Maximum assistance; Adaptive equipment; Additional time;Assist x1 (inferred )                 ADL Intervention and task modifications:        Cognitive Retraining  Safety/Judgement: Decreased awareness of environment;Decreased awareness of need for assistance;Decreased awareness of need for safety;Decreased insight into deficits        Functional Measure:  Barthel Index:      Bathin  Bladder: 0  Bowels: 0  Groomin  Dressin  Feeding: 10  Mobility: 0  Stairs: 0  Toilet Use: 5  Transfer (Bed to Chair and Back): 5  Total: 25         Barthel and G-code impairment scale:  Percentage of impairment CH  0% CI  1-19% CJ  20-39% CK  40-59% CL  60-79% CM  80-99% CN  100%   Barthel Score 0-100 100 99-80 79-60 59-40 20-39 1-19    0   Barthel Score 0-20 20 17-19 13-16 9-12 5-8 1-4 0      The Barthel ADL Index: Guidelines  1. The index should be used as a record of what a patient does, not as a record of what a patient could do. 2. The main aim is to establish degree of independence from any help, physical or verbal, however minor and for whatever reason. 3. The need for supervision renders the patient not independent.   4. A patient's performance should be established using the best available evidence. Asking the patient, friends/relatives and nurses are the usual sources, but direct observation and common sense are also important. However direct testing is not needed. 5. Usually the patient's performance over the preceding 24-48 hours is important, but occasionally longer periods will be relevant. 6. Middle categories imply that the patient supplies over 50 per cent of the effort. 7. Use of aids to be independent is allowed. Tari Severe., Barthel, DGilmarW. (4122). Functional evaluation: the Barthel Index. [facility] (14)2. Bryan Wheatley elias SALEEM SotoF, Marlin Isabel., Shlomo Hernandez., [place], [address] (1999). Measuring the change indisability after inpatient rehabilitation; comparison of the responsiveness of the Barthel Index and Functional [place] Measure. Journal of Neurology, Neurosurgery, and Psychiatry, 66(4), 384-189. Beni Mercado NKATLYN.A, CHRISTOPHER Cneteno, & Lo Roblero MGilmarA. (2004.) Assessment of post-stroke quality of life in cost-effectiveness studies: The usefulness of the Barthel Index and the EuroQoL-5D. Quality of Life Research, 13, 988-87            G codes: In compliance with CMSs Claims Based Outcome Reporting, the following G-code set was chosen for this patient based on their primary functional limitation being treated: The outcome measure chosen to determine the severity of the functional limitation was the Barthel Index with a score of 25/100 which was correlated with the impairment scale.       · Self Care:               - CURRENT STATUS:    CL - 60%-79% impaired, limited or restricted               - GOAL STATUS:           CK - 40%-59% impaired, limited or restricted               - D/C STATUS:                       ---------------To be determined---------------      Occupational Therapy Evaluation Charge Determination   History Examination Decision-Making   MEDIUM Complexity : Expanded review of history including physical, cognitive and psychosocial  history  MEDIUM Complexity : 3-5 performance deficits relating to physical, cognitive , or psychosocial skils that result in activity limitations and / or participation restrictions MEDIUM Complexity : Patient may present with comorbidities that affect occupational performnce. Miniml to moderate modification of tasks or assistance (eg, physical or verbal ) with assesment(s) is necessary to enable patient to complete evaluation       Based on the above components, the patient evaluation is determined to be of the following complexity level: MEDIUM  Activity Tolerance:   VSS, fair tolerance. Please refer to the flowsheet for vital signs taken during this treatment. After treatment:   [ ] Patient left in no apparent distress sitting up in chair  [X] Patient left in no apparent distress in bed  [X] Call bell left within reach  [X] Nursing notified  [ ] Caregiver present  [ ] Bed alarm activated      COMMUNICATION/EDUCATION:   The patients plan of care was discussed with: Physical Therapist, Registered Nurse and . [ ] Home safety education was provided and the patient/caregiver indicated understanding. [ ] Patient/family have participated as able in goal setting and plan of care. [ ] Patient/family agree to work toward stated goals and plan of care. [X] Patient understands intent and goals of therapy, but is neutral about his/her participation. [ ] Patient is unable to participate in goal setting and plan of care. This patients plan of care is appropriate for delegation to [facility].      Thank you for this referral.  Mina Springer OT  Time Calculation: 16 mins

## 2017-06-08 PROCEDURE — 94640 AIRWAY INHALATION TREATMENT: CPT

## 2017-06-08 PROCEDURE — 77010033678 HC OXYGEN DAILY

## 2017-06-08 PROCEDURE — 96372 THER/PROPH/DIAG INJ SC/IM: CPT

## 2017-06-08 PROCEDURE — 74011000250 HC RX REV CODE- 250: Performed by: HOSPITALIST

## 2017-06-08 PROCEDURE — 99218 HC RM OBSERVATION: CPT

## 2017-06-08 PROCEDURE — 74011250636 HC RX REV CODE- 250/636: Performed by: HOSPITALIST

## 2017-06-08 PROCEDURE — 74011250637 HC RX REV CODE- 250/637: Performed by: HOSPITALIST

## 2017-06-08 RX ADMIN — THERA TABS 1 TABLET: TAB at 09:15

## 2017-06-08 RX ADMIN — PANTOPRAZOLE SODIUM 40 MG: 40 TABLET, DELAYED RELEASE ORAL at 06:35

## 2017-06-08 RX ADMIN — DOCUSATE SODIUM 100 MG: 100 CAPSULE, LIQUID FILLED ORAL at 18:48

## 2017-06-08 RX ADMIN — ARFORMOTEROL TARTRATE 15 MCG: 15 SOLUTION RESPIRATORY (INHALATION) at 08:39

## 2017-06-08 RX ADMIN — FLUOXETINE 10 MG: 10 CAPSULE ORAL at 09:15

## 2017-06-08 RX ADMIN — BUDESONIDE 500 MCG: 0.5 INHALANT RESPIRATORY (INHALATION) at 22:01

## 2017-06-08 RX ADMIN — TRAMADOL HYDROCHLORIDE AND ACETAMINOPHEN 1 TABLET: 37.5; 325 TABLET, FILM COATED ORAL at 06:35

## 2017-06-08 RX ADMIN — NYSTATIN: 100000 CREAM TOPICAL at 18:52

## 2017-06-08 RX ADMIN — HEPARIN SODIUM 5000 UNITS: 5000 INJECTION, SOLUTION INTRAVENOUS; SUBCUTANEOUS at 11:44

## 2017-06-08 RX ADMIN — NYSTATIN: 100000 CREAM TOPICAL at 11:45

## 2017-06-08 RX ADMIN — HEPARIN SODIUM 5000 UNITS: 5000 INJECTION, SOLUTION INTRAVENOUS; SUBCUTANEOUS at 18:48

## 2017-06-08 RX ADMIN — TRAMADOL HYDROCHLORIDE AND ACETAMINOPHEN 1 TABLET: 37.5; 325 TABLET, FILM COATED ORAL at 21:43

## 2017-06-08 RX ADMIN — ARFORMOTEROL TARTRATE 15 MCG: 15 SOLUTION RESPIRATORY (INHALATION) at 22:01

## 2017-06-08 RX ADMIN — HEPARIN SODIUM 5000 UNITS: 5000 INJECTION, SOLUTION INTRAVENOUS; SUBCUTANEOUS at 03:44

## 2017-06-08 RX ADMIN — BUDESONIDE 500 MCG: 0.5 INHALANT RESPIRATORY (INHALATION) at 08:39

## 2017-06-08 RX ADMIN — TRAMADOL HYDROCHLORIDE AND ACETAMINOPHEN 1 TABLET: 37.5; 325 TABLET, FILM COATED ORAL at 15:35

## 2017-06-08 RX ADMIN — DOCUSATE SODIUM 100 MG: 100 CAPSULE, LIQUID FILLED ORAL at 09:15

## 2017-06-08 RX ADMIN — LEVOTHYROXINE SODIUM 75 MCG: 75 TABLET ORAL at 06:35

## 2017-06-08 NOTE — PROGRESS NOTES
..Bedside and Verbal shift change report given to Josefina (oncoming nurse) by Bob Donaldson (offgoing nurse). Report included the following information SBAR.

## 2017-06-08 NOTE — PROGRESS NOTES
Hospitalist Progress Note  Raul Burnett MD  Office: [phone]  Cell: [phone]      Date of Service:  2017  NAME:  Galnia Chopra  :  1929  MRN:  [id number]      Admission Summary:   Galina Chopra is a 80 y.o. female who presents with PMHx of COPD, Hypothyroidism, Hx of compression fracture, who was just seen at [facility] for back pain and released back home. Pt unable to provide any meaningful history due to confusion and probable underlying Dementia. Per records, ems was called for back pain. On arrival, patient covered with stool B/L LE and buttocks and in nails. Pt unable to recall if she had a fall and how long she was on the floor. Pt states she has back pain which is worse when she lays on the back, no radiation of pain. Pt unable to describe or quantify the pain. Pt lives with her son at home. No reports of fever, chills, syncope, nausea, vomiting, diarrhea, abdominal pain, chest pain, or sob. Interval history / Subjective:   Back pain better, she said she wants to go home,      Assessment & Plan:     Back pain likely related to multiple compression fracture  -MRI Thoracic on  acute mild/moderate compression fractures at T6 and T10. No evidence of extension into the posterior elements. No significant bulging/retropulsion, chronic compression fractures are noted at T8, T9, and T12, multilevel degenerative changes described above. -MRI Lumbar spine acute vertebral compression deformity along the superior endplate of R98 without cortical retropulsion or epidural hematoma.  mild multilevel disc and facet degenerative change with minimal  anterolisthesis of L5 on S1, mild canal and right foraminal stenosis at L4-5, mild bilateral foraminal stenoses L3-4.  -IR consulted  - continue Tylenol and Tramadol for Pain   - PT/OT      Metabolic Encephalopathy superimposed on underlying mild dementia  -CT Head no acute process  - likely related to dehydration  - continue with normal saline       Leukocytosis due to dehydration  -no signs of infection at this time  -resolved      Hypothyroidism  -continue with Synthroid     COPD   -stable  -SaO2 96% on RA  -continue pulmicort,prn duo neb        Code status: Full Code  DVT prophylaxis: heparin    Care Plan discussed with: Patient/Family, Nurse and   Disposition: SNF      Hospital Problems  Date Reviewed: 5/3/2017          Codes Class Noted POA    Metabolic encephalopathy [facility]-02-LM: G93.41  ICD-9-CM: 348.31  6/6/2017 Yes        Rhabdomyolysis ICD-10-CM: M62.82  ICD-9-CM: 728.88  6/6/2017 Yes        Dehydration ICD-10-CM: E86.0  ICD-9-CM: 276.51  6/6/2017 Yes        * (Principal)Compression fracture of body of thoracic vertebra ([facility] [address] ICD-10-CM: M48.54XA  ICD-9-CM: 733.13  6/6/2017 Yes                Vital Signs:    Last 24hrs VS reviewed since prior progress note. Most recent are:  Visit Vitals    /67    Pulse 78    Temp 98.2 °F (36.8 °C)    Resp 14    Ht 5' 1\" (1.549 m)    Wt 63.5 kg (140 lb)    SpO2 95%    BMI 26.45 kg/m2       No intake or output data in the 24 hours ending 06/08/17 1007     Physical Examination:             Constitutional:  No acute distress, cooperative, pleasant    ENT:  Oral mucous moist, oropharynx benign. Neck supple,    Resp:  CTA bilaterally. No wheezing/rhonchi/rales. No accessory muscle use   CV:  Regular rhythm, normal rate, no murmurs, gallops, rubs    GI:  Soft, non distended, non tender.  normoactive bowel sounds, no hepatosplenomegaly     Musculoskeletal:  No edema    Neurologic:  Conscious and alert, answer simple questions and follows simple commands, motor UE 5/5, LE 2/5     Skin:  no [facility]       Data Review:    Review and/or order of clinical lab test      Labs:     Recent Labs      06/07/17   0333  06/06/17   1305   WBC  8.2  13.0*   HGB  10.5*  12.9   HCT  34.8*  40.4   PLT  261  346     Recent Labs      06/07/17 0333 06/06/17   1305   NA  140  136   K  3.5  3.6   CL  106  102   CO2  27  28   BUN  10  10   CREA  0.70  0.78   GLU  82  107*   CA  8.7  9.6     Recent Labs      06/06/17   1305   SGOT  22   ALT  17   AP  77   TBILI  0.7   TP  8.5*   ALB  3.1*   GLOB  5.4*     No results for input(s): INR, PTP, APTT in the last 72 hours. No lab exists for component: INREXT, INREXT   No results for input(s): FE, TIBC, PSAT, FERR in the last 72 hours. Lab Results   Component Value Date/Time    Folate 17.7 06/10/2016 04:33 PM      No results for input(s): PH, PCO2, PO2 in the last 72 hours.   Recent Labs      06/06/17   1305   CPK  79     Lab Results   Component Value Date/Time    Cholesterol, total 174 06/10/2016 04:33 PM    HDL Cholesterol 62 06/10/2016 04:33 PM    LDL, calculated 86 06/10/2016 04:33 PM    Triglyceride 128 06/10/2016 04:33 PM     Lab Results   Component Value Date/Time    Glucose (POC) 146 10/10/2014 11:45 AM    Glucose (POC) 176 10/10/2014 07:24 AM    Glucose (POC) 194 10/09/2014 09:17 PM    Glucose (POC) 185 10/09/2014 06:50 PM     Lab Results   Component Value Date/Time    Color DARK YELLOW 06/06/2017 01:05 PM    Appearance CLOUDY 06/06/2017 01:05 PM    Specific gravity 1.030 06/06/2017 01:05 PM    Specific gravity 1.030 05/11/2017 01:59 PM    pH (UA) 6.0 06/06/2017 01:05 PM    Protein 30 06/06/2017 01:05 PM    Glucose NEGATIVE  06/06/2017 01:05 PM    Ketone 40 06/06/2017 01:05 PM    Bilirubin NEGATIVE  10/09/2014 02:17 PM    Urobilinogen 1.0 06/06/2017 01:05 PM    Nitrites NEGATIVE  06/06/2017 01:05 PM    Leukocyte Esterase NEGATIVE  06/06/2017 01:05 PM    Epithelial cells FEW 06/06/2017 01:05 PM    Bacteria NEGATIVE  06/06/2017 01:05 PM    WBC 0-4 06/06/2017 01:05 PM    RBC 0-5 06/06/2017 01:05 PM         Medications Reviewed:     Current Facility-Administered Medications   Medication Dose Route Frequency    0.9% sodium chloride infusion  75 mL/hr IntraVENous CONTINUOUS    albuterol-ipratropium (DUO-NEB) 2.5 MG-0.5 MG/3 ML  3 mL Nebulization Q6H PRN    FLUoxetine (PROzac) capsule 10 mg  10 mg Oral DAILY    levothyroxine (SYNTHROID) tablet 75 mcg  75 mcg Oral ACB    therapeutic multivitamin (THERAGRAN) tablet 1 Tab  1 Tab Oral DAILY    nystatin (MYCOSTATIN) 100,000 unit/gram cream   Topical BID    sodium chloride (NS) flush 5-10 mL  5-10 mL IntraVENous Q8H    sodium chloride (NS) flush 5-10 mL  5-10 mL IntraVENous PRN    0.9% sodium chloride infusion  100 mL/hr IntraVENous CONTINUOUS    acetaminophen (TYLENOL) tablet 650 mg  650 mg Oral Q4H PRN    ondansetron (ZOFRAN) injection 4 mg  4 mg IntraVENous Q4H PRN    docusate sodium (COLACE) capsule 100 mg  100 mg Oral BID    heparin (porcine) injection 5,000 Units  5,000 Units SubCUTAneous Q8H    traMADol-acetaminophen (ULTRACET) per tablet 1 Tab  1 Tab Oral Q8H    pantoprazole (PROTONIX) tablet 40 mg  40 mg Oral ACB    arformoterol (BROVANA) neb solution 15 mcg  15 mcg Nebulization BID RT    And    budesonide (PULMICORT) 500 mcg/2 ml nebulizer suspension  500 mcg Nebulization BID RT     ______________________________________________________________________  EXPECTED LENGTH OF STAY: 5d 12h  ACTUAL LENGTH OF STAY:          1                 Gordy Montez MD

## 2017-06-08 NOTE — PROGRESS NOTES
Physical Therapy    Reviewed pt chart, cleared by RN. Pt received supine in bed in deep sleep but awakened after saying name 2x. Pt deferring therapy at this time, stating \"maybe later\" while falling back to sleep as she was unable to keep EO. Offered bed exercises to pt but pt deferring as well. Pt reported no pain at this time. Will continue to follow.      Sebastian Zimmer

## 2017-06-08 NOTE — PROGRESS NOTES
Spiritual Care Partner Volunteer visited patient in [address] [place] [address] [address] on 6/8/17. Documented by:  Arianna Bustillo M.Div.    Paging Service [phone])

## 2017-06-09 ENCOUNTER — APPOINTMENT (OUTPATIENT)
Dept: INTERVENTIONAL RADIOLOGY/VASCULAR | Age: 82
DRG: 515 | End: 2017-06-09
Attending: HOSPITALIST
Payer: MEDICARE

## 2017-06-09 PROCEDURE — 22513 PERQ VERTEBRAL AUGMENTATION: CPT

## 2017-06-09 PROCEDURE — 0PU43JZ SUPPLEMENT THORACIC VERTEBRA WITH SYNTHETIC SUBSTITUTE, PERCUTANEOUS APPROACH: ICD-10-PCS | Performed by: RADIOLOGY

## 2017-06-09 PROCEDURE — 94640 AIRWAY INHALATION TREATMENT: CPT

## 2017-06-09 PROCEDURE — 0PS43ZZ REPOSITION THORACIC VERTEBRA, PERCUTANEOUS APPROACH: ICD-10-PCS | Performed by: RADIOLOGY

## 2017-06-09 PROCEDURE — 77010033678 HC OXYGEN DAILY

## 2017-06-09 PROCEDURE — 74011000250 HC RX REV CODE- 250: Performed by: HOSPITALIST

## 2017-06-09 PROCEDURE — 99152 MOD SED SAME PHYS/QHP 5/>YRS: CPT

## 2017-06-09 PROCEDURE — 74011250636 HC RX REV CODE- 250/636: Performed by: RADIOLOGY

## 2017-06-09 PROCEDURE — 77030034843 HC TAMP SPN BN INFL EXP II KYPH -H

## 2017-06-09 PROCEDURE — 74011250637 HC RX REV CODE- 250/637: Performed by: HOSPITALIST

## 2017-06-09 PROCEDURE — 96365 THER/PROPH/DIAG IV INF INIT: CPT

## 2017-06-09 PROCEDURE — C1713 ANCHOR/SCREW BN/BN,TIS/BN: HCPCS

## 2017-06-09 PROCEDURE — 74011636320 HC RX REV CODE- 636/320: Performed by: RADIOLOGY

## 2017-06-09 PROCEDURE — 77030003666 HC NDL SPINAL BD -A

## 2017-06-09 PROCEDURE — 96375 TX/PRO/DX INJ NEW DRUG ADDON: CPT

## 2017-06-09 PROCEDURE — 74011000250 HC RX REV CODE- 250

## 2017-06-09 PROCEDURE — 74011250636 HC RX REV CODE- 250/636: Performed by: HOSPITALIST

## 2017-06-09 PROCEDURE — 77030034842 HC TAMP SPN BN INFL EXP II KYPH -I

## 2017-06-09 PROCEDURE — 74011000250 HC RX REV CODE- 250: Performed by: RADIOLOGY

## 2017-06-09 PROCEDURE — 96372 THER/PROPH/DIAG INJ SC/IM: CPT

## 2017-06-09 PROCEDURE — 99218 HC RM OBSERVATION: CPT

## 2017-06-09 RX ORDER — LIDOCAINE HYDROCHLORIDE 20 MG/ML
20 INJECTION, SOLUTION INFILTRATION; PERINEURAL
Status: COMPLETED | OUTPATIENT
Start: 2017-06-09 | End: 2017-06-09

## 2017-06-09 RX ORDER — IPRATROPIUM BROMIDE AND ALBUTEROL SULFATE 2.5; .5 MG/3ML; MG/3ML
SOLUTION RESPIRATORY (INHALATION)
Status: COMPLETED
Start: 2017-06-09 | End: 2017-06-09

## 2017-06-09 RX ORDER — DIPHENHYDRAMINE HYDROCHLORIDE 50 MG/ML
12.5 INJECTION, SOLUTION INTRAMUSCULAR; INTRAVENOUS ONCE
Status: COMPLETED | OUTPATIENT
Start: 2017-06-09 | End: 2017-06-09

## 2017-06-09 RX ORDER — SODIUM CHLORIDE 0.9 % (FLUSH) 0.9 %
5-10 SYRINGE (ML) INJECTION EVERY 8 HOURS
Status: DISCONTINUED | OUTPATIENT
Start: 2017-06-09 | End: 2017-06-16 | Stop reason: HOSPADM

## 2017-06-09 RX ORDER — MIDAZOLAM HYDROCHLORIDE 1 MG/ML
5 INJECTION, SOLUTION INTRAMUSCULAR; INTRAVENOUS
Status: DISCONTINUED | OUTPATIENT
Start: 2017-06-09 | End: 2017-06-09 | Stop reason: ALTCHOICE

## 2017-06-09 RX ORDER — SODIUM CHLORIDE 0.9 % (FLUSH) 0.9 %
10 SYRINGE (ML) INJECTION
Status: COMPLETED | OUTPATIENT
Start: 2017-06-09 | End: 2017-06-09

## 2017-06-09 RX ORDER — KETOROLAC TROMETHAMINE 30 MG/ML
15 INJECTION, SOLUTION INTRAMUSCULAR; INTRAVENOUS AS NEEDED
Status: DISCONTINUED | OUTPATIENT
Start: 2017-06-09 | End: 2017-06-09 | Stop reason: ALTCHOICE

## 2017-06-09 RX ORDER — CEFAZOLIN SODIUM IN 0.9 % NACL 2 G/50 ML
2 INTRAVENOUS SOLUTION, PIGGYBACK (ML) INTRAVENOUS ONCE
Status: COMPLETED | OUTPATIENT
Start: 2017-06-09 | End: 2017-06-09

## 2017-06-09 RX ORDER — SODIUM CHLORIDE 0.9 % (FLUSH) 0.9 %
5-10 SYRINGE (ML) INJECTION AS NEEDED
Status: DISCONTINUED | OUTPATIENT
Start: 2017-06-09 | End: 2017-06-16 | Stop reason: HOSPADM

## 2017-06-09 RX ORDER — BUPIVACAINE HYDROCHLORIDE 5 MG/ML
30 INJECTION, SOLUTION EPIDURAL; INTRACAUDAL
Status: COMPLETED | OUTPATIENT
Start: 2017-06-09 | End: 2017-06-09

## 2017-06-09 RX ORDER — FENTANYL CITRATE 50 UG/ML
200 INJECTION, SOLUTION INTRAMUSCULAR; INTRAVENOUS
Status: DISCONTINUED | OUTPATIENT
Start: 2017-06-09 | End: 2017-06-09 | Stop reason: ALTCHOICE

## 2017-06-09 RX ORDER — SODIUM CHLORIDE 9 MG/ML
25 INJECTION, SOLUTION INTRAVENOUS ONCE
Status: COMPLETED | OUTPATIENT
Start: 2017-06-09 | End: 2017-06-09

## 2017-06-09 RX ADMIN — Medication 5 ML: at 22:00

## 2017-06-09 RX ADMIN — FENTANYL CITRATE 25 MCG: 50 INJECTION, SOLUTION INTRAMUSCULAR; INTRAVENOUS at 16:56

## 2017-06-09 RX ADMIN — LIDOCAINE HYDROCHLORIDE 400 MG: 20 INJECTION, SOLUTION INFILTRATION; PERINEURAL at 15:00

## 2017-06-09 RX ADMIN — BUDESONIDE 500 MCG: 0.5 INHALANT RESPIRATORY (INHALATION) at 08:36

## 2017-06-09 RX ADMIN — HEPARIN SODIUM 5000 UNITS: 5000 INJECTION, SOLUTION INTRAVENOUS; SUBCUTANEOUS at 22:04

## 2017-06-09 RX ADMIN — SODIUM BICARBONATE 2 ML: 0.2 INJECTION, SOLUTION INTRAVENOUS at 15:00

## 2017-06-09 RX ADMIN — TRAMADOL HYDROCHLORIDE AND ACETAMINOPHEN 1 TABLET: 37.5; 325 TABLET, FILM COATED ORAL at 07:38

## 2017-06-09 RX ADMIN — IPRATROPIUM BROMIDE AND ALBUTEROL SULFATE 3 ML: .5; 3 SOLUTION RESPIRATORY (INHALATION) at 18:10

## 2017-06-09 RX ADMIN — Medication 10 ML: at 17:58

## 2017-06-09 RX ADMIN — FENTANYL CITRATE 25 MCG: 50 INJECTION, SOLUTION INTRAMUSCULAR; INTRAVENOUS at 17:18

## 2017-06-09 RX ADMIN — MIDAZOLAM HYDROCHLORIDE 1 MG: 1 INJECTION, SOLUTION INTRAMUSCULAR; INTRAVENOUS at 16:54

## 2017-06-09 RX ADMIN — MIDAZOLAM HYDROCHLORIDE 0.5 MG: 1 INJECTION, SOLUTION INTRAMUSCULAR; INTRAVENOUS at 16:43

## 2017-06-09 RX ADMIN — Medication 5 ML: at 06:00

## 2017-06-09 RX ADMIN — BUPIVACAINE HYDROCHLORIDE 150 MG: 5 INJECTION, SOLUTION EPIDURAL; INTRACAUDAL; PERINEURAL at 16:00

## 2017-06-09 RX ADMIN — FENTANYL CITRATE 25 MCG: 50 INJECTION, SOLUTION INTRAMUSCULAR; INTRAVENOUS at 16:42

## 2017-06-09 RX ADMIN — MIDAZOLAM HYDROCHLORIDE 0.5 MG: 1 INJECTION, SOLUTION INTRAMUSCULAR; INTRAVENOUS at 17:18

## 2017-06-09 RX ADMIN — SODIUM CHLORIDE 50 ML/HR: 900 INJECTION, SOLUTION INTRAVENOUS at 07:39

## 2017-06-09 RX ADMIN — PANTOPRAZOLE SODIUM 40 MG: 40 TABLET, DELAYED RELEASE ORAL at 07:38

## 2017-06-09 RX ADMIN — ARFORMOTEROL TARTRATE 15 MCG: 15 SOLUTION RESPIRATORY (INHALATION) at 08:36

## 2017-06-09 RX ADMIN — IOHEXOL 50 ML: 240 INJECTION, SOLUTION INTRATHECAL; INTRAVASCULAR; INTRAVENOUS; ORAL at 16:00

## 2017-06-09 RX ADMIN — MIDAZOLAM HYDROCHLORIDE 0.5 MG: 1 INJECTION, SOLUTION INTRAMUSCULAR; INTRAVENOUS at 17:45

## 2017-06-09 RX ADMIN — KETOROLAC TROMETHAMINE 15 MG: 30 INJECTION, SOLUTION INTRAMUSCULAR at 16:28

## 2017-06-09 RX ADMIN — LEVOTHYROXINE SODIUM 75 MCG: 75 TABLET ORAL at 07:38

## 2017-06-09 RX ADMIN — MIDAZOLAM HYDROCHLORIDE 0.5 MG: 1 INJECTION, SOLUTION INTRAMUSCULAR; INTRAVENOUS at 17:01

## 2017-06-09 RX ADMIN — FENTANYL CITRATE 25 MCG: 50 INJECTION, SOLUTION INTRAMUSCULAR; INTRAVENOUS at 17:02

## 2017-06-09 RX ADMIN — CEFAZOLIN 2 G: 1 INJECTION, POWDER, FOR SOLUTION INTRAMUSCULAR; INTRAVENOUS; PARENTERAL at 15:49

## 2017-06-09 RX ADMIN — DIPHENHYDRAMINE HYDROCHLORIDE 12.5 MG: 50 INJECTION, SOLUTION INTRAMUSCULAR; INTRAVENOUS at 15:48

## 2017-06-09 RX ADMIN — SODIUM CHLORIDE 25 ML/HR: 900 INJECTION, SOLUTION INTRAVENOUS at 16:40

## 2017-06-09 RX ADMIN — NYSTATIN: 100000 CREAM TOPICAL at 08:42

## 2017-06-09 NOTE — H&P
Radiology History and Physical    Patient: Lisa Wick 80 y.o. female       Chief Complaint: Back Pain      History of Present Illness: for t6 t10 kypho    History:    Past Medical History:   Diagnosis Date    Bowel and bladder incontinence 5/3/2017    Compression fracture     COPD (chronic obstructive pulmonary disease) ([facility] [address] 12/3/2015    Depression 7/1/2011    History of anemia 5/3/2017    Rheumatoid arthritis ([facility] 7/1/2011    Unspecified hypothyroidism 7/1/2011     Family History   Problem Relation Age of Onset    Heart Disease Mother     Migraines Father     Lung Disease Son      Social History     Social History    Marital status:      Spouse name: N/A    Number of children: N/A    Years of education: N/A     Occupational History    Not on file. Social History Main Topics    Smoking status: Former Smoker    Smokeless tobacco: Never Used    Alcohol use No    Drug use: No    Sexual activity: No     Other Topics Concern     Service No    Blood Transfusions No    Caffeine Concern No    Occupational Exposure No    Hobby Hazards No    Sleep Concern No    Stress Concern No    Weight Concern No    Special Diet Yes     sodt foods    Back Care Yes     compression fx per son    Exercise No    Bike Helmet No    Seat Belt Yes    Self-Exams No     Social History Narrative       Allergies:    Allergies   Allergen Reactions    Pcn [Penicillins] Unknown (comments)    Zoloft [Sertraline] Unknown (comments)       Current Medications:  Current Facility-Administered Medications   Medication Dose Route Frequency    lidocaine (XYLOCAINE) 20 mg/mL (2 %) injection 400 mg  20 mL SubCUTAneous RAD ONCE    sodium bicarbonate (NEUT) injection 5 mL  5 mL SubCUTAneous RAD ONCE    bupivacaine (PF) (MARCAINE) 0.5 % (5 mg/mL) injection 150 mg  30 mL SubCUTAneous RAD ONCE    iohexol (OMNIPAQUE) solution 50 mL  50 mL Intrathecal RAD ONCE    fentaNYL citrate (PF) injection 200 mcg 200 mcg IntraVENous Multiple    0.9% sodium chloride infusion  25 mL/hr IntraVENous ONCE    midazolam (VERSED) injection 5 mg  5 mg IntraVENous Multiple    sodium chloride (NS) flush 10 mL  10 mL IntraVENous RAD ONCE    ketorolac (TORADOL) injection 15 mg  15 mg IntraVENous PRN    0.9% sodium chloride infusion  50 mL/hr IntraVENous CONTINUOUS    albuterol-ipratropium (DUO-NEB) 2.5 MG-0.5 MG/3 ML  3 mL Nebulization Q6H PRN    FLUoxetine (PROzac) capsule 10 mg  10 mg Oral DAILY    levothyroxine (SYNTHROID) tablet 75 mcg  75 mcg Oral ACB    therapeutic multivitamin (THERAGRAN) tablet 1 Tab  1 Tab Oral DAILY    nystatin (MYCOSTATIN) 100,000 unit/gram cream   Topical BID    sodium chloride (NS) flush 5-10 mL  5-10 mL IntraVENous Q8H    sodium chloride (NS) flush 5-10 mL  5-10 mL IntraVENous PRN    acetaminophen (TYLENOL) tablet 650 mg  650 mg Oral Q4H PRN    ondansetron (ZOFRAN) injection 4 mg  4 mg IntraVENous Q4H PRN    docusate sodium (COLACE) capsule 100 mg  100 mg Oral BID    heparin (porcine) injection 5,000 Units  5,000 Units SubCUTAneous Q8H    traMADol-acetaminophen (ULTRACET) per tablet 1 Tab  1 Tab Oral Q8H    pantoprazole (PROTONIX) tablet 40 mg  40 mg Oral ACB    arformoterol (BROVANA) neb solution 15 mcg  15 mcg Nebulization BID RT    And    budesonide (PULMICORT) 500 mcg/2 ml nebulizer suspension  500 mcg Nebulization BID RT        Physical Exam:  Blood pressure 98/56, pulse 73, temperature 97.8 °F (36.6 °C), resp. rate 16, height 5' 1\" (1.549 m), weight 63.5 kg (140 lb), SpO2 98 %.   LUNG: clear to auscultation bilaterally, HEART: regular rate and rhythm      Alerts:    Hospital Problems  Date Reviewed: 5/3/2017          Codes Class Noted POA    Metabolic encephalopathy MPO-48-XR: G93.41  ICD-9-CM: 348.31  6/6/2017 Yes        Rhabdomyolysis ICD-10-CM: M62.82  ICD-9-CM: 728.88  6/6/2017 Yes        Dehydration ICD-10-CM: E86.0  ICD-9-CM: 276.51  6/6/2017 Yes        * (Principal)Compression fracture of body of thoracic vertebra [facility]) ICD-10-CM: M48.54XA  ICD-9-CM: 733.13  6/6/2017 Yes              Laboratory:      Recent Labs      06/07/17   0333   HGB  10.5*   HCT  34.8*   WBC  8.2   PLT  261   BUN  10   CREA  0.70   K  3.5         Plan of Care/Planned Procedure:  Risks, benefits, and alternatives reviewed with patient and she agrees to proceed with the procedure.        Kristine Menchaca MD

## 2017-06-09 NOTE — PROGRESS NOTES
CM printed successfully processed [facility]. Copy of [facility] faxed to [facility] called APS worker Randall Angelo and left update on her voicemail. CM spoke with pt's son Sherrell Evans and he is agreement for his mother to discharge to [facility] when medically stable. Pt will need AUTH for SNF due to [facility].   Valery Perez RN CRM

## 2017-06-09 NOTE — PROGRESS NOTES
..Bedside and Verbal shift change report given to [address] (oncoming nurse) by Oneil Ladd (offgoing nurse). Report included the following information SBAR.

## 2017-06-09 NOTE — PROGRESS NOTES
Hospitalist Progress Note  Gordy Montez MD  Office: [phone]  Cell: [phone]      Date of Service:  2017  NAME:  Yehuda Rios  :  1929  MRN:  [id number]      Admission Summary:   Yehuda Rios is a 80 y.o. female who presents with PMHx of COPD, Hypothyroidism, Hx of compression fracture, who was just seen at [facility] for back pain and released back home. Pt unable to provide any meaningful history due to confusion and probable underlying Dementia. Per records, ems was called for back pain. On arrival, patient covered with stool B/L LE and buttocks and in nails. Pt unable to recall if she had a fall and how long she was on the floor. Pt states she has back pain which is worse when she lays on the back, no radiation of pain. Pt unable to describe or quantify the pain. Pt lives with her son at home. No reports of fever, chills, syncope, nausea, vomiting, diarrhea, abdominal pain, chest pain, or sob. Interval history / Subjective:   Back pain better, she said she wants to go home,      Assessment & Plan:     Back pain likely related to multiple compression fracture  -s/p Kyphoplast by IR on   -MRI Thoracic on  acute mild/moderate compression fractures at T6 and T10. No evidence of extension into the posterior elements. No significant bulging/retropulsion, chronic compression fractures are noted at T8, T9, and T12, multilevel degenerative changes described above. -MRI Lumbar spine acute vertebral compression deformity along the superior endplate of F54 without cortical retropulsion or epidural hematoma.  mild multilevel disc and facet degenerative change with minimal  anterolisthesis of L5 on S1, mild canal and right foraminal stenosis at L4-5, mild bilateral foraminal stenoses L3-4.  - continue Tylenol and Tramadol for Pain   - PT/OT      Metabolic Encephalopathy superimposed on underlying mild dementia  -CT Head no acute process  - likely related to dehydration  - continue with normal saline       Leukocytosis due to dehydration  -no signs of infection at this time  -resolved      Hypothyroidism  -continue with Synthroid     COPD   -stable  -SaO2 96% on RA  -continue pulmicort,prn duo neb        Code status: Full Code  DVT prophylaxis: heparin    Care Plan discussed with: Patient/Family, Nurse and   Disposition: SNF      Hospital Problems  Date Reviewed: 5/3/2017          Codes Class Noted POA    Metabolic encephalopathy NMO-44-JW: G93.41  ICD-9-CM: 348.31  6/6/2017 Yes        Rhabdomyolysis ICD-10-CM: M62.82  ICD-9-CM: 728.88  6/6/2017 Yes        Dehydration ICD-10-CM: E86.0  ICD-9-CM: 276.51  6/6/2017 Yes        * (Principal)Compression fracture of body of thoracic vertebra ([address] ICD-10-CM: M48.54XA  ICD-9-CM: 733.13  6/6/2017 Yes                Vital Signs:    Last 24hrs VS reviewed since prior progress note. Most recent are:  Visit Vitals    /42 (BP 1 Location: Left arm, BP Patient Position: Reverse Trendelenburg)    Pulse 77    Temp 97.8 °F (36.6 °C)    Resp 13    Ht 5' 1\" (1.549 m)    Wt 63.5 kg (140 lb)    SpO2 100%    BMI 26.45 kg/m2       No intake or output data in the 24 hours ending 06/09/17 5324     Physical Examination:             Constitutional:  No acute distress, cooperative, pleasant    ENT:  Oral mucous moist, oropharynx benign. Neck supple,    Resp:  CTA bilaterally. No wheezing/rhonchi/rales. No accessory muscle use   CV:  Regular rhythm, normal rate, no murmurs, gallops, rubs    GI:  Soft, non distended, non tender.  normoactive bowel sounds, no hepatosplenomegaly     Musculoskeletal:  No edema    Neurologic:  Conscious and alert, answer simple questions and follows simple commands, motor UE 5/5, LE 2/5     Skin:  no [facility]       Data Review:    Review and/or order of clinical lab test      Labs:     Recent Labs      06/07/17   0333   WBC  8.2   HGB  10.5*   HCT  34.8*   PLT  261 Recent Labs      06/07/17   0333   NA  140   K  3.5   CL  106   CO2  27   BUN  10   CREA  0.70   GLU  82   CA  8.7     No results for input(s): SGOT, GPT, ALT, AP, TBIL, TBILI, TP, ALB, GLOB, GGT, AML, LPSE in the last 72 hours. No lab exists for component: AMYP, HLPSE  No results for input(s): INR, PTP, APTT in the last 72 hours. No lab exists for component: INREXT, INREXT   No results for input(s): FE, TIBC, PSAT, FERR in the last 72 hours. Lab Results   Component Value Date/Time    Folate 17.7 06/10/2016 04:33 PM      No results for input(s): PH, PCO2, PO2 in the last 72 hours. No results for input(s): CPK, CKNDX, TROIQ in the last 72 hours.     No lab exists for component: CPKMB  Lab Results   Component Value Date/Time    Cholesterol, total 174 06/10/2016 04:33 PM    HDL Cholesterol 62 06/10/2016 04:33 PM    LDL, calculated 86 06/10/2016 04:33 PM    Triglyceride 128 06/10/2016 04:33 PM     Lab Results   Component Value Date/Time    Glucose (POC) 146 10/10/2014 11:45 AM    Glucose (POC) 176 10/10/2014 07:24 AM    Glucose (POC) 194 10/09/2014 09:17 PM    Glucose (POC) 185 10/09/2014 06:50 PM     Lab Results   Component Value Date/Time    Color DARK YELLOW 06/06/2017 01:05 PM    Appearance CLOUDY 06/06/2017 01:05 PM    Specific gravity 1.030 06/06/2017 01:05 PM    Specific gravity 1.030 05/11/2017 01:59 PM    pH (UA) 6.0 06/06/2017 01:05 PM    Protein 30 06/06/2017 01:05 PM    Glucose NEGATIVE  06/06/2017 01:05 PM    Ketone 40 06/06/2017 01:05 PM    Bilirubin NEGATIVE  10/09/2014 02:17 PM    Urobilinogen 1.0 06/06/2017 01:05 PM    Nitrites NEGATIVE  06/06/2017 01:05 PM    Leukocyte Esterase NEGATIVE  06/06/2017 01:05 PM    Epithelial cells FEW 06/06/2017 01:05 PM    Bacteria NEGATIVE  06/06/2017 01:05 PM    WBC 0-4 06/06/2017 01:05 PM    RBC 0-5 06/06/2017 01:05 PM         Medications Reviewed:     Current Facility-Administered Medications   Medication Dose Route Frequency    sodium chloride (NS) flush 5-10 mL  5-10 mL IntraVENous Q8H    sodium chloride (NS) flush 5-10 mL  5-10 mL IntraVENous PRN    0.9% sodium chloride infusion  50 mL/hr IntraVENous CONTINUOUS    albuterol-ipratropium (DUO-NEB) 2.5 MG-0.5 MG/3 ML  3 mL Nebulization Q6H PRN    FLUoxetine (PROzac) capsule 10 mg  10 mg Oral DAILY    levothyroxine (SYNTHROID) tablet 75 mcg  75 mcg Oral ACB    therapeutic multivitamin (THERAGRAN) tablet 1 Tab  1 Tab Oral DAILY    nystatin (MYCOSTATIN) 100,000 unit/gram cream   Topical BID    sodium chloride (NS) flush 5-10 mL  5-10 mL IntraVENous Q8H    sodium chloride (NS) flush 5-10 mL  5-10 mL IntraVENous PRN    acetaminophen (TYLENOL) tablet 650 mg  650 mg Oral Q4H PRN    ondansetron (ZOFRAN) injection 4 mg  4 mg IntraVENous Q4H PRN    docusate sodium (COLACE) capsule 100 mg  100 mg Oral BID    heparin (porcine) injection 5,000 Units  5,000 Units SubCUTAneous Q8H    traMADol-acetaminophen (ULTRACET) per tablet 1 Tab  1 Tab Oral Q8H    pantoprazole (PROTONIX) tablet 40 mg  40 mg Oral ACB    arformoterol (BROVANA) neb solution 15 mcg  15 mcg Nebulization BID RT    And    budesonide (PULMICORT) 500 mcg/2 ml nebulizer suspension  500 mcg Nebulization BID RT     ______________________________________________________________________  EXPECTED LENGTH OF STAY: 5d 12h  ACTUAL LENGTH OF STAY:          1                 Sol Parker MD

## 2017-06-09 NOTE — PROGRESS NOTES
TRANSFER - IN REPORT:    Verbal report received from [facility] (name) on [facility]  being received from IR (unit) for routine progression of care      Report consisted of patients Situation, Background, Assessment and   Recommendations(SBAR). Information from the following report(s) SBAR was reviewed with the receiving nurse. Opportunity for questions and clarification was provided. Assessment completed upon patients arrival to unit and care assumed.

## 2017-06-09 NOTE — ROUTINE PROCESS
TRANSFER - OUT REPORT:    Verbal report given to Josefina(name) on Deidre Bass  being transferred to [facility](unit) for routine progression of care       Report consisted of patients Situation, Background, Assessment and   Recommendations(SBAR). Information from the following report(s) SBAR, Procedure Summary and MAR was reviewed with the receiving nurse. Lines:   Peripheral IV 06/08/17 Right;Upper (Active)   Site Assessment Clean, dry, & intact 6/9/2017 12:33 AM   Phlebitis Assessment 0 6/9/2017 12:33 AM   Infiltration Assessment 0 6/9/2017 12:33 AM   Dressing Status Clean, dry, & intact 6/9/2017 12:33 AM   Dressing Type Transparent 6/9/2017 12:33 AM   Hub Color/Line Status Blue; Infusing 6/9/2017 12:33 AM   Alcohol Cap Used Yes 6/9/2017 12:33 AM        Opportunity for questions and clarification was provided.       Patient transported with:   Anipipo

## 2017-06-09 NOTE — CONSULTS
PALLIATIVE MEDICINE            Consult noted and appreciated. Pt off the floor at this time. Will follow up on Monday 6/12/17. Thank you. Marbella Market.  [address] MSN, FNP-BC, [facility]

## 2017-06-09 NOTE — PROGRESS NOTES
PHYSICAL THERAPY: 1350    Chart reviewed, nursing notified. Patient remains difficult to arouse when asleep. Once awake, patient declined PT services for bed exercises, EOB, and OOB activities. Patient replied, \"there's nothing wrong with me, they just need to call my son and have him take me home. I just want to go home. \" Nursing notified of patient's defer. Will attempt to follow-up with patient as able.      Thank you,     Juan F Isaac, PT, DPT

## 2017-06-09 NOTE — PROGRESS NOTES
Pt. Noted to be wheezing at the end of the kyphoplasty. Yohana noted prn on MAR. Respiratory therapy called to assist with nebulizer treatment while in angio holding.

## 2017-06-10 LAB
ERYTHROCYTE [DISTWIDTH] IN BLOOD BY AUTOMATED COUNT: 16.7 % (ref 11.5–14.5)
HCT VFR BLD AUTO: 35.1 % (ref 35–47)
HGB BLD-MCNC: 10.7 G/DL (ref 11.5–16)
MCH RBC QN AUTO: 27 PG (ref 26–34)
MCHC RBC AUTO-ENTMCNC: 30.5 G/DL (ref 30–36.5)
MCV RBC AUTO: 88.4 FL (ref 80–99)
PLATELET # BLD AUTO: 211 K/UL (ref 150–400)
RBC # BLD AUTO: 3.97 M/UL (ref 3.8–5.2)
WBC # BLD AUTO: 6.3 K/UL (ref 3.6–11)

## 2017-06-10 PROCEDURE — 74011250636 HC RX REV CODE- 250/636: Performed by: HOSPITALIST

## 2017-06-10 PROCEDURE — 36415 COLL VENOUS BLD VENIPUNCTURE: CPT | Performed by: HOSPITALIST

## 2017-06-10 PROCEDURE — 77010033678 HC OXYGEN DAILY

## 2017-06-10 PROCEDURE — 94640 AIRWAY INHALATION TREATMENT: CPT

## 2017-06-10 PROCEDURE — 85027 COMPLETE CBC AUTOMATED: CPT | Performed by: HOSPITALIST

## 2017-06-10 PROCEDURE — 99218 HC RM OBSERVATION: CPT

## 2017-06-10 PROCEDURE — 96372 THER/PROPH/DIAG INJ SC/IM: CPT

## 2017-06-10 PROCEDURE — 74011250637 HC RX REV CODE- 250/637: Performed by: HOSPITALIST

## 2017-06-10 PROCEDURE — 74011000250 HC RX REV CODE- 250: Performed by: HOSPITALIST

## 2017-06-10 RX ADMIN — ARFORMOTEROL TARTRATE 15 MCG: 15 SOLUTION RESPIRATORY (INHALATION) at 08:12

## 2017-06-10 RX ADMIN — LEVOTHYROXINE SODIUM 75 MCG: 75 TABLET ORAL at 06:45

## 2017-06-10 RX ADMIN — TRAMADOL HYDROCHLORIDE AND ACETAMINOPHEN 1 TABLET: 37.5; 325 TABLET, FILM COATED ORAL at 14:44

## 2017-06-10 RX ADMIN — THERA TABS 1 TABLET: TAB at 09:13

## 2017-06-10 RX ADMIN — ONDANSETRON 4 MG: 2 INJECTION INTRAMUSCULAR; INTRAVENOUS at 19:29

## 2017-06-10 RX ADMIN — ARFORMOTEROL TARTRATE 15 MCG: 15 SOLUTION RESPIRATORY (INHALATION) at 20:38

## 2017-06-10 RX ADMIN — ACETAMINOPHEN 650 MG: 325 TABLET, FILM COATED ORAL at 09:12

## 2017-06-10 RX ADMIN — BUDESONIDE 500 MCG: 0.5 INHALANT RESPIRATORY (INHALATION) at 08:11

## 2017-06-10 RX ADMIN — SODIUM CHLORIDE 50 ML/HR: 900 INJECTION, SOLUTION INTRAVENOUS at 11:47

## 2017-06-10 RX ADMIN — HEPARIN SODIUM 5000 UNITS: 5000 INJECTION, SOLUTION INTRAVENOUS; SUBCUTANEOUS at 14:44

## 2017-06-10 RX ADMIN — BUDESONIDE 500 MCG: 0.5 INHALANT RESPIRATORY (INHALATION) at 20:38

## 2017-06-10 RX ADMIN — PANTOPRAZOLE SODIUM 40 MG: 40 TABLET, DELAYED RELEASE ORAL at 06:45

## 2017-06-10 RX ADMIN — NYSTATIN: 100000 CREAM TOPICAL at 11:47

## 2017-06-10 RX ADMIN — HEPARIN SODIUM 5000 UNITS: 5000 INJECTION, SOLUTION INTRAVENOUS; SUBCUTANEOUS at 22:10

## 2017-06-10 RX ADMIN — NYSTATIN: 100000 CREAM TOPICAL at 19:10

## 2017-06-10 RX ADMIN — FLUOXETINE 10 MG: 10 CAPSULE ORAL at 09:13

## 2017-06-10 RX ADMIN — HEPARIN SODIUM 5000 UNITS: 5000 INJECTION, SOLUTION INTRAVENOUS; SUBCUTANEOUS at 06:44

## 2017-06-10 RX ADMIN — TRAMADOL HYDROCHLORIDE AND ACETAMINOPHEN 1 TABLET: 37.5; 325 TABLET, FILM COATED ORAL at 06:45

## 2017-06-10 RX ADMIN — DOCUSATE SODIUM 100 MG: 100 CAPSULE, LIQUID FILLED ORAL at 09:13

## 2017-06-10 NOTE — ROUTINE PROCESS
Bedside shift change report given to Kyle scanlon RN (oncoming nurse) by Tian Aguilar RN(offgoing nurse). Report given with SBAR.

## 2017-06-10 NOTE — PROGRESS NOTES
Physical Therapy    Reviewed pt chart, RN cleared pt for PT. Pt received supine in bed, pt deferred EOB activity w/ PT at this time. Will follow up at later time as able and appropriate.     Julissa Means, PTA

## 2017-06-11 PROBLEM — M54.9 BACK PAIN: Status: ACTIVE | Noted: 2017-06-11

## 2017-06-11 PROCEDURE — 74011250637 HC RX REV CODE- 250/637: Performed by: HOSPITALIST

## 2017-06-11 PROCEDURE — 65270000029 HC RM PRIVATE

## 2017-06-11 PROCEDURE — 99218 HC RM OBSERVATION: CPT

## 2017-06-11 PROCEDURE — 74011250636 HC RX REV CODE- 250/636: Performed by: HOSPITALIST

## 2017-06-11 PROCEDURE — 94640 AIRWAY INHALATION TREATMENT: CPT

## 2017-06-11 PROCEDURE — 74011000250 HC RX REV CODE- 250: Performed by: HOSPITALIST

## 2017-06-11 RX ORDER — POLYETHYLENE GLYCOL 3350 17 G/17G
17 POWDER, FOR SOLUTION ORAL 2 TIMES DAILY
Status: DISCONTINUED | OUTPATIENT
Start: 2017-06-11 | End: 2017-06-16 | Stop reason: HOSPADM

## 2017-06-11 RX ORDER — DOCUSATE SODIUM 100 MG/1
200 CAPSULE, LIQUID FILLED ORAL 2 TIMES DAILY
Status: DISCONTINUED | OUTPATIENT
Start: 2017-06-11 | End: 2017-06-16 | Stop reason: HOSPADM

## 2017-06-11 RX ADMIN — HEPARIN SODIUM 5000 UNITS: 5000 INJECTION, SOLUTION INTRAVENOUS; SUBCUTANEOUS at 07:09

## 2017-06-11 RX ADMIN — TRAMADOL HYDROCHLORIDE AND ACETAMINOPHEN 1 TABLET: 37.5; 325 TABLET, FILM COATED ORAL at 15:46

## 2017-06-11 RX ADMIN — PANTOPRAZOLE SODIUM 40 MG: 40 TABLET, DELAYED RELEASE ORAL at 07:09

## 2017-06-11 RX ADMIN — HEPARIN SODIUM 5000 UNITS: 5000 INJECTION, SOLUTION INTRAVENOUS; SUBCUTANEOUS at 21:14

## 2017-06-11 RX ADMIN — ARFORMOTEROL TARTRATE 15 MCG: 15 SOLUTION RESPIRATORY (INHALATION) at 12:22

## 2017-06-11 RX ADMIN — TRAMADOL HYDROCHLORIDE AND ACETAMINOPHEN 1 TABLET: 37.5; 325 TABLET, FILM COATED ORAL at 07:09

## 2017-06-11 RX ADMIN — NYSTATIN: 100000 CREAM TOPICAL at 18:00

## 2017-06-11 RX ADMIN — HEPARIN SODIUM 5000 UNITS: 5000 INJECTION, SOLUTION INTRAVENOUS; SUBCUTANEOUS at 15:46

## 2017-06-11 RX ADMIN — DOCUSATE SODIUM 200 MG: 100 CAPSULE, LIQUID FILLED ORAL at 19:23

## 2017-06-11 RX ADMIN — ACETAMINOPHEN 650 MG: 325 TABLET, FILM COATED ORAL at 09:53

## 2017-06-11 RX ADMIN — NYSTATIN: 100000 CREAM TOPICAL at 12:50

## 2017-06-11 RX ADMIN — ARFORMOTEROL TARTRATE 15 MCG: 15 SOLUTION RESPIRATORY (INHALATION) at 19:56

## 2017-06-11 RX ADMIN — NYSTATIN: 100000 CREAM TOPICAL at 15:46

## 2017-06-11 RX ADMIN — BUDESONIDE 500 MCG: 0.5 INHALANT RESPIRATORY (INHALATION) at 12:22

## 2017-06-11 RX ADMIN — THERA TABS 1 TABLET: TAB at 09:53

## 2017-06-11 RX ADMIN — LEVOTHYROXINE SODIUM 75 MCG: 75 TABLET ORAL at 07:09

## 2017-06-11 RX ADMIN — FLUOXETINE 10 MG: 10 CAPSULE ORAL at 09:53

## 2017-06-11 RX ADMIN — POLYETHYLENE GLYCOL 3350 17 G: 17 POWDER, FOR SOLUTION ORAL at 12:50

## 2017-06-11 RX ADMIN — SODIUM CHLORIDE 50 ML/HR: 900 INJECTION, SOLUTION INTRAVENOUS at 21:14

## 2017-06-11 RX ADMIN — POLYETHYLENE GLYCOL 3350 17 G: 17 POWDER, FOR SOLUTION ORAL at 19:23

## 2017-06-11 RX ADMIN — BUDESONIDE 500 MCG: 0.5 INHALANT RESPIRATORY (INHALATION) at 19:56

## 2017-06-11 RX ADMIN — TRAMADOL HYDROCHLORIDE AND ACETAMINOPHEN 1 TABLET: 37.5; 325 TABLET, FILM COATED ORAL at 21:15

## 2017-06-11 NOTE — PROGRESS NOTES
Hospitalist Progress Note  Shaggy Danielson MD  Office: [phone]  Cell: [phone]      Date of Service:  2017  NAME:  Corrie Quintero  :  1929  MRN:  [id number]      Admission Summary:   Corrie Quintero is a 80 y.o. female who presents with PMHx of COPD, Hypothyroidism, Hx of compression fracture, who was just seen at [facility] for back pain and released back home. Pt unable to provide any meaningful history due to confusion and probable underlying Dementia. Per records, ems was called for back pain. On arrival, patient covered with stool B/L LE and buttocks and in nails. Pt unable to recall if she had a fall and how long she was on the floor. Pt states she has back pain which is worse when she lays on the back, no radiation of pain. Pt unable to describe or quantify the pain. Pt lives with her son at home. No reports of fever, chills, syncope, nausea, vomiting, diarrhea, abdominal pain, chest pain, or sob. Interval history / Subjective:   Back pain better, she said she wants to go home,      Assessment & Plan:     Back pain likely related to multiple compression fracture  -s/p Kyphoplast T6 and T10 by IR on   -MRI Thoracic on  acute mild/moderate compression fractures at T6 and T10. No evidence of extension into the posterior elements. No significant bulging/retropulsion, chronic compression fractures are noted at T8, T9, and T12, multilevel degenerative changes described above. -MRI Lumbar spine acute vertebral compression deformity along the superior endplate of Q59 without cortical retropulsion or epidural hematoma.  mild multilevel disc and facet degenerative change with minimal  anterolisthesis of L5 on S1, mild canal and right foraminal stenosis at L4-5, mild bilateral foraminal stenoses L3-4.  - continue Tylenol and Tramadol for Pain   - PT/OT      Metabolic Encephalopathy superimposed on underlying mild dementia  -CT Head no acute process  - likely related to dehydration, conscious and alert, confused  - continue with normal saline       Leukocytosis due to dehydration  -no signs of infection at this time  -resolved      Hypothyroidism  -continue with Synthroid     COPD   -stable  -SaO2 96% on RA  -continue pulmicort,prn duo neb        Code status: Full Code  DVT prophylaxis: heparin    Care Plan discussed with: Patient/Family, Nurse and   Disposition: SNF possible on 6/12  Son at bedside, questions answered on 6/10     Hospital Problems  Date Reviewed: 5/3/2017          Codes Class Noted POA    Back pain ICD-10-CM: M54.9  ICD-9-CM: 724.5  6/11/2017 Unknown        Metabolic encephalopathy CKH-72-IK: G93.41  ICD-9-CM: 348.31  6/6/2017 Yes        Rhabdomyolysis ICD-10-CM: M62.82  ICD-9-CM: 728.88  6/6/2017 Yes        Dehydration ICD-10-CM: E86.0  ICD-9-CM: 276.51  6/6/2017 Yes        * (Principal)Compression fracture of body of thoracic vertebra ([address] ICD-10-CM: M48.54XA  ICD-9-CM: 733.13  6/6/2017 Yes                Vital Signs:    Last 24hrs VS reviewed since prior progress note. Most recent are:  Visit Vitals    BP 99/56 (BP 1 Location: Left arm, BP Patient Position: Lying right side)    Pulse 89    Temp 98 °F (36.7 °C)    Resp 14    Ht 5' 1\" (1.549 m)    Wt 77.4 kg (170 lb 10.2 oz)    SpO2 98%    BMI 32.24 kg/m2         Intake/Output Summary (Last 24 hours) at 06/11/17 1034  Last data filed at 06/10/17 1908   Gross per 24 hour   Intake              550 ml   Output                0 ml   Net              550 ml        Physical Examination:             Constitutional:  No acute distress, cooperative, pleasant    ENT:  Oral mucous moist, oropharynx benign. Neck supple,    Resp:  CTA bilaterally. No wheezing/rhonchi/rales. No accessory muscle use   CV:  Regular rhythm, normal rate, no murmurs, gallops, rubs    GI:  Soft, non distended, non tender.  normoactive bowel sounds, no hepatosplenomegaly Musculoskeletal:  No edema    Neurologic:  Conscious and alert, answer simple questions and follows simple commands, motor UE 5/5, LE 2/5     Skin:  no [facility]       Data Review:    Review and/or order of clinical lab test      Labs:     Recent Labs      06/10/17   0159   WBC  6.3   HGB  10.7*   HCT  35.1   PLT  211     No results for input(s): NA, K, CL, CO2, BUN, CREA, GLU, CA, MG, PHOS, URICA in the last 72 hours. No results for input(s): SGOT, GPT, ALT, AP, TBIL, TBILI, TP, ALB, GLOB, GGT, AML, LPSE in the last 72 hours. No lab exists for component: AMYP, HLPSE  No results for input(s): INR, PTP, APTT in the last 72 hours. No lab exists for component: INREXT, INREXT   No results for input(s): FE, TIBC, PSAT, FERR in the last 72 hours. Lab Results   Component Value Date/Time    Folate 17.7 06/10/2016 04:33 PM      No results for input(s): PH, PCO2, PO2 in the last 72 hours. No results for input(s): CPK, CKNDX, TROIQ in the last 72 hours.     No lab exists for component: CPKMB  Lab Results   Component Value Date/Time    Cholesterol, total 174 06/10/2016 04:33 PM    HDL Cholesterol 62 06/10/2016 04:33 PM    LDL, calculated 86 06/10/2016 04:33 PM    Triglyceride 128 06/10/2016 04:33 PM     Lab Results   Component Value Date/Time    Glucose (POC) 146 10/10/2014 11:45 AM    Glucose (POC) 176 10/10/2014 07:24 AM    Glucose (POC) 194 10/09/2014 09:17 PM    Glucose (POC) 185 10/09/2014 06:50 PM     Lab Results   Component Value Date/Time    Color DARK YELLOW 06/06/2017 01:05 PM    Appearance CLOUDY 06/06/2017 01:05 PM    Specific gravity 1.030 06/06/2017 01:05 PM    Specific gravity 1.030 05/11/2017 01:59 PM    pH (UA) 6.0 06/06/2017 01:05 PM    Protein 30 06/06/2017 01:05 PM    Glucose NEGATIVE  06/06/2017 01:05 PM    Ketone 40 06/06/2017 01:05 PM    Bilirubin NEGATIVE  10/09/2014 02:17 PM    Urobilinogen 1.0 06/06/2017 01:05 PM    Nitrites NEGATIVE  06/06/2017 01:05 PM    Leukocyte Esterase NEGATIVE  06/06/2017 01:05 PM    Epithelial cells FEW 06/06/2017 01:05 PM    Bacteria NEGATIVE  06/06/2017 01:05 PM    WBC 0-4 06/06/2017 01:05 PM    RBC 0-5 06/06/2017 01:05 PM         Medications Reviewed:     Current Facility-Administered Medications   Medication Dose Route Frequency    docusate sodium (COLACE) capsule 200 mg  200 mg Oral BID    polyethylene glycol (MIRALAX) packet 17 g  17 g Oral BID    sodium chloride (NS) flush 5-10 mL  5-10 mL IntraVENous Q8H    sodium chloride (NS) flush 5-10 mL  5-10 mL IntraVENous PRN    0.9% sodium chloride infusion  50 mL/hr IntraVENous CONTINUOUS    albuterol-ipratropium (DUO-NEB) 2.5 MG-0.5 MG/3 ML  3 mL Nebulization Q6H PRN    FLUoxetine (PROzac) capsule 10 mg  10 mg Oral DAILY    levothyroxine (SYNTHROID) tablet 75 mcg  75 mcg Oral ACB    therapeutic multivitamin (THERAGRAN) tablet 1 Tab  1 Tab Oral DAILY    nystatin (MYCOSTATIN) 100,000 unit/gram cream   Topical BID    sodium chloride (NS) flush 5-10 mL  5-10 mL IntraVENous Q8H    sodium chloride (NS) flush 5-10 mL  5-10 mL IntraVENous PRN    acetaminophen (TYLENOL) tablet 650 mg  650 mg Oral Q4H PRN    ondansetron (ZOFRAN) injection 4 mg  4 mg IntraVENous Q4H PRN    heparin (porcine) injection 5,000 Units  5,000 Units SubCUTAneous Q8H    traMADol-acetaminophen (ULTRACET) per tablet 1 Tab  1 Tab Oral Q8H    pantoprazole (PROTONIX) tablet 40 mg  40 mg Oral ACB    arformoterol (BROVANA) neb solution 15 mcg  15 mcg Nebulization BID RT    And    budesonide (PULMICORT) 500 mcg/2 ml nebulizer suspension  500 mcg Nebulization BID RT     ______________________________________________________________________  EXPECTED LENGTH OF STAY: 5d 12h  ACTUAL LENGTH OF STAY:          1                 Jemma Thompson MD

## 2017-06-11 NOTE — PROGRESS NOTES
Bedside and Verbal shift change report given to Erin Silvestre RN (oncoming nurse) by Bibiana Gabriel RN (offgoing nurse). Report included the following information SBAR, Kardex and MAR.

## 2017-06-11 NOTE — ROUTINE PROCESS
Bedside shift change report given to Kyle scanlon RN (oncoming nurse) by Melanie Nichols RN(offgoing nurse). Report given with SBAR.

## 2017-06-11 NOTE — PROGRESS NOTES
Bedside and Verbal shift change report given to José Antonio RN (oncoming nurse) by Mariely River RN (offgoing nurse). Report included the following information SBAR, Kardex and MAR.

## 2017-06-12 PROCEDURE — 77010033678 HC OXYGEN DAILY

## 2017-06-12 PROCEDURE — 94640 AIRWAY INHALATION TREATMENT: CPT

## 2017-06-12 PROCEDURE — 74011250636 HC RX REV CODE- 250/636: Performed by: HOSPITALIST

## 2017-06-12 PROCEDURE — 76450000000

## 2017-06-12 PROCEDURE — 74011250637 HC RX REV CODE- 250/637: Performed by: HOSPITALIST

## 2017-06-12 PROCEDURE — 74011000250 HC RX REV CODE- 250: Performed by: HOSPITALIST

## 2017-06-12 PROCEDURE — 97530 THERAPEUTIC ACTIVITIES: CPT

## 2017-06-12 PROCEDURE — 65270000029 HC RM PRIVATE

## 2017-06-12 RX ORDER — DOCUSATE SODIUM 100 MG/1
200 CAPSULE, LIQUID FILLED ORAL 2 TIMES DAILY
Qty: 60 CAP | Refills: 0 | Status: SHIPPED | OUTPATIENT
Start: 2017-06-12 | End: 2017-09-10

## 2017-06-12 RX ORDER — POLYETHYLENE GLYCOL 3350 17 G/17G
17 POWDER, FOR SOLUTION ORAL
Qty: 30 EACH | Refills: 0 | Status: SHIPPED
Start: 2017-06-12

## 2017-06-12 RX ORDER — TRAMADOL HYDROCHLORIDE AND ACETAMINOPHEN 37.5; 325 MG/1; MG/1
1 TABLET ORAL EVERY 8 HOURS
Qty: 10 TAB | Refills: 0 | Status: SHIPPED | OUTPATIENT
Start: 2017-06-12

## 2017-06-12 RX ADMIN — FLUOXETINE 10 MG: 10 CAPSULE ORAL at 08:47

## 2017-06-12 RX ADMIN — POLYETHYLENE GLYCOL 3350 17 G: 17 POWDER, FOR SOLUTION ORAL at 08:47

## 2017-06-12 RX ADMIN — DOCUSATE SODIUM 200 MG: 100 CAPSULE, LIQUID FILLED ORAL at 17:07

## 2017-06-12 RX ADMIN — TRAMADOL HYDROCHLORIDE AND ACETAMINOPHEN 1 TABLET: 37.5; 325 TABLET, FILM COATED ORAL at 14:28

## 2017-06-12 RX ADMIN — PANTOPRAZOLE SODIUM 40 MG: 40 TABLET, DELAYED RELEASE ORAL at 06:30

## 2017-06-12 RX ADMIN — BUDESONIDE 500 MCG: 0.5 INHALANT RESPIRATORY (INHALATION) at 21:38

## 2017-06-12 RX ADMIN — TRAMADOL HYDROCHLORIDE AND ACETAMINOPHEN 1 TABLET: 37.5; 325 TABLET, FILM COATED ORAL at 06:30

## 2017-06-12 RX ADMIN — POLYETHYLENE GLYCOL 3350 17 G: 17 POWDER, FOR SOLUTION ORAL at 17:07

## 2017-06-12 RX ADMIN — HEPARIN SODIUM 5000 UNITS: 5000 INJECTION, SOLUTION INTRAVENOUS; SUBCUTANEOUS at 14:29

## 2017-06-12 RX ADMIN — HEPARIN SODIUM 5000 UNITS: 5000 INJECTION, SOLUTION INTRAVENOUS; SUBCUTANEOUS at 06:29

## 2017-06-12 RX ADMIN — THERA TABS 1 TABLET: TAB at 08:47

## 2017-06-12 RX ADMIN — NYSTATIN: 100000 CREAM TOPICAL at 08:47

## 2017-06-12 RX ADMIN — ARFORMOTEROL TARTRATE 15 MCG: 15 SOLUTION RESPIRATORY (INHALATION) at 21:38

## 2017-06-12 RX ADMIN — HEPARIN SODIUM 5000 UNITS: 5000 INJECTION, SOLUTION INTRAVENOUS; SUBCUTANEOUS at 21:56

## 2017-06-12 RX ADMIN — LEVOTHYROXINE SODIUM 75 MCG: 75 TABLET ORAL at 06:30

## 2017-06-12 RX ADMIN — DOCUSATE SODIUM 200 MG: 100 CAPSULE, LIQUID FILLED ORAL at 08:47

## 2017-06-12 RX ADMIN — NYSTATIN: 100000 CREAM TOPICAL at 17:07

## 2017-06-12 NOTE — PROGRESS NOTES
Bedside and Verbal shift change report given to [facility]  (oncoming nurse) by Jaye Isabel  (offgoing nurse). Report included the following information SBAR and Kardex.

## 2017-06-12 NOTE — PROGRESS NOTES
CRM followed up with [facility]. via [address]. Waiting on PT and OT notes from today for insurance authorization. ADAMT    PT noted, waiting on OT. ARTI    Therapy notes are in. MARGOT spoke with [place] at [facility]. She will start the insurance auth.  ADAMT

## 2017-06-12 NOTE — CDMP QUERY
Patient is noted to have a BMI of  32.24. Please clarify if this patient is:     =>Morbidly obese  =>Obese   =>Overweight  =>Other explanation of clinical findings  =>Unable to determine (no explanation for clinical findings)    Presentation: 5'1\",   170 lbs = BMI  32.24    REFERENCE:  The [address] [place] [address] has issued a statement indicating that, \"Individuals who are overweight, obese, or morbidly obese are at an increased risk for certain medical conditions when compared to persons of normal weight. Therefore, these conditions are always clinically significant and reportable when documented by the provider. Please clarify and document your clinical opinion in the progress notes and discharge summary including the definitive and/or presumptive diagnosis, (suspected or probable), related to the above clinical findings. Please include clinical findings supporting your diagnosis.     Thank you,  Rick Dominguez RN BSARUN [place] [address]  # [phone]

## 2017-06-12 NOTE — CONSULTS
Palliative Medicine Consult  Reji: [phone] ([phone])    Patient Name: Cr Mcgrath  YOB: 1929    Date of Initial Consult: 6/12/17  Reason for Consult: Care decisions   Requesting Provider: Mary Jo Alfaro    Primary Care Physician: Uriah Santos NP      SUMMARY:   Cr Mcgrath is a 80 y.o. with a past history of COPD, hypothyroidism, hx compression fx who was admitted on 6/6/2017 from home via EMS with back pain and confusion, recently seen at [facility] for similar complaints. Upon admission pt quite disheveled. With multilevel degenerative changes in lower spine, with acute compression fx at T6, T10 s/p kyphoplasty by IR on 6/9. CT head w/out acute process, mild underlying dementia. Current medical issues leading to Palliative Medicine involvement include: care decisions. PALLIATIVE DIAGNOSES:   1. Confusion- mild dementia  2. Debility   3. Back pain due to thoracic compression fractures       PLAN   1. Meet w/ pt who is able to answer questions, denies current pain, and give some hx but overall quite confused about situation. Does not have capcity. 2. Pain: Doing well w/ scheduled Tramadol/Tyelnol every 8h, but has not yet worked getting OOB. 3. Bowel regimen. 4. Goals: Pt tells me she lives w/ son Reid Seaman. I have reviewed AMD which names her son Gino Brito as primary and another relative Clemente Blount as secondary. She has 7 children. Unable to participate in code discussion w/ me.   5. Speak to son Gino Brito, who is mPOA and financial POA. Pt was actually followed in our clinic at the end of 2016 but then no longer met criteria. Before the AMD was found (done in 2008) note that there were and still are quite complex family dynamics. LCSW at our office and PCP office did a lot of investigating.    Queta Alan 15 not as involved as he used to be, as pt wanted to stay at home w/ her son Kemal  and have him care for her- however at this time Kemal Staff seems unable to do that based on pt's condition when she came in (feces on pt, uncertain time on floor). Give medical update and that pt may decline functionally with time, even if placed in NH - which is the current plan after SNF. Avery Wadsworth has submitted Medicaid application. 7. Go over code status with him and in past, pt was actually DNR- states that he signed a DDNR form in our office when pt was followed by us. Does not meet criteria for our clinic and was discharged last year. Do not have this document- in our clinic or scanned, so perhaps was not done. But Avery Wadsworth agrees that DNR status protects pt from harsh things that have very small chance of giving pt a meaningful recovery. 8. Pt is DNR, DDNR left it pt's room to sign. 9. Initial consult note routed to primary continuity provider. 10. Communicated plan of care with: Palliative IDT; Dr Heather Marinelli ;  Palliative LCSW Flip Patel       GOALS OF CARE / TREATMENT PREFERENCES:   [====Goals of Care====]  GOALS OF CARE:  Patient / health care proxy stated goals: recovery as possible, pt wishes to go home soon      TREATMENT PREFERENCES:   Code Status: Full Code    Advance Care Planning:  Advance Care Planning 6/12/2017   Patient's Healthcare Decision Maker is: Named in scanned ACP document   Primary Decision Maker Name Shelbie Cooley   Primary Decision Maker Phone Number [phone]   Primary Decision Maker Relationship to Patient -   Secondary Decision Maker Name -   Secondary Decision Maker Relationship to Patient -   Confirm Advance Directive Yes, on file       Other:    The palliative care team has discussed with patient / health care proxy about goals of care / treatment preferences for patient.  [====Goals of Care====]         HISTORY:     History obtained from: Pt, chart, staff    CHIEF COMPLAINT: I want to go home    HPI/SUBJECTIVE:    The patient is:   [x] Verbal and participatory  [] Non-participatory due to:     Pt denies any pain, following commands and answering some questions- but does not know year, place or situation and tells me \"I don't care to know. \" Wants to go home soon. Has not been out of bed. Clinical Pain Assessment (nonverbal scale for severity on nonverbal patients):   [++++ Clinical Pain Assessment++++]  [++++Pain Severity++++]: Pain: 0  [++++Pain Character++++]:   [++++Pain Duration++++]:   [++++Pain Effect++++]:   [++++Pain Factors++++]:   [++++Pain Frequency++++]:   [++++Pain Location++++]:   [++++ Clinical Pain Assessment++++]     FUNCTIONAL ASSESSMENT:     Palliative Performance Scale (PPS):  PPS: 40       PSYCHOSOCIAL/SPIRITUAL SCREENING:     Advance Care Planning:  Advance Care Planning 6/12/2017   Patient's Healthcare Decision Maker is: Named in scanned ACP document   Primary Decision Maker Name Fausto Wallace   Primary Decision Maker Phone Number [phone]   Primary Decision Maker Relationship to Patient -   Secondary Decision Maker Name -   Secondary Decision Maker Relationship to Patient -   Confirm Advance Directive Yes, on file        Any spiritual / Pentec[employer]alism concerns:  [] Yes /  [x] No    Caregiver Burnout:  [] Yes /  [] No /  [x] No Caregiver Present      Anticipatory grief assessment:   [x] Normal  / [] Maladaptive       ESAS Anxiety:  Pt not answering   ESAS Depression:   Pt not answering        REVIEW OF SYSTEMS:     Positive and pertinent negative findings in ROS are noted above in HPI. The following systems were [x] reviewed but limited  / [] unable to be reviewed as noted in HPI  Other findings are noted below. Systems: constitutional, ears/nose/mouth/throat, respiratory, gastrointestinal, genitourinary, musculoskeletal, integumentary, neurologic, psychiatric, endocrine. Positive findings noted below.   Modified ESAS Completed by: provider   Fatigue: 8 Drowsiness: 2     Pain: 0         Anorexia: 0 Dyspnea: 0     Constipation: No     Stool Occurrence(s): 1        PHYSICAL EXAM:     From RN flowsheet:  Wt Readings from Last 3 Encounters:   06/10/17 170 lb 10.2 oz (77.4 kg) 06/07/17 140 lb (63.5 kg)   06/07/17 140 lb (63.5 kg)     Blood pressure 125/42, pulse 84, temperature 98.5 °F (36.9 °C), resp. rate 14, height 5' 1\" (1.549 m), weight 170 lb 10.2 oz (77.4 kg), SpO2 97 %. Pain Scale 1: Numeric (0 - 10)  Pain Intensity 1: 0  Pain Onset 1: acute  Pain Location 1: Head     Pain Description 1: Aching  Pain Intervention(s) 1: Medication (see MAR)      Constitutional: awake, alert to person only- does not \"care\" about my other orientation questions   Eyes: pupils equal, anicteric  ENMT: no nasal discharge, moist mucous membranes  Cardiovascular: regular rhythm  Respiratory: breathing not labored, symmetric  Gastrointestinal: soft non-tender, +bowel sounds  Musculoskeletal: no deformity, no tenderness to palpation  Skin: warm, dry  Neurologic: following commands, moving all extremities against gravity   Psychiatric: full affect, no hallucinations       HISTORY:     Principal Problem:    Compression fracture of body of thoracic vertebra (HCC) (6/6/2017)    Active Problems:    Metabolic encephalopathy (0/7/9969)      Rhabdomyolysis (6/6/2017)      Dehydration (6/6/2017)      Back pain (6/11/2017)      Past Medical History:   Diagnosis Date    Bowel and bladder incontinence 5/3/2017    Compression fracture     COPD (chronic obstructive pulmonary disease) ([facility] 12/3/2015    Depression 7/1/2011    History of anemia 5/3/2017    Rheumatoid arthritis ([facility] 7/1/2011    Unspecified hypothyroidism 7/1/2011      History reviewed. No pertinent surgical history. Family History   Problem Relation Age of Onset    Heart Disease Mother     Migraines Father     Lung Disease Son       History reviewed, no pertinent family history.   Social History   Substance Use Topics    Smoking status: Former Smoker    Smokeless tobacco: Never Used    Alcohol use No     Allergies   Allergen Reactions    Pcn [Penicillins] Unknown (comments)    Zoloft [Sertraline] Unknown (comments)      Current Facility-Administered Medications   Medication Dose Route Frequency    docusate sodium (COLACE) capsule 200 mg  200 mg Oral BID    polyethylene glycol (MIRALAX) packet 17 g  17 g Oral BID    sodium chloride (NS) flush 5-10 mL  5-10 mL IntraVENous Q8H    sodium chloride (NS) flush 5-10 mL  5-10 mL IntraVENous PRN    0.9% sodium chloride infusion  50 mL/hr IntraVENous CONTINUOUS    albuterol-ipratropium (DUO-NEB) 2.5 MG-0.5 MG/3 ML  3 mL Nebulization Q6H PRN    FLUoxetine (PROzac) capsule 10 mg  10 mg Oral DAILY    levothyroxine (SYNTHROID) tablet 75 mcg  75 mcg Oral ACB    therapeutic multivitamin (THERAGRAN) tablet 1 Tab  1 Tab Oral DAILY    nystatin (MYCOSTATIN) 100,000 unit/gram cream   Topical BID    sodium chloride (NS) flush 5-10 mL  5-10 mL IntraVENous Q8H    sodium chloride (NS) flush 5-10 mL  5-10 mL IntraVENous PRN    acetaminophen (TYLENOL) tablet 650 mg  650 mg Oral Q4H PRN    ondansetron (ZOFRAN) injection 4 mg  4 mg IntraVENous Q4H PRN    heparin (porcine) injection 5,000 Units  5,000 Units SubCUTAneous Q8H    traMADol-acetaminophen (ULTRACET) per tablet 1 Tab  1 Tab Oral Q8H    pantoprazole (PROTONIX) tablet 40 mg  40 mg Oral ACB    arformoterol (BROVANA) neb solution 15 mcg  15 mcg Nebulization BID RT    And    budesonide (PULMICORT) 500 mcg/2 ml nebulizer suspension  500 mcg Nebulization BID RT          LAB AND IMAGING FINDINGS:     Lab Results   Component Value Date/Time    WBC 6.3 06/10/2017 01:59 AM    HGB 10.7 06/10/2017 01:59 AM    PLATELET 080 40/89/3551 01:59 AM     Lab Results   Component Value Date/Time    Sodium 140 06/07/2017 03:33 AM    Potassium 3.5 06/07/2017 03:33 AM    Chloride 106 06/07/2017 03:33 AM    CO2 27 06/07/2017 03:33 AM    BUN 10 06/07/2017 03:33 AM    Creatinine 0.70 06/07/2017 03:33 AM    Calcium 8.7 06/07/2017 03:33 AM      Lab Results   Component Value Date/Time    AST (SGOT) 22 06/06/2017 01:05 PM    Alk.  phosphatase 77 06/06/2017 01:05 PM Protein, total 8.5 06/06/2017 01:05 PM    Albumin 3.1 06/06/2017 01:05 PM    Globulin 5.4 06/06/2017 01:05 PM     No results found for: INR, PTMR, PTP, PT1, PT2, APTT   Lab Results   Component Value Date/Time    Iron 29 06/10/2016 04:33 PM    TIBC 474 06/10/2016 04:33 PM    Iron % saturation 6 06/10/2016 04:33 PM    Ferritin 10 06/10/2016 04:33 PM      No results found for: PH, PCO2, PO2  No components found for: [place]   Lab Results   Component Value Date/Time    CK 79 06/06/2017 01:05 PM    CK - MB <0.5 10/09/2014 01:35 PM                Total time: 70 min   Counseling / coordination time, spent as noted above: 50 min   > 50% counseling / coordination?: yes    Prolonged service was provided for  []30 min   []75 min in face to face time in the presence of the patient, spent as noted above. Time Start:   Time End:   Note: this can only be billed with 18522 (initial) or 68649 (follow up). If multiple start / stop times, list each separately.

## 2017-06-12 NOTE — DISCHARGE SUMMARY
Discharge Summary       PATIENT ID: Lexus Hdez  MRN: [id number]   YOB: 1929    DATE OF ADMISSION: 6/6/2017 12:39 PM    DATE OF DISCHARGE: 6/12/2017   PRIMARY CARE PROVIDER: Erasmo Nash NP     ATTENDING PHYSICIAN: Dionicio Sharp MD  DISCHARGING PROVIDER: Millicent Michaud MD    To contact this individual call [phone] and ask the  to page. If unavailable ask to be transferred the Adult Hospitalist Department. CONSULTATIONS: IP CONSULT TO INTERVENTIONAL RADIOLOGY  IP CONSULT TO PALLIATIVE CARE - PROVIDER    PROCEDURES/SURGERIES: * No surgery found *    [address] COURSE:     Lexus Hdez is a 80 y.o. female who presents with PMHx of COPD, Hypothyroidism, Hx of compression fracture, who was just seen at [facility] for back pain and released back home. Pt unable to provide any meaningful history due to confusion and probable underlying Dementia. Per records, ems was called for back pain. On arrival, patient covered with stool B/L LE and buttocks and in nails. Pt unable to recall if she had a fall and how long she was on the floor. Pt states she has back pain which is worse when she lays on the back, no radiation of pain. Pt unable to describe or quantify the pain. Pt lives with her son at home. No reports of fever, chills, syncope, nausea, vomiting, diarrhea, abdominal pain, chest pain, or sob. Back pain likely related to multiple compression fracture  -s/p Kyphoplast T6 and T10 by IR on 6/9  -MRI Thoracic on 6/7 acute mild/moderate compression fractures at T6 and T10. No evidence of extension into the posterior elements. No significant bulging/retropulsion, chronic compression fractures are noted at T8, T9, and T12, multilevel degenerative changes described above. -MRI Lumbar spine acute vertebral compression deformity along the superior endplate of Z78 without cortical retropulsion or epidural hematoma.  mild multilevel disc and facet degenerative change with minimal  anterolisthesis of L5 on S1, mild canal and right foraminal stenosis at L4-5, mild bilateral foraminal stenoses L3-4.  - continue Tylenol and Tramadol for Pain   - continue PT/OT   Metabolic Encephalopathy superimposed on underlying mild dementia  -improved, more conscious and alert, answer simple questions  -CT Head no acute process  - likely related to dehydration, conscious and alert, confused  - continue with normal saline    Leukocytosis due to dehydration  -no signs of infection at this time  -resolved   Hypothyroidism  -continue with Synthroid   COPD   -stable  -SaO2 96% on RA, on oxygen prn 2 l/m  -continue pulmicort,prn duo neb  Dementia  -continue supportive care    Code status: DNR  DVT prophylaxis: heparin      DISCHARGE DIAGNOSES / PLAN:      Back pain likely related to multiple compression fracture  -s/p Kyphoplast T6 and T10 by IR on 6/9  - continue Tylenol and Tramadol for Pain   - continue PT/OT   Metabolic Encephalopathy superimposed on underlying mild dementia  -improved  Leukocytosis due to dehydration  -resolved   Hypothyroidism  -continue with Synthroid   COPD   -continue prn albuteron, duo neb, advair and prn oxygen, monitor pulse ox  Dementia  -continue supportive care    PENDING TEST RESULTS:   At the time of discharge the following test results are still pending: none    FOLLOW UP APPOINTMENTS:    Follow-up Information     Follow up With Details Comments Contact Gris Negrete [address] [address]  Angel Mercedes NP In one week  [address]  [phone]         ADDITIONAL CARE RECOMMENDATIONS:     DIET: Mechanical Soft Diet    ACTIVITY: Activity as tolerated    WOUND CARE: None     EQUIPMENT needed: none      DISCHARGE MEDICATIONS:  Current Discharge Medication List      START taking these medications    Details   docusate sodium (COLACE) 100 mg capsule Take 2 Caps by mouth two (2) times a day for 90 days. Qty: 60 Cap, Refills: 0      polyethylene glycol (MIRALAX) 17 gram packet Take 1 Packet by mouth daily as needed. Qty: 30 Each, Refills: 0      traMADol-acetaminophen (ULTRACET) 37.5-325 mg per tablet Take 1 Tab by mouth every eight (8) hours. Max Daily Amount: 3 Tabs. Qty: 10 Tab, Refills: 0         CONTINUE these medications which have NOT CHANGED    Details   levothyroxine (SYNTHROID) 75 mcg tablet Take 75 mcg by mouth Daily (before breakfast). baclofen (LIORESAL) 10 mg tablet TAKE 1 TABLET BY MOUTH TWICE DAILY AS NEEDED FOR BACK PAIN/MUSCLE SPASM  Qty: 60 Tab, Refills: 1    Associated Diagnoses: Sacral pain      albuterol-ipratropium (DUO-NEB) 2.5 mg-0.5 mg/3 ml nebu 3 mL by Nebulization route every six (6) hours as needed. Qty: 270 mL, Refills: 2    Associated Diagnoses: Chronic obstructive pulmonary disease, unspecified COPD type (HCC)      tiotropium (SPIRIVA) 18 mcg inhalation capsule Take 1 Cap by inhalation daily. Qty: 30 Cap, Refills: 5    Associated Diagnoses: Chronic obstructive pulmonary disease, unspecified COPD type ([facility] [address]      fluticasone-salmeterol (ADVAIR) 250-50 mcg/dose diskus inhaler Take 1 Puff by inhalation two (2) times a day. Qty: 1 Inhaler, Refills: 5    Associated Diagnoses: Chronic obstructive pulmonary disease, unspecified COPD type (HCC)      albuterol (PROAIR HFA) 90 mcg/actuation inhaler TAKE 2 PUFFS BY INHALATION EVERY SIX (6) HOURS AS NEEDED FOR WHEEZING OR SHORTNESS OF BREATH. Qty: 8.5 Inhaler, Refills: 5    Associated Diagnoses: Chronic obstructive pulmonary disease, unspecified COPD type (HCC)      FLUoxetine (PROZAC) 10 mg capsule TAKE 1 CAPSULE EVERY DAY  Qty: 30 Cap, Refills: 5    Associated Diagnoses: Depression, unspecified depression type      multivitamin (ONE A DAY) tablet TAKE 1 TABLET EVERY DAY  Qty: 90 Tab, Refills: 3      nystatin (MYCOSTATIN) topical cream Apply  to affected area two (2) times a day.  Apply under breasts  Qty: 15 g, Refills: 11    Associated Diagnoses: Cutaneous candidiasis      omeprazole (PRILOSEC) 20 mg capsule TAKE ONE CAPSULE BY MOUTH ONCE DAILY  Qty: 30 Cap, Refills: 1               NOTIFY YOUR PHYSICIAN FOR ANY OF THE FOLLOWING:   Fever over 101 degrees for 24 hours. Chest pain, shortness of breath, fever, chills, nausea, vomiting, diarrhea, change in mentation, falling, weakness, bleeding. Severe pain or pain not relieved by medications. Or, any other signs or symptoms that you may have questions about.     DISPOSITION:    Home With:   OT  PT  HH  RN      x Long term SNF/Inpatient Rehab    Independent/assisted living    Hospice    Other:       PATIENT CONDITION AT DISCHARGE:     Functional status   x Poor     Deconditioned     Independent      Cognition     Lucid     Forgetful    x Dementia      Catheters/lines (plus indication)    Liu     PICC     PEG    x None      Code status     Full code    x DNR      PHYSICAL EXAMINATION AT DISCHARGE:   Refer to Progress Note      CHRONIC MEDICAL DIAGNOSES:  Problem List as of 6/12/2017  Date Reviewed: 5/3/2017          Codes Class Noted - Resolved    Back pain ICD-10-CM: M54.9  ICD-9-CM: 724.5  6/11/2017 - Present        Metabolic encephalopathy OZE-70-SY: G93.41  ICD-9-CM: 348.31  6/6/2017 - Present        Rhabdomyolysis ICD-10-CM: M62.82  ICD-9-CM: 728.88  6/6/2017 - Present        Dehydration ICD-10-CM: E86.0  ICD-9-CM: 276.51  6/6/2017 - Present        * (Principal)Compression fracture of body of thoracic vertebra ([facility] ICD-10-CM: M48.54XA  ICD-9-CM: 733.13  6/6/2017 - Present        Bowel and bladder incontinence ICD-10-CM: R32, R15.9  ICD-9-CM: 788.30, 787.60  5/3/2017 - Present        History of anemia ICD-10-CM: Z86.2  ICD-9-CM: V12.3  5/3/2017 - Present        COPD (chronic obstructive pulmonary disease) ([facility] ICD-10-CM: J44.9  ICD-9-CM: 611  12/3/2015 - Present        History of pneumonia ICD-10-CM: Z87.01  ICD-9-CM: V12.61  12/3/2015 - Present Rheumatoid arthritis ([facility] [address] ICD-10-CM: M06.9  ICD-9-CM: 714.0  7/1/2011 - Present        Hypothyroidism ICD-10-CM: E03.9  ICD-9-CM: 244.9  7/1/2011 - Present        Depression ICD-10-CM: F32.9  ICD-9-CM: 231  7/1/2011 - Present        Dementia ICD-10-CM: F03.90  ICD-9-CM: 294.20  7/1/2011 - Present    Overview Signed 7/1/2011  2:58 PM by Aide Guallpa MD     mild             RESOLVED: Pneumonia ICD-10-CM: J18.9  ICD-9-CM: 160  10/9/2014 - 12/3/2015              Greater than 45 minutes were spent with the patient on counseling and coordination of care    Signed:   Sol Parker MD  6/12/2017  3:53 PM

## 2017-06-12 NOTE — DISCHARGE INSTRUCTIONS
Discharge SNF/Rehab Instructions/LTAC       PATIENT ID: Mely Hannah  MRN: [id number]   YOB: 1929    DATE OF ADMISSION: 6/6/2017 12:39 PM    DATE OF DISCHARGE: 6/12/2017    PRIMARY CARE PROVIDER: Annalise Marte NP       ATTENDING PHYSICIAN: Vincent Rios MD  DISCHARGING PROVIDER: Vincent Rios MD     To contact this individual call [phone] and ask the  to page. If unavailable ask to be transferred the Adult Hospitalist Department. CONSULTATIONS: IP CONSULT TO INTERVENTIONAL RADIOLOGY  IP CONSULT TO PALLIATIVE CARE - PROVIDER    PROCEDURES/SURGERIES: * No surgery found *    [address] COURSE:     Mely Hannah is a 80 y.o. female who presents with PMHx of COPD, Hypothyroidism, Hx of compression fracture, who was just seen at [facility] for back pain and released back home. Pt unable to provide any meaningful history due to confusion and probable underlying Dementia. Per records, ems was called for back pain. On arrival, patient covered with stool B/L LE and buttocks and in nails. Pt unable to recall if she had a fall and how long she was on the floor. Pt states she has back pain which is worse when she lays on the back, no radiation of pain. Pt unable to describe or quantify the pain. Pt lives with her son at home. No reports of fever, chills, syncope, nausea, vomiting, diarrhea, abdominal pain, chest pain, or sob. Back pain likely related to multiple compression fracture  -s/p Kyphoplasty T6 and T10 by IR on 6/9  -MRI Thoracic on 6/7 acute mild/moderate compression fractures at T6 and T10. No evidence of extension into the posterior elements. No significant bulging/retropulsion, chronic compression fractures are noted at T8, T9, and T12, multilevel degenerative changes described above. -MRI Lumbar spine acute vertebral compression deformity along the superior endplate of X42 without cortical retropulsion or epidural hematoma.  mild multilevel disc and facet degenerative change with minimal  anterolisthesis of L5 on S1, mild canal and right foraminal stenosis at L4-5, mild bilateral foraminal stenoses L3-4.  - continue Tylenol and Tramadol for Pain   - PT/OT      Metabolic Encephalopathy superimposed on underlying mild dementia  -CT Head no acute process  - likely related to dehydration, conscious and alert, confused  - improved with IV fluids, now stopped      Leukocytosis due to dehydration  -no signs of infection at this time  -resolved      Hypothyroidism  -continue with Synthroid     COPD vs Pneumonia  -stable  -SaO2 96% on RA  -continue pulmicort,prn duo neb  -Brandyn wheezes  -CXR air space disease  -On Zpack/oral steroid at discharge. Will discharge her on 7 day course of LVQ  Outpatient follow up CXR    Obesity  -diet and weight loss program    Code status: DNR  DVT prophylaxis: heparin    PENDING TEST RESULTS:   At the time of discharge the following test results are still pending: none    FOLLOW UP APPOINTMENTS:    Follow-up Information     Follow up With Details Comments Contact Gris Owusu [address] [place] [address]  [phone]         ADDITIONAL CARE RECOMMENDATIONS:    DIET: Mechanical Soft Diet     TUBE FEEDING INSTRUCTIONS: none    OXYGEN / BiPAP SETTINGS: PRN Oxygen 2 l/m via nasal canula if SaO2 < 90% on RA    ACTIVITY: Activity as tolerated    WOUND CARE: none    EQUIPMENT needed: none      DISCHARGE MEDICATIONS:   See Medication Reconciliation Form      NOTIFY YOUR PHYSICIAN FOR ANY OF THE FOLLOWING:   Fever over 101 degrees for 24 hours. Chest pain, shortness of breath, fever, chills, nausea, vomiting, diarrhea, change in mentation, falling, weakness, bleeding. Severe pain or pain not relieved by medications. Or, any other signs or symptoms that you may have questions about.     DISPOSITION:    Home With:   OT  PT  HH  RN      X SNF/Inpatient Rehab/LTAC    Independent/assisted living Hospice    Other:       PATIENT CONDITION AT DISCHARGE:     Functional status   x Poor     Deconditioned     Independent      Cognition     Lucid     Forgetful    x Dementia      Catheters/lines (plus indication)    Lui     PICC     PEG    x None      Code status     Full code    X DNR      PHYSICAL EXAMINATION AT DISCHARGE:   Refer to Progress Note      CHRONIC MEDICAL DIAGNOSES:  Problem List as of 6/12/2017  Date Reviewed: 5/3/2017          Codes Class Noted - Resolved    Back pain ICD-10-CM: M54.9  ICD-9-CM: 724.5  6/11/2017 - Present        Metabolic encephalopathy BCE-32-XP: G93.41  ICD-9-CM: 348.31  6/6/2017 - Present        Rhabdomyolysis ICD-10-CM: M62.82  ICD-9-CM: 728.88  6/6/2017 - Present        Dehydration ICD-10-CM: E86.0  ICD-9-CM: 276.51  6/6/2017 - Present        * (Principal)Compression fracture of body of thoracic vertebra ([facility] ICD-10-CM: M48.54XA  ICD-9-CM: 733.13  6/6/2017 - Present        Bowel and bladder incontinence ICD-10-CM: R32, R15.9  ICD-9-CM: 788.30, 787.60  5/3/2017 - Present        History of anemia ICD-10-CM: Z86.2  ICD-9-CM: V12.3  5/3/2017 - Present        COPD (chronic obstructive pulmonary disease) ([facility] ICD-10-CM: J44.9  ICD-9-CM: 041  12/3/2015 - Present        History of pneumonia ICD-10-CM: Z87.01  ICD-9-CM: V12.61  12/3/2015 - Present        Rheumatoid arthritis ([facility] ICD-10-CM: M06.9  ICD-9-CM: 714.0  7/1/2011 - Present        Hypothyroidism ICD-10-CM: E03.9  ICD-9-CM: 244.9  7/1/2011 - Present        Depression ICD-10-CM: F32.9  ICD-9-CM: 972  7/1/2011 - Present        Dementia ICD-10-CM: F03.90  ICD-9-CM: 294.20  7/1/2011 - Present    Overview Signed 7/1/2011  2:58 PM by Cindy Hunt MD     mild             RESOLVED: Pneumonia ICD-10-CM: J18.9  ICD-9-CM: 904  10/9/2014 - 12/3/2015                CDMP Checked:   Yes x     PROBLEM LIST Updated:  Yes x         Signed:   Carisa Leyva MD  6/12/2017  8:09 AM

## 2017-06-12 NOTE — PROGRESS NOTES
Problem: Mobility Impaired (Adult and Pediatric)  Goal: *Acute Goals and Plan of Care (Insert Text)  Physical Therapy Goals  Initiated 6/7/2017  1. Patient will move from supine to sit and sit to supine in bed with supervision/set-up within 7 day(s). 2. Patient will transfer from bed to chair and chair to bed with contact guard assist using the least restrictive device within 7 day(s). 3. Patient will perform sit to stand with contact guard assist within 7 day(s). 4. Patient will ambulate with minimal assistance for 10 feet with the least restrictive device within 7 day(s). PHYSICAL THERAPY TREATMENT  Patient: Bella Carpenter (31 y.o. female)  Date: 6/12/2017  Diagnosis: Compression fracture of body of thoracic vertebra (HCC)  Dehydration  Rhabdomyolysis  Metabolic encephalopathy  Compression fracture of body of thoracic vertebra (HCC)  Back pain  Metabolic encephalopathy Compression fracture of body of thoracic vertebra [facility])       Precautions: Fall  Chart, physical therapy assessment, plan of care and goals were reviewed. ASSESSMENT:  Pt sleeping on arrival. Pt was able to awake to verbal and tapping foot. Pt had decrease following verbal commands was able to get pt to sit EOB but pt requesting to return supine. Pt initiated task and able to bring LE into bed. Pt positioned herself for comfort. Pt is limited by confusion. Pt may benefit from SNF at discharge. Progression toward goals:  [X]      Improving appropriately and progressing toward goals  [ ]      Improving slowly and progressing toward goals  [ ]      Not making progress toward goals and plan of care will be adjusted       PLAN:  Patient continues to benefit from skilled intervention to address the above impairments. Continue treatment per established plan of care. Discharge Recommendations:  Salbador Montero  Further Equipment Recommendations for Discharge:  TBD       SUBJECTIVE:   Patient stated Let me lay down.  OBJECTIVE DATA SUMMARY:   Critical Behavior:  Neurologic State: Confused  Orientation Level: Oriented to person, Disoriented to time, Disoriented to situation, Disoriented to place  Cognition: Follows commands  Safety/Judgement: Decreased awareness of environment, Decreased awareness of need for assistance, Decreased awareness of need for safety, Decreased insight into deficits  Functional Mobility Training:  Bed Mobility:     Supine to Sit: Moderate assistance; Additional time facilitation with LE and pt initiated with trunk. Sit to Supine: Minimum assistance pt able to bring LE into bed and scoot herself sideline for positioning                        Transfers:                                   Balance:  Sitting: Impaired  Ambulation/Gait Training:                                                                   Stairs:              Neuro Re-Education:     Therapeutic Exercises:      Pain:  Pain Scale 1: Numeric (0 - 10)  Pain Intensity 1: 0              Activity Tolerance:   Limited   Please refer to the flowsheet for vital signs taken during this treatment.   After treatment:   [ ] Patient left in no apparent distress sitting up in chair  [X] Patient left in no apparent distress in bed  [X] Call bell left within reach  [X] Nursing notified  [ ] Caregiver present  [ ] Bed alarm activated      COMMUNICATION/COLLABORATION:   The patients plan of care was discussed with: Registered Nurse     Carmen Sinha PTA   Time Calculation: 10 mins

## 2017-06-12 NOTE — PROGRESS NOTES
Bedside shift change report given to Ples Snellen , RN (oncoming nurse) by Prabha Morris RN (offgoing nurse). Report given with SBAR, Kardex, Intake/Output and MAR.

## 2017-06-12 NOTE — PROGRESS NOTES
Occupational Therapy Note    Reviewed pt chart and cleared for OT. Pt received supine in bed and sleeping, but awoke upon verbal and tapping. Reintroduced pt to self and purpose of therapy; Offered pt to EOB for lunch but pt declined, stating \"just bring it to me here\". Attempted to encouraged and educate pt on OOB activity and participating in ADLs, however, despite encouragement, pt continued to decline. Will attempt to follow up w pt as able. Thank you,  Tenzin Olsen    Regarding student involvement in patient care:  A student participated in this treatment session. Per CMS Medicare statements and AOTA guidelines I certify that the following was true:  1. I was present and directly observed the entire session. 2. I made all skilled judgments and clinical decisions regarding care. 3. I am the practitioner responsible for assessment, treatment, and documentation.

## 2017-06-12 NOTE — PROGRESS NOTES
Hospitalist Progress Note  Kevin Osman MD  Office: [phone]  Cell: [phone]      Date of Service:  2017  NAME:  Bella Carpenter  :  1929  MRN:  [id number]      Admission Summary:   Bella Carpenter is a 80 y.o. female who presents with PMHx of COPD, Hypothyroidism, Hx of compression fracture, who was just seen at [facility] for back pain and released back home. Pt unable to provide any meaningful history due to confusion and probable underlying Dementia. Per records, ems was called for back pain. On arrival, patient covered with stool B/L LE and buttocks and in nails. Pt unable to recall if she had a fall and how long she was on the floor. Pt states she has back pain which is worse when she lays on the back, no radiation of pain. Pt unable to describe or quantify the pain. Pt lives with her son at home. No reports of fever, chills, syncope, nausea, vomiting, diarrhea, abdominal pain, chest pain, or sob. Interval history / Subjective:   Back pain better, she said she wants to go home,      Assessment & Plan:     Back pain likely related to multiple compression fracture  -s/p Kyphoplast T6 and T10 by IR on   -MRI Thoracic on  acute mild/moderate compression fractures at T6 and T10. No evidence of extension into the posterior elements. No significant bulging/retropulsion, chronic compression fractures are noted at T8, T9, and T12, multilevel degenerative changes described above. -MRI Lumbar spine acute vertebral compression deformity along the superior endplate of W13 without cortical retropulsion or epidural hematoma.  mild multilevel disc and facet degenerative change with minimal  anterolisthesis of L5 on S1, mild canal and right foraminal stenosis at L4-5, mild bilateral foraminal stenoses L3-4.  - continue Tylenol and Tramadol for Pain   - PT/OT      Metabolic Encephalopathy superimposed on underlying mild dementia  -CT Head no acute process  - likely related to dehydration, conscious and alert, confused  - continue with normal saline       Leukocytosis due to dehydration  -no signs of infection at this time  -resolved      Hypothyroidism  -continue with Synthroid     COPD   -stable  -SaO2 96% on RA  -continue pulmicort,prn duo neb    Obesity  -diet and weight loss program        Code status: DNR  DVT prophylaxis: heparin    Care Plan discussed with: Patient/Family, Nurse and   Disposition: SNF 6/12  Care plan including discharge plan was discussed with her son at bedside, questions answered on 6/10     Hospital Problems  Date Reviewed: 5/3/2017          Codes Class Noted POA    Back pain ICD-10-CM: M54.9  ICD-9-CM: 724.5  6/11/2017 Unknown        Metabolic encephalopathy KHI-97-UQ: G93.41  ICD-9-CM: 348.31  6/6/2017 Yes        Rhabdomyolysis ICD-10-CM: M62.82  ICD-9-CM: 728.88  6/6/2017 Yes        Dehydration ICD-10-CM: E86.0  ICD-9-CM: 276.51  6/6/2017 Yes        * (Principal)Compression fracture of body of thoracic vertebra ([facility] ICD-10-CM: M48.54XA  ICD-9-CM: 733.13  6/6/2017 Yes                Vital Signs:    Last 24hrs VS reviewed since prior progress note. Most recent are:  Visit Vitals    /42 (BP 1 Location: Left arm, BP Patient Position: At rest)    Pulse 84    Temp 98.5 °F (36.9 °C)    Resp 14    Ht 5' 1\" (1.549 m)    Wt 77.4 kg (170 lb 10.2 oz)    SpO2 97%    BMI 32.24 kg/m2         Intake/Output Summary (Last 24 hours) at 06/12/17 0805  Last data filed at 06/11/17 1928   Gross per 24 hour   Intake              768 ml   Output                0 ml   Net              768 ml        Physical Examination:             Constitutional:  No acute distress, cooperative, pleasant    ENT:  Oral mucous moist, oropharynx benign. Neck supple,    Resp:  CTA bilaterally. No wheezing/rhonchi/rales.  No accessory muscle use   CV:  Regular rhythm, normal rate, no murmurs, gallops, rubs    GI:  Soft, non distended, non tender. normoactive bowel sounds, no hepatosplenomegaly     Musculoskeletal:  No edema    Neurologic:  Conscious and alert, answer simple questions and follows simple commands, motor UE 5/5, LE 4/5     Skin:  no [facility]       Data Review:    Review and/or order of clinical lab test      Labs:     Recent Labs      06/10/17   0159   WBC  6.3   HGB  10.7*   HCT  35.1   PLT  211     No results for input(s): NA, K, CL, CO2, BUN, CREA, GLU, CA, MG, PHOS, URICA in the last 72 hours. No results for input(s): SGOT, GPT, ALT, AP, TBIL, TBILI, TP, ALB, GLOB, GGT, AML, LPSE in the last 72 hours. No lab exists for component: AMYP, HLPSE  No results for input(s): INR, PTP, APTT in the last 72 hours. No lab exists for component: INREXT, INREXT   No results for input(s): FE, TIBC, PSAT, FERR in the last 72 hours. Lab Results   Component Value Date/Time    Folate 17.7 06/10/2016 04:33 PM      No results for input(s): PH, PCO2, PO2 in the last 72 hours. No results for input(s): CPK, CKNDX, TROIQ in the last 72 hours.     No lab exists for component: CPKMB  Lab Results   Component Value Date/Time    Cholesterol, total 174 06/10/2016 04:33 PM    HDL Cholesterol 62 06/10/2016 04:33 PM    LDL, calculated 86 06/10/2016 04:33 PM    Triglyceride 128 06/10/2016 04:33 PM     Lab Results   Component Value Date/Time    Glucose (POC) 146 10/10/2014 11:45 AM    Glucose (POC) 176 10/10/2014 07:24 AM    Glucose (POC) 194 10/09/2014 09:17 PM    Glucose (POC) 185 10/09/2014 06:50 PM     Lab Results   Component Value Date/Time    Color DARK YELLOW 06/06/2017 01:05 PM    Appearance CLOUDY 06/06/2017 01:05 PM    Specific gravity 1.030 06/06/2017 01:05 PM    Specific gravity 1.030 05/11/2017 01:59 PM    pH (UA) 6.0 06/06/2017 01:05 PM    Protein 30 06/06/2017 01:05 PM    Glucose NEGATIVE  06/06/2017 01:05 PM    Ketone 40 06/06/2017 01:05 PM    Bilirubin NEGATIVE  10/09/2014 02:17 PM    Urobilinogen 1.0 06/06/2017 01:05 PM Nitrites NEGATIVE  06/06/2017 01:05 PM    Leukocyte Esterase NEGATIVE  06/06/2017 01:05 PM    Epithelial cells FEW 06/06/2017 01:05 PM    Bacteria NEGATIVE  06/06/2017 01:05 PM    WBC 0-4 06/06/2017 01:05 PM    RBC 0-5 06/06/2017 01:05 PM         Medications Reviewed:     Current Facility-Administered Medications   Medication Dose Route Frequency    docusate sodium (COLACE) capsule 200 mg  200 mg Oral BID    polyethylene glycol (MIRALAX) packet 17 g  17 g Oral BID    sodium chloride (NS) flush 5-10 mL  5-10 mL IntraVENous Q8H    sodium chloride (NS) flush 5-10 mL  5-10 mL IntraVENous PRN    0.9% sodium chloride infusion  50 mL/hr IntraVENous CONTINUOUS    albuterol-ipratropium (DUO-NEB) 2.5 MG-0.5 MG/3 ML  3 mL Nebulization Q6H PRN    FLUoxetine (PROzac) capsule 10 mg  10 mg Oral DAILY    levothyroxine (SYNTHROID) tablet 75 mcg  75 mcg Oral ACB    therapeutic multivitamin (THERAGRAN) tablet 1 Tab  1 Tab Oral DAILY    nystatin (MYCOSTATIN) 100,000 unit/gram cream   Topical BID    sodium chloride (NS) flush 5-10 mL  5-10 mL IntraVENous Q8H    sodium chloride (NS) flush 5-10 mL  5-10 mL IntraVENous PRN    acetaminophen (TYLENOL) tablet 650 mg  650 mg Oral Q4H PRN    ondansetron (ZOFRAN) injection 4 mg  4 mg IntraVENous Q4H PRN    heparin (porcine) injection 5,000 Units  5,000 Units SubCUTAneous Q8H    traMADol-acetaminophen (ULTRACET) per tablet 1 Tab  1 Tab Oral Q8H    pantoprazole (PROTONIX) tablet 40 mg  40 mg Oral ACB    arformoterol (BROVANA) neb solution 15 mcg  15 mcg Nebulization BID RT    And    budesonide (PULMICORT) 500 mcg/2 ml nebulizer suspension  500 mcg Nebulization BID RT     ______________________________________________________________________  EXPECTED LENGTH OF STAY: 5d 12h  ACTUAL LENGTH OF STAY:          2                 Quilla Promise, MD

## 2017-06-13 ENCOUNTER — APPOINTMENT (OUTPATIENT)
Dept: GENERAL RADIOLOGY | Age: 82
DRG: 515 | End: 2017-06-13
Attending: HOSPITALIST
Payer: MEDICARE

## 2017-06-13 PROCEDURE — 74011250636 HC RX REV CODE- 250/636: Performed by: HOSPITALIST

## 2017-06-13 PROCEDURE — 65270000029 HC RM PRIVATE

## 2017-06-13 PROCEDURE — 94640 AIRWAY INHALATION TREATMENT: CPT

## 2017-06-13 PROCEDURE — 74011000250 HC RX REV CODE- 250: Performed by: HOSPITALIST

## 2017-06-13 PROCEDURE — 74011250637 HC RX REV CODE- 250/637: Performed by: HOSPITALIST

## 2017-06-13 PROCEDURE — 71010 XR CHEST PORT: CPT

## 2017-06-13 PROCEDURE — 77010033678 HC OXYGEN DAILY

## 2017-06-13 RX ADMIN — HEPARIN SODIUM 5000 UNITS: 5000 INJECTION, SOLUTION INTRAVENOUS; SUBCUTANEOUS at 21:07

## 2017-06-13 RX ADMIN — HEPARIN SODIUM 5000 UNITS: 5000 INJECTION, SOLUTION INTRAVENOUS; SUBCUTANEOUS at 13:11

## 2017-06-13 RX ADMIN — FLUOXETINE 10 MG: 10 CAPSULE ORAL at 08:16

## 2017-06-13 RX ADMIN — NYSTATIN: 100000 CREAM TOPICAL at 17:08

## 2017-06-13 RX ADMIN — Medication 10 ML: at 13:11

## 2017-06-13 RX ADMIN — PANTOPRAZOLE SODIUM 40 MG: 40 TABLET, DELAYED RELEASE ORAL at 07:06

## 2017-06-13 RX ADMIN — ACETAMINOPHEN 650 MG: 325 TABLET, FILM COATED ORAL at 17:08

## 2017-06-13 RX ADMIN — HEPARIN SODIUM 5000 UNITS: 5000 INJECTION, SOLUTION INTRAVENOUS; SUBCUTANEOUS at 07:06

## 2017-06-13 RX ADMIN — LEVOTHYROXINE SODIUM 75 MCG: 75 TABLET ORAL at 07:06

## 2017-06-13 RX ADMIN — THERA TABS 1 TABLET: TAB at 08:16

## 2017-06-13 RX ADMIN — TRAMADOL HYDROCHLORIDE AND ACETAMINOPHEN 1 TABLET: 37.5; 325 TABLET, FILM COATED ORAL at 07:06

## 2017-06-13 RX ADMIN — NYSTATIN: 100000 CREAM TOPICAL at 08:16

## 2017-06-13 RX ADMIN — Medication 10 ML: at 21:06

## 2017-06-13 RX ADMIN — BUDESONIDE 500 MCG: 0.5 INHALANT RESPIRATORY (INHALATION) at 08:23

## 2017-06-13 RX ADMIN — ARFORMOTEROL TARTRATE 15 MCG: 15 SOLUTION RESPIRATORY (INHALATION) at 08:23

## 2017-06-13 NOTE — ROUTINE PROCESS
Bedside shift change report given to Jimy HoltRN (oncoming nurse) by Raymundo Mendez RN(offgoing nurse). Report given with SBAR.

## 2017-06-13 NOTE — PROGRESS NOTES
Hospitalist Progress Note  Sylvia Mao MD  Office: [phone]  Cell: [phone]      Date of Service:  2017  NAME:  Phillip Tyson  :  1929  MRN:  [id number]      Admission Summary:   Phillip Tyson is a 80 y.o. female who presents with PMHx of COPD, Hypothyroidism, Hx of compression fracture, who was just seen at [facility] for back pain and released back home. Pt unable to provide any meaningful history due to confusion and probable underlying Dementia. Per records, ems was called for back pain. On arrival, patient covered with stool B/L LE and buttocks and in nails. Pt unable to recall if she had a fall and how long she was on the floor. Pt states she has back pain which is worse when she lays on the back, no radiation of pain. Pt unable to describe or quantify the pain. Pt lives with her son at home. No reports of fever, chills, syncope, nausea, vomiting, diarrhea, abdominal pain, chest pain, or sob. Interval history / Subjective:   Back pain better, she said she wants to go home,      Assessment & Plan:     Back pain likely related to multiple compression fracture  -s/p Kyphoplasty T6 and T10 by IR on   -MRI Thoracic on  acute mild/moderate compression fractures at T6 and T10. No evidence of extension into the posterior elements. No significant bulging/retropulsion, chronic compression fractures are noted at T8, T9, and T12, multilevel degenerative changes described above. -MRI Lumbar spine acute vertebral compression deformity along the superior endplate of P34 without cortical retropulsion or epidural hematoma.  mild multilevel disc and facet degenerative change with minimal  anterolisthesis of L5 on S1, mild canal and right foraminal stenosis at L4-5, mild bilateral foraminal stenoses L3-4.  - continue Tylenol and Tramadol for Pain   - PT/OT      Metabolic Encephalopathy superimposed on underlying mild dementia  -CT Head no acute process  - likely related to dehydration, conscious and alert, confused  - continue with normal saline       Leukocytosis due to dehydration  -no signs of infection at this time  -resolved      Hypothyroidism  -continue with Synthroid     COPD   -stable  -SaO2 96% on RA  -continue pulmicort,prn duo neb    Obesity  -diet and weight loss program        Code status: DNR  DVT prophylaxis: heparin    Plan: Awaiting insurance auth before discharge    Care Plan discussed with: Patient/Family, Nurse and   Disposition: SNF      Hospital Problems  Date Reviewed: 5/3/2017          Codes Class Noted POA    Back pain ICD-10-CM: M54.9  ICD-9-CM: 724.5  6/11/2017 Unknown        Metabolic encephalopathy JSU-42-LR: G93.41  ICD-9-CM: 348.31  6/6/2017 Yes        Rhabdomyolysis ICD-10-CM: M62.82  ICD-9-CM: 728.88  6/6/2017 Yes        Dehydration ICD-10-CM: E86.0  ICD-9-CM: 276.51  6/6/2017 Yes        * (Principal)Compression fracture of body of thoracic vertebra ([facility] [address] ICD-10-CM: M48.54XA  ICD-9-CM: 733.13  6/6/2017 Yes                Vital Signs:    Last 24hrs VS reviewed since prior progress note. Most recent are:  Visit Vitals    /80 (BP 1 Location: Left arm, BP Patient Position: At rest)    Pulse 86    Temp 97.9 °F (36.6 °C)    Resp 16    Ht 5' 1\" (1.549 m)    Wt 77.4 kg (170 lb 10.2 oz)    SpO2 95%    BMI 32.24 kg/m2       No intake or output data in the 24 hours ending 06/13/17 0945     Physical Examination:             Constitutional:  No acute distress, cooperative, pleasant    ENT:  Oral mucous moist, oropharynx benign. Neck supple,    Resp:  CTA bilaterally. No wheezing/rhonchi/rales. No accessory muscle use   CV:  Regular rhythm, normal rate, no murmurs, gallops, rubs    GI:  Soft, non distended, non tender.  normoactive bowel sounds, no hepatosplenomegaly     Musculoskeletal:  No edema    Neurologic:  Conscious and alert, answer simple questions and follows simple commands, motor UE 5/5, LE 4/5     Skin:  no [facility]       Data Review:    Review and/or order of clinical lab test      Labs:     No results for input(s): WBC, HGB, HCT, PLT, HGBEXT, HCTEXT, PLTEXT, HGBEXT, HCTEXT, PLTEXT in the last 72 hours. No results for input(s): NA, K, CL, CO2, BUN, CREA, GLU, CA, MG, PHOS, URICA in the last 72 hours. No results for input(s): SGOT, GPT, ALT, AP, TBIL, TBILI, TP, ALB, GLOB, GGT, AML, LPSE in the last 72 hours. No lab exists for component: AMYP, HLPSE  No results for input(s): INR, PTP, APTT in the last 72 hours. No lab exists for component: INREXT, INREXT   No results for input(s): FE, TIBC, PSAT, FERR in the last 72 hours. Lab Results   Component Value Date/Time    Folate 17.7 06/10/2016 04:33 PM      No results for input(s): PH, PCO2, PO2 in the last 72 hours. No results for input(s): CPK, CKNDX, TROIQ in the last 72 hours.     No lab exists for component: CPKMB  Lab Results   Component Value Date/Time    Cholesterol, total 174 06/10/2016 04:33 PM    HDL Cholesterol 62 06/10/2016 04:33 PM    LDL, calculated 86 06/10/2016 04:33 PM    Triglyceride 128 06/10/2016 04:33 PM     Lab Results   Component Value Date/Time    Glucose (POC) 146 10/10/2014 11:45 AM    Glucose (POC) 176 10/10/2014 07:24 AM    Glucose (POC) 194 10/09/2014 09:17 PM    Glucose (POC) 185 10/09/2014 06:50 PM     Lab Results   Component Value Date/Time    Color DARK YELLOW 06/06/2017 01:05 PM    Appearance CLOUDY 06/06/2017 01:05 PM    Specific gravity 1.030 06/06/2017 01:05 PM    Specific gravity 1.030 05/11/2017 01:59 PM    pH (UA) 6.0 06/06/2017 01:05 PM    Protein 30 06/06/2017 01:05 PM    Glucose NEGATIVE  06/06/2017 01:05 PM    Ketone 40 06/06/2017 01:05 PM    Bilirubin NEGATIVE  10/09/2014 02:17 PM    Urobilinogen 1.0 06/06/2017 01:05 PM    Nitrites NEGATIVE  06/06/2017 01:05 PM    Leukocyte Esterase NEGATIVE  06/06/2017 01:05 PM    Epithelial cells FEW 06/06/2017 01:05 PM    Bacteria NEGATIVE  06/06/2017 01:05 PM WBC 0-4 06/06/2017 01:05 PM    RBC 0-5 06/06/2017 01:05 PM         Medications Reviewed:     Current Facility-Administered Medications   Medication Dose Route Frequency    docusate sodium (COLACE) capsule 200 mg  200 mg Oral BID    polyethylene glycol (MIRALAX) packet 17 g  17 g Oral BID    sodium chloride (NS) flush 5-10 mL  5-10 mL IntraVENous Q8H    sodium chloride (NS) flush 5-10 mL  5-10 mL IntraVENous PRN    0.9% sodium chloride infusion  50 mL/hr IntraVENous CONTINUOUS    albuterol-ipratropium (DUO-NEB) 2.5 MG-0.5 MG/3 ML  3 mL Nebulization Q6H PRN    FLUoxetine (PROzac) capsule 10 mg  10 mg Oral DAILY    levothyroxine (SYNTHROID) tablet 75 mcg  75 mcg Oral ACB    therapeutic multivitamin (THERAGRAN) tablet 1 Tab  1 Tab Oral DAILY    nystatin (MYCOSTATIN) 100,000 unit/gram cream   Topical BID    sodium chloride (NS) flush 5-10 mL  5-10 mL IntraVENous Q8H    sodium chloride (NS) flush 5-10 mL  5-10 mL IntraVENous PRN    acetaminophen (TYLENOL) tablet 650 mg  650 mg Oral Q4H PRN    ondansetron (ZOFRAN) injection 4 mg  4 mg IntraVENous Q4H PRN    heparin (porcine) injection 5,000 Units  5,000 Units SubCUTAneous Q8H    traMADol-acetaminophen (ULTRACET) per tablet 1 Tab  1 Tab Oral Q8H    pantoprazole (PROTONIX) tablet 40 mg  40 mg Oral ACB    arformoterol (BROVANA) neb solution 15 mcg  15 mcg Nebulization BID RT    And    budesonide (PULMICORT) 500 mcg/2 ml nebulizer suspension  500 mcg Nebulization BID RT     ______________________________________________________________________  EXPECTED LENGTH OF STAY: 5d 12h  ACTUAL LENGTH OF STAY:          3                 Rosita Zarate MD

## 2017-06-14 PROCEDURE — 74011000250 HC RX REV CODE- 250: Performed by: HOSPITALIST

## 2017-06-14 PROCEDURE — 74011250636 HC RX REV CODE- 250/636: Performed by: HOSPITALIST

## 2017-06-14 PROCEDURE — 74011250637 HC RX REV CODE- 250/637: Performed by: HOSPITALIST

## 2017-06-14 PROCEDURE — 77010033678 HC OXYGEN DAILY

## 2017-06-14 PROCEDURE — 65270000029 HC RM PRIVATE

## 2017-06-14 PROCEDURE — 94640 AIRWAY INHALATION TREATMENT: CPT

## 2017-06-14 RX ORDER — AZITHROMYCIN 250 MG/1
250 TABLET, FILM COATED ORAL DAILY
Status: DISCONTINUED | OUTPATIENT
Start: 2017-06-15 | End: 2017-06-16 | Stop reason: HOSPADM

## 2017-06-14 RX ORDER — PREDNISONE 20 MG/1
40 TABLET ORAL
Status: DISCONTINUED | OUTPATIENT
Start: 2017-06-15 | End: 2017-06-16 | Stop reason: HOSPADM

## 2017-06-14 RX ORDER — TRAMADOL HYDROCHLORIDE 50 MG/1
50 TABLET ORAL
Status: DISCONTINUED | OUTPATIENT
Start: 2017-06-14 | End: 2017-06-16 | Stop reason: HOSPADM

## 2017-06-14 RX ADMIN — NYSTATIN: 100000 CREAM TOPICAL at 08:13

## 2017-06-14 RX ADMIN — THERA TABS 1 TABLET: TAB at 08:12

## 2017-06-14 RX ADMIN — FLUOXETINE 10 MG: 10 CAPSULE ORAL at 08:12

## 2017-06-14 RX ADMIN — LEVOTHYROXINE SODIUM 75 MCG: 75 TABLET ORAL at 06:40

## 2017-06-14 RX ADMIN — PANTOPRAZOLE SODIUM 40 MG: 40 TABLET, DELAYED RELEASE ORAL at 06:40

## 2017-06-14 RX ADMIN — HEPARIN SODIUM 5000 UNITS: 5000 INJECTION, SOLUTION INTRAVENOUS; SUBCUTANEOUS at 15:31

## 2017-06-14 RX ADMIN — ARFORMOTEROL TARTRATE 15 MCG: 15 SOLUTION RESPIRATORY (INHALATION) at 07:10

## 2017-06-14 RX ADMIN — Medication 10 ML: at 15:31

## 2017-06-14 RX ADMIN — DOCUSATE SODIUM 200 MG: 100 CAPSULE, LIQUID FILLED ORAL at 08:12

## 2017-06-14 RX ADMIN — BUDESONIDE 500 MCG: 0.5 INHALANT RESPIRATORY (INHALATION) at 07:10

## 2017-06-14 RX ADMIN — ACETAMINOPHEN 650 MG: 325 TABLET, FILM COATED ORAL at 00:07

## 2017-06-14 RX ADMIN — ARFORMOTEROL TARTRATE 15 MCG: 15 SOLUTION RESPIRATORY (INHALATION) at 19:35

## 2017-06-14 RX ADMIN — BUDESONIDE 500 MCG: 0.5 INHALANT RESPIRATORY (INHALATION) at 19:35

## 2017-06-14 RX ADMIN — Medication 5 ML: at 06:00

## 2017-06-14 RX ADMIN — HEPARIN SODIUM 5000 UNITS: 5000 INJECTION, SOLUTION INTRAVENOUS; SUBCUTANEOUS at 06:40

## 2017-06-14 NOTE — PROGRESS NOTES
TRANSFER - OUT REPORT:    Verbal report given to 2N RN(name) on [employer] [facility]  being transferred to 2N(unit) for routine progression of care       Report consisted of patients Situation, Background, Assessment and   Recommendations(SBAR). Information from the following report(s) SBAR and Kardex was reviewed with the receiving nurse. Lines:   Peripheral IV 06/08/17 Right;Upper (Active)   Site Assessment Clean, dry, & intact 6/14/2017  9:33 AM   Phlebitis Assessment 0 6/14/2017  9:33 AM   Infiltration Assessment 0 6/14/2017  9:33 AM   Dressing Status Clean, dry, & intact 6/14/2017  9:33 AM   Dressing Type Transparent 6/14/2017  9:33 AM   Hub Color/Line Status Capped 6/14/2017  9:33 AM   Action Taken Open ports on tubing capped 6/14/2017  9:33 AM   Alcohol Cap Used Yes 6/14/2017  9:33 AM        Opportunity for questions and clarification was provided.

## 2017-06-14 NOTE — ADT AUTH CERT NOTES
Back Pain - Care Day 2 (6/12/2017) by Gerson Grant RN        Review Status Review Entered       Completed 6/13/2017       Details              Care Day: 2 Care Date: 6/12/2017 Level of Care: Inpatient Floor       Guideline Day 2        Clinical Status       (X) * Pain absent or managed       (X) * Acute etiologies requiring continued inpatient care absent       ( ) * Discharge plans and education understood              Activity       (X) * Ambulation as tolerated [D]              Routes       (X) * Oral hydration, medications, and diet       6/13/2017 2:51 PM EDT by America Garcia         Brovana nebs bid. ..pulmicort nebs. ...heparin 5000u sq q8h. .. .                     Medications       (X) * Oral analgesics       6/13/2017 2:51 PM EDT by America Garcia         ultracet po q8h. .. .              (X) * PCA absent              6/13/2017 2:52 PM EDT by America Garcia       Subject: Additional Clinical Information       6-12-17:   notation correction: notes below are for 6-12, not 6-13-17. Dion Albarran .              6/13/2017 2:51 PM EDT by America Garcia       Subject: Additional Clinical Information       6-13-17: clinical review:VS: 97.7, 105/63, 80, 18care mgt working on SNF/need pt/ot notes from today for insurance authorization per care mgt notes today. Attending:  Back pain likely related to multiple compression fracture  -s/p Kyphoplast T6 and T10 by IR on 6/9  -MRI Thoracic on 6/7 acute mild/moderate compression fractures at T6 and T10. No evidence of extension into the posterior elements. No significant bulging/retropulsion, chronic compression fractures are noted at T8, T9, and T12, multilevel degenerative changes described above. -MRI Lumbar spine acute vertebral compression deformity along the superior endplate of T29 without cortical retropulsion or epidural hematoma.  mild multilevel disc and facet degenerative change with minimal   anterolisthesis of L5 on S1, mild canal and right foraminal stenosis at L4-5, mild bilateral foraminal stenoses L3-4.  - continue Tylenol and Tramadol for Pain   - continue PT/OT    Metabolic Encephalopathy superimposed on underlying mild dementia  -improved, more conscious and alert, answer simple questions  -CT Head no acute process  - likely related to dehydration, conscious and alert, confused  - continue with normal saline     Leukocytosis due to dehydration  -no signs of infection at this time  -resolved    Hypothyroidism  -continue with Synthroid    COPD   -stable  -SaO2 96% on RA, on oxygen prn 2 l/m  -continue pulmicort,prn duo neb  Dementia  -continue supportive care                                   * Milestone                  Back Pain - Care Day 1 (6/11/2017) by David Salmon RN        Review Status Review Entered       Completed 6/12/2017       Details              Care Day: 1 Care Date: 6/11/2017 Level of Care: Inpatient Floor       Guideline Day 1        Level Of Care       (X) Floor              Clinical Status       (X) * Clinical Indications met [C]              Routes       (X) Oral hydration, diet as tolerated       6/12/2017 7:14 AM EDT by Fady Higginbotham         ivf at 50. .brovana nebs bid. .pulmicort nebs bid. ..heparin 5000u sq q8h. ..ultracet po q8h. ..                     Interventions       (X) Possible orthopedic or neurosurgical consultation              6/12/2017 7:14 AM EDT by Fady Higginbotham       Subject: Additional Clinical Information       6-11-17: upgraded to inpatientpatient s/p kyphoplasty on 6-9-17 as follows:  IMPRESSION:   1. Successful T6 and T10 kyphoplasty  2. The patient should be evaluated and treated for osteoporosis.   per attending today:  Back pain likely related to multiple compression fracture  -s/p Kyphoplast T6 and T10 by IR on 6/9  -MRI Thoracic on 6/7 acute mild/moderate compression fractures at T6 and T10. No evidence of extension into the posterior elements.  No significant bulging/retropulsion, chronic compression fractures are noted at T8, T9, and T12, multilevel degenerative changes described above. -MRI Lumbar spine acute vertebral compression deformity along the superior endplate of A13 without cortical retropulsion or epidural hematoma.  mild multilevel disc and facet degenerative change with minimal   anterolisthesis of L5 on S1, mild canal and right foraminal stenosis at L4-5, mild bilateral foraminal stenoses L3-4.  - continue Tylenol and Tramadol for Pain   - PT/OT        Metabolic Encephalopathy superimposed on underlying mild dementia  -CT Head no acute process  - likely related to dehydration, conscious and alert, confused  - continue with normal saline         Leukocytosis due to dehydration  -no signs of infection at this time  -resolved        Hypothyroidism  -continue with Synthroid        COPD   -stable  -SaO2 96% on RA  -continue pulmicort,prn duo neb      Disposition: SNF possible on 6/12per care mgt notes: working on SNF, will need auth for SNF                                   * Milestone                  Back Pain - Clinical Indications for Admission to Inpatient Care by Butch Yeung RN        Review Status Review Entered       Completed 6/12/2017       Details              Clinical Indications for Admission to Inpatient Care       Most Recent : Pao Mcintosh Most Recent Date: 6/12/2017 7:08 AM EDT       (X) Admission is indicated for 1 or more of the following  (1) (2) (3) (4) (5) (6) (7):          (X) Inpatient admission required [A] rather than observation care (see Back Pain: Observation           Care guideline as appropriate) because of 1 or more of the following  (13):             (X) Other condition, treatment, or monitoring requiring inpatient admission             6/12/2017 7:08 AM EDT by Pao Mcintosh               upgraded to inpatient status on 6-11-17, failed observation time.

## 2017-06-14 NOTE — PROGRESS NOTES
Deferred visit: Checked on patient x 2. Refused therapy/sittting up stating \"it hurts too much\". Will follow and continue to encourage activity.

## 2017-06-14 NOTE — PROGRESS NOTES
CM spoke with pt's son, Suleiman Reed via phone. Suleiman Reed was living with pt prior to her admission to hospital.  Pt's son would assist pt with meal prep, give pt her medications, provide transportation to MD appointments, and assist with bathing and dressing. Pt was able to stand and take a few steps to [facility] or chair with assist from her son. She was able to feed self. Pt's son would assist with paying bills. Suleiman Reed states that his mother has declined since she has been in the hospital and he is unable to provide the level of care she needs at home.   Sheridan Slaughter RN CRM

## 2017-06-14 NOTE — ROUTINE PROCESS
Bedside shift change report given to Geena TatumRN (oncoming nurse) by Awilda Hewitt RN(offgoing nurse). Report given with SBAR.

## 2017-06-14 NOTE — PROGRESS NOTES
Hospitalist Progress Note  Sofía Coffman MD  Office: [phone]  Cell: [phone]      Date of Service:  2017  NAME:  Edward Ba  :  1929  MRN:  [id number]      Admission Summary:   Edward Ba is a 80 y.o. female who presents with PMHx of COPD, Hypothyroidism, Hx of compression fracture, who was just seen at [facility] for back pain and released back home. Pt unable to provide any meaningful history due to confusion and probable underlying Dementia. Per records, ems was called for back pain. On arrival, patient covered with stool B/L LE and buttocks and in nails. Pt unable to recall if she had a fall and how long she was on the floor. Pt states she has back pain which is worse when she lays on the back, no radiation of pain. Pt unable to describe or quantify the pain. Pt lives with her son at home. No reports of fever, chills, syncope, nausea, vomiting, diarrhea, abdominal pain, chest pain, or sob. Interval history / Subjective:   Back pain better, she said she wants to go home,      Assessment & Plan:     Back pain likely related to multiple compression fracture  -s/p Kyphoplasty T6 and T10 by IR on   -MRI Thoracic on  acute mild/moderate compression fractures at T6 and T10. No evidence of extension into the posterior elements. No significant bulging/retropulsion, chronic compression fractures are noted at T8, T9, and T12, multilevel degenerative changes described above. -MRI Lumbar spine acute vertebral compression deformity along the superior endplate of Z91 without cortical retropulsion or epidural hematoma.  mild multilevel disc and facet degenerative change with minimal  anterolisthesis of L5 on S1, mild canal and right foraminal stenosis at L4-5, mild bilateral foraminal stenoses L3-4.  - continue Tylenol and Tramadol for Pain   - PT/OT      Metabolic Encephalopathy superimposed on underlying mild dementia  -CT Head no acute process  - likely related to dehydration, conscious and alert, confused  - improved with IV fluids, now stopped      Leukocytosis due to dehydration  -no signs of infection at this time  -resolved      Hypothyroidism  -continue with Synthroid     COPD   -stable  -SaO2 96% on RA  -continue pulmicort,prn duo neb  -Brandyn wheezes  -CXR air space disease  -Will start her on Zpack/short oral steroid    Obesity  -diet and weight loss program    Code status: DNR  DVT prophylaxis: heparin    Plan: Awaiting insurance auth before discharge    Care Plan discussed with: Patient/Family, Nurse and   Disposition: SNF      Hospital Problems  Date Reviewed: 5/3/2017          Codes Class Noted POA    Back pain ICD-10-CM: M54.9  ICD-9-CM: 724.5  6/11/2017 Unknown        Metabolic encephalopathy IEV-30-KUNAL: G93.41  ICD-9-CM: 348.31  6/6/2017 Yes        Rhabdomyolysis ICD-10-CM: M62.82  ICD-9-CM: 728.88  6/6/2017 Yes        Dehydration ICD-10-CM: E86.0  ICD-9-CM: 276.51  6/6/2017 Yes        * (Principal)Compression fracture of body of thoracic vertebra ([facility] [address] ICD-10-CM: M48.54XA  ICD-9-CM: 733.13  6/6/2017 Yes                Vital Signs:    Last 24hrs VS reviewed since prior progress note. Most recent are:  Visit Vitals    /89 (BP 1 Location: Right arm, BP Patient Position: At rest)    Pulse 86    Temp 98.2 °F (36.8 °C)    Resp 18    Ht 5' 1\" (1.549 m)    Wt 77.4 kg (170 lb 10.2 oz)    SpO2 96%    BMI 32.24 kg/m2       No intake or output data in the 24 hours ending 06/14/17 1044     Physical Examination:             Constitutional:  No acute distress, cooperative, pleasant    ENT:  Oral mucous moist, oropharynx benign. Neck supple,    Resp:  CTA bilaterally. No wheezing/rhonchi/rales. No accessory muscle use   CV:  Regular rhythm, normal rate, no murmurs, gallops, rubs    GI:  Soft, non distended, non tender.  normoactive bowel sounds, no hepatosplenomegaly     Musculoskeletal:  No edema    Neurologic: Conscious and alert, answer simple questions and follows simple commands, motor UE 5/5, LE 4/5     Skin:  no [facility]       Data Review:    Review and/or order of clinical lab test      Labs:     No results for input(s): WBC, HGB, HCT, PLT, HGBEXT, HCTEXT, PLTEXT, HGBEXT, HCTEXT, PLTEXT in the last 72 hours. No results for input(s): NA, K, CL, CO2, BUN, CREA, GLU, CA, MG, PHOS, URICA in the last 72 hours. No results for input(s): SGOT, GPT, ALT, AP, TBIL, TBILI, TP, ALB, GLOB, GGT, AML, LPSE in the last 72 hours. No lab exists for component: AMYP, HLPSE  No results for input(s): INR, PTP, APTT in the last 72 hours. No lab exists for component: INREXT, INREXT   No results for input(s): FE, TIBC, PSAT, FERR in the last 72 hours. Lab Results   Component Value Date/Time    Folate 17.7 06/10/2016 04:33 PM      No results for input(s): PH, PCO2, PO2 in the last 72 hours. No results for input(s): CPK, CKNDX, TROIQ in the last 72 hours.     No lab exists for component: CPKMB  Lab Results   Component Value Date/Time    Cholesterol, total 174 06/10/2016 04:33 PM    HDL Cholesterol 62 06/10/2016 04:33 PM    LDL, calculated 86 06/10/2016 04:33 PM    Triglyceride 128 06/10/2016 04:33 PM     Lab Results   Component Value Date/Time    Glucose (POC) 146 10/10/2014 11:45 AM    Glucose (POC) 176 10/10/2014 07:24 AM    Glucose (POC) 194 10/09/2014 09:17 PM    Glucose (POC) 185 10/09/2014 06:50 PM     Lab Results   Component Value Date/Time    Color DARK YELLOW 06/06/2017 01:05 PM    Appearance CLOUDY 06/06/2017 01:05 PM    Specific gravity 1.030 06/06/2017 01:05 PM    Specific gravity 1.030 05/11/2017 01:59 PM    pH (UA) 6.0 06/06/2017 01:05 PM    Protein 30 06/06/2017 01:05 PM    Glucose NEGATIVE  06/06/2017 01:05 PM    Ketone 40 06/06/2017 01:05 PM    Bilirubin NEGATIVE  10/09/2014 02:17 PM    Urobilinogen 1.0 06/06/2017 01:05 PM    Nitrites NEGATIVE  06/06/2017 01:05 PM    Leukocyte Esterase NEGATIVE  06/06/2017 01:05 PM Epithelial cells FEW 06/06/2017 01:05 PM    Bacteria NEGATIVE  06/06/2017 01:05 PM    WBC 0-4 06/06/2017 01:05 PM    RBC 0-5 06/06/2017 01:05 PM         Medications Reviewed:     Current Facility-Administered Medications   Medication Dose Route Frequency    docusate sodium (COLACE) capsule 200 mg  200 mg Oral BID    polyethylene glycol (MIRALAX) packet 17 g  17 g Oral BID    sodium chloride (NS) flush 5-10 mL  5-10 mL IntraVENous Q8H    sodium chloride (NS) flush 5-10 mL  5-10 mL IntraVENous PRN    albuterol-ipratropium (DUO-NEB) 2.5 MG-0.5 MG/3 ML  3 mL Nebulization Q6H PRN    FLUoxetine (PROzac) capsule 10 mg  10 mg Oral DAILY    levothyroxine (SYNTHROID) tablet 75 mcg  75 mcg Oral ACB    therapeutic multivitamin (THERAGRAN) tablet 1 Tab  1 Tab Oral DAILY    nystatin (MYCOSTATIN) 100,000 unit/gram cream   Topical BID    sodium chloride (NS) flush 5-10 mL  5-10 mL IntraVENous Q8H    sodium chloride (NS) flush 5-10 mL  5-10 mL IntraVENous PRN    acetaminophen (TYLENOL) tablet 650 mg  650 mg Oral Q4H PRN    ondansetron (ZOFRAN) injection 4 mg  4 mg IntraVENous Q4H PRN    heparin (porcine) injection 5,000 Units  5,000 Units SubCUTAneous Q8H    pantoprazole (PROTONIX) tablet 40 mg  40 mg Oral ACB    arformoterol (BROVANA) neb solution 15 mcg  15 mcg Nebulization BID RT    And    budesonide (PULMICORT) 500 mcg/2 ml nebulizer suspension  500 mcg Nebulization BID RT     ______________________________________________________________________  EXPECTED LENGTH OF STAY: 5d 12h  ACTUAL LENGTH OF STAY:          4                 Lauren Valdes MD

## 2017-06-14 NOTE — PROGRESS NOTES
KERENAR received. Awaiting insurance review and authorization for SNF. Received call this pm from [facility] indicated insurance denied SNF. Contacted hospitalist who will ask for Peer to Peer with insurance MD; [phone] ext [phone]. Await decision. Contacted son , Kimani Salas, this pm. Left detailed message on his answering machine.   Hailey Casillas, DOTTIE

## 2017-06-15 PROCEDURE — 74011636637 HC RX REV CODE- 636/637: Performed by: HOSPITALIST

## 2017-06-15 PROCEDURE — 74011250636 HC RX REV CODE- 250/636: Performed by: HOSPITALIST

## 2017-06-15 PROCEDURE — 74011250637 HC RX REV CODE- 250/637: Performed by: HOSPITALIST

## 2017-06-15 PROCEDURE — 65270000029 HC RM PRIVATE

## 2017-06-15 PROCEDURE — 77010033678 HC OXYGEN DAILY

## 2017-06-15 PROCEDURE — 97535 SELF CARE MNGMENT TRAINING: CPT

## 2017-06-15 PROCEDURE — 94640 AIRWAY INHALATION TREATMENT: CPT

## 2017-06-15 PROCEDURE — 74011000250 HC RX REV CODE- 250: Performed by: HOSPITALIST

## 2017-06-15 PROCEDURE — 97530 THERAPEUTIC ACTIVITIES: CPT

## 2017-06-15 RX ADMIN — BUDESONIDE 500 MCG: 0.5 INHALANT RESPIRATORY (INHALATION) at 07:40

## 2017-06-15 RX ADMIN — PANTOPRAZOLE SODIUM 40 MG: 40 TABLET, DELAYED RELEASE ORAL at 06:34

## 2017-06-15 RX ADMIN — NYSTATIN: 100000 CREAM TOPICAL at 09:00

## 2017-06-15 RX ADMIN — THERA TABS 1 TABLET: TAB at 10:01

## 2017-06-15 RX ADMIN — Medication 10 ML: at 07:42

## 2017-06-15 RX ADMIN — POLYETHYLENE GLYCOL 3350 17 G: 17 POWDER, FOR SOLUTION ORAL at 18:04

## 2017-06-15 RX ADMIN — DOCUSATE SODIUM 200 MG: 100 CAPSULE, LIQUID FILLED ORAL at 10:01

## 2017-06-15 RX ADMIN — PREDNISONE 40 MG: 20 TABLET ORAL at 06:34

## 2017-06-15 RX ADMIN — ARFORMOTEROL TARTRATE 15 MCG: 15 SOLUTION RESPIRATORY (INHALATION) at 22:59

## 2017-06-15 RX ADMIN — Medication 10 ML: at 14:29

## 2017-06-15 RX ADMIN — Medication 10 ML: at 14:25

## 2017-06-15 RX ADMIN — BUDESONIDE 500 MCG: 0.5 INHALANT RESPIRATORY (INHALATION) at 22:59

## 2017-06-15 RX ADMIN — HEPARIN SODIUM 5000 UNITS: 5000 INJECTION, SOLUTION INTRAVENOUS; SUBCUTANEOUS at 06:34

## 2017-06-15 RX ADMIN — DOCUSATE SODIUM 200 MG: 100 CAPSULE, LIQUID FILLED ORAL at 18:04

## 2017-06-15 RX ADMIN — LEVOTHYROXINE SODIUM 75 MCG: 75 TABLET ORAL at 06:34

## 2017-06-15 RX ADMIN — AZITHROMYCIN 250 MG: 250 TABLET, FILM COATED ORAL at 10:01

## 2017-06-15 RX ADMIN — HEPARIN SODIUM 5000 UNITS: 5000 INJECTION, SOLUTION INTRAVENOUS; SUBCUTANEOUS at 14:25

## 2017-06-15 RX ADMIN — FLUOXETINE 10 MG: 10 CAPSULE ORAL at 10:01

## 2017-06-15 RX ADMIN — ARFORMOTEROL TARTRATE 15 MCG: 15 SOLUTION RESPIRATORY (INHALATION) at 07:40

## 2017-06-15 RX ADMIN — POLYETHYLENE GLYCOL 3350 17 G: 17 POWDER, FOR SOLUTION ORAL at 10:01

## 2017-06-15 NOTE — PROGRESS NOTES
NUTRITION COMPLETE ASSESSMENT    RECOMMENDATIONS:   1. Continue to assist with meal set-up and encourage PO   2. Could consider trial of appetite stimulant, if appropriate   3. Check vit B12 and vit D levels - B12 low normal 6/1/2016  4. Change TheraTab MV to THERA-M Plus with minerals (contains 9mg iron & 12mcg B12)     Interventions/Plan:   Food/Nutrient Delivery:   (snacks since dislikes supplements) Commercial supplement (none - did not like)          Assessment:   Reason for Assessment: [x]Reassessment    Diet: Mechanical soft  Supplements: none - dislikes  Nutritionally Significant Medications: [x] Reviewed & Includes: azithromycin, synthroid, protonix, miralax, prednisone, MVI; PRN: zofran  Meal Intake:   Patient Vitals for the past 100 hrs:   % Diet Eaten   06/15/17 0846 30 %   06/11/17 1206 20 %     Subjective: Confused/disoriented. Sleeping during visit. Spoke w/ RN who notes pt not feeling well today. Objective:  Pt admitted for compression rx. PMHx: hypothyroidism, COPD, RA, back pain, dementia. Very [place]. Insurance authorization pending for SNF placement per care mgmt note. Appetite poor on admit and continues (see above). Full dentures and claims can chew ok; No hx dysphagia. Does not like Ensure shakes. Likes: mashed potatoes and gravy, ham and cheese sandwich, and peas. Will trial some snacks between meals in hopes to improve intake. ?if poor intake natural decline with age/dementia. Please continue to encourage PO intake at meals with prompting. May consider trial of appetite stimulant if appropriate. Hx dementia and noted last B12 low normal 282 (6/1/2016). Please recheck and consider switch of MVI for higher B12 content. Extremely large wt discrepency this admit. Please zero bed and reweigh. 1-2+ BLLE noted but would not account for all the weight gain.    Last 3 Recorded Weights in this Encounter    06/06/17 1236 06/10/17 0641   Weight: 63.5 kg (140 lb) 77.4 kg (170 lb 10.2 oz)   UBW 140-145#. Slight loss ~5# (3%) x past month and net loss ~4% since March (~90 days). Pt at nutrition risk with poor appetite and no interest in eating. Estimated Nutrition Needs:   Kcals/day: 1200 Kcals/day  Protein: 65 g (65-70 gm (~1-1.1 gm/kg))  Fluid: 1500 ml (~30 mL/kg IBW)  Based On: Bibi Bowen (MSJ x 1.2 wt maintenance)  Weight Used: 63. kg admit     Nutrition Diagnosis:   1. Inadequate oral food/beverage intake related to poor appetite, dementia as evidenced by less than 30% meals consumed; pt hx    Goals:     Consume 75% all meals or consume minimum 100% Ensure BID     Monitoring & Evaluation:    - Liquid meal replacement, Total energy intake     Previous Nutrition Goals Met: No  Previous Recommendations:    No    Education & Discharge Needs:   [x] None Identified   [] Participated in care plan, discharge planning, and/or interdisciplinary rounds        Cultural, Latter day and ethnic food preferences identified: None    Skin Integrity: [x]Intact    Edema: [x] 1-2+ BLLE  Last BM: 6/14  Food Allergies: [x]NKFA    Anthropometrics:    Weight Loss Metrics 6/10/2017 5/11/2017 5/3/2017 3/3/2017 6/10/2016 12/3/2015 10/17/2014   Today's Wt 170 lb 10.2 oz 145 lb 145 lb 145 lb 12.8 oz - - 155 lb   BMI 32.24 kg/m2 27.4 kg/m2 27.4 kg/m2 27.55 kg/m2 - - 29.3 kg/m2      Last 3 Recorded Weights in this Encounter    06/06/17 1236 06/10/17 0641   Weight: 63.5 kg (140 lb) 77.4 kg (170 lb 10.2 oz)      Weight Source: Bed  Height: 5' 1\" (154.9 cm),    Body mass index is 32.24 kg/(m^2).   IBW : 47.6 kg (105 lb),    Usual Body Weight:  (Unknown (Wheelchair/bed bound)),      Labs:    Lab Results   Component Value Date/Time    Sodium 140 06/07/2017 03:33 AM    Potassium 3.5 06/07/2017 03:33 AM    Chloride 106 06/07/2017 03:33 AM    CO2 27 06/07/2017 03:33 AM    Glucose 82 06/07/2017 03:33 AM    BUN 10 06/07/2017 03:33 AM    Creatinine 0.70 06/07/2017 03:33 AM    Calcium 8.7 06/07/2017 03:33 AM    Albumin 3.1 06/06/2017 01:05 PM     Kristina Martin RD

## 2017-06-15 NOTE — PROGRESS NOTES
CM spoke with Hospitalist who stated that he called [facility] they told him that they needed PT/OT notes. PT/OT have completed new therapy notes today and they were sent to [facility], so they could submitted therapy notes for the insurance company to review.   Huy Benavides, ARTURW, ACM

## 2017-06-15 NOTE — PROGRESS NOTES
Problem: Mobility Impaired (Adult and Pediatric)  Goal: *Acute Goals and Plan of Care (Insert Text)  Physical Therapy Goals  Initiated 6/7/2017  1. Patient will move from supine to sit and sit to supine in bed with supervision/set-up within 7 day(s). 2. Patient will transfer from bed to chair and chair to bed with contact guard assist using the least restrictive device within 7 day(s). 3. Patient will perform sit to stand with contact guard assist within 7 day(s). 4. Patient will ambulate with minimal assistance for 10 feet with the least restrictive device within 7 day(s). PHYSICAL THERAPY TREATMENT  Patient: Lily Gunn (6 y.o. female)  Date: 6/15/2017  Diagnosis: Compression fracture of body of thoracic vertebra (HCC)  Dehydration  Rhabdomyolysis  Metabolic encephalopathy  Compression fracture of body of thoracic vertebra (HCC)  Back pain  Metabolic encephalopathy Compression fracture of body of thoracic vertebra (HCC)       Precautions: Fall, Back, DNR (thoracic kypho 6-1-17; 2L O2 PTA)      ASSESSMENT:  Patient received in sidelying with soiled brief and linens. Initially attempting to decline therapy, but open to allowing therapists to assist her with bed mobility and hygiene. Patient required min-mod Ax1 for bed mobility due to pain and need for cues with log roll. Upon sitting EOB, she had difficulty scooting self to EOB. At baseline she performs transfers and few feet of gait with assist x1 from her son, however today she required max Ax2 + RW for stand pivot to chair. She had B knee buckling and shuffled steps due to inability to clear feet in swing phase (LEs too weak to support self in stance phase). Patient left seated in chair and RN present at therapist exit. Highly recommend SNF. Patient unsafe to d/c home with assist x1 at this time. She will need to be sydnee lifted back to bed for maximum safety.    Progression toward goals:  [ ]    Improving appropriately and progressing toward goals  [X]    Improving slowly and progressing toward goals  [ ]    Not making progress toward goals and plan of care will be adjusted       PLAN:  Patient continues to benefit from skilled intervention to address the above impairments. Continue treatment per established plan of care. Discharge Recommendations:  [facility]  Further Equipment Recommendations for Discharge:  TBD at rehab       SUBJECTIVE:   Patient stated Oh no I need to LAY DOWN.      OBJECTIVE DATA SUMMARY:   Critical Behavior:  Neurologic State: Alert  Orientation Level: Oriented to person, Oriented to place, Disoriented to situation, Disoriented to time  Cognition: Follows commands, Impaired decision making  Safety/Judgement: Decreased insight into deficits, Fall prevention (not aware she had thoracic kyphoplasty 6-1-17)  Functional Mobility Training:  Bed Mobility:  Rolling: Moderate assistance  Supine to Sit: Minimum assistance  Transfers:  Sit to Stand: Minimum assistance; Moderate assistance  Bed to Chair: Maximum assistance;Assist x2; Additional time; Adaptive equipment (RW)  Balance:  Sitting: Impaired  Sitting - Static: Fair (occasional) (limited by back pain)  Sitting - Dynamic: Poor (constant support) (limited by back pain)  Standing: Impaired; With support  Standing - Static: Constant support; Fair (L LE buckle)  Standing - Dynamic : Poor (A x 2; recommend lift back to bed)  Pain:  Pain Scale 1: Numeric (0 - 10)  Pain Intensity 1: 0  Activity Tolerance:   Fair  Please refer to the flowsheet for vital signs taken during this treatment.   After treatment:   [X]    Patient left in no apparent distress sitting up in chair  [ ]    Patient left in no apparent distress in bed  [X]    Call bell left within reach  [X]    Nursing notified  [ ]    Caregiver present  [ ]    Bed alarm activated      COMMUNICATION/COLLABORATION:   The patients plan of care was discussed with: Registered Nurse, Physician and  and OT Ravin Jones, PT, DPT   Time Calculation: 22 mins

## 2017-06-15 NOTE — ROUTINE PROCESS
Bedside and Verbal shift change report [facility] (on[facility] nurse) by Mildred Lemon RN (offgoing nurse). Report included the following information SBAR, Kardex and MAR.

## 2017-06-15 NOTE — PROGRESS NOTES
Hospitalist Progress Note  Juan Ryan MD  Office: [phone]  Cell: [phone]      Date of Service:  6/15/2017  NAME:  Bella Carpenter  :  1929  MRN:  [id number]      Admission Summary:   Bella Carpenter is a 80 y.o. female who presents with PMHx of COPD, Hypothyroidism, Hx of compression fracture, who was just seen at [facility] for back pain and released back home. Pt unable to provide any meaningful history due to confusion and probable underlying Dementia. Per records, ems was called for back pain. On arrival, patient covered with stool B/L LE and buttocks and in nails. Pt unable to recall if she had a fall and how long she was on the floor. Pt states she has back pain which is worse when she lays on the back, no radiation of pain. Pt unable to describe or quantify the pain. Pt lives with her son at home. No reports of fever, chills, syncope, nausea, vomiting, diarrhea, abdominal pain, chest pain, or sob. Interval history / Subjective:   Back pain better       Assessment & Plan:     Back pain likely related to multiple compression fracture  -s/p Kyphoplasty T6 and T10 by IR on   -MRI Thoracic on  acute mild/moderate compression fractures at T6 and T10. No evidence of extension into the posterior elements. No significant bulging/retropulsion, chronic compression fractures are noted at T8, T9, and T12, multilevel degenerative changes described above. -MRI Lumbar spine acute vertebral compression deformity along the superior endplate of Q23 without cortical retropulsion or epidural hematoma.  mild multilevel disc and facet degenerative change with minimal  anterolisthesis of L5 on S1, mild canal and right foraminal stenosis at L4-5, mild bilateral foraminal stenoses L3-4.  - continue Tylenol and Tramadol for Pain   - PT/OT      Metabolic Encephalopathy superimposed on underlying mild dementia  -CT Head no acute process  - likely related to dehydration, conscious and alert, confused  - improved with IV fluids, now stopped      Leukocytosis due to dehydration  -no signs of infection at this time  -resolved      Hypothyroidism  -continue with Synthroid     COPD   -stable  -SaO2 96% on RA  -continue pulmicort,prn duo neb  -Brandyn wheezes  -CXR air space disease  -Continue Zpack/oral steroid    Obesity  -diet and weight loss program    Code status: DNR  DVT prophylaxis: heparin    Plan: Insurance denied rehab. Did [facility]. Insurance recommended to get detailed PT and OT comparing her baseline. After repeat PT/OT tried to reach insurance again but couldn't. Will call again tomorrow    Care Plan discussed with: Patient/Family, Nurse and   Disposition: home with home health vs SNF      Hospital Problems  Date Reviewed: 5/3/2017          Codes Class Noted POA    Back pain ICD-10-CM: M54.9  ICD-9-CM: 724.5  6/11/2017 Unknown        Metabolic encephalopathy TUD-60-HA: G93.41  ICD-9-CM: 348.31  6/6/2017 Yes        Rhabdomyolysis ICD-10-CM: M62.82  ICD-9-CM: 728.88  6/6/2017 Yes        Dehydration ICD-10-CM: E86.0  ICD-9-CM: 276.51  6/6/2017 Yes        * (Principal)Compression fracture of body of thoracic vertebra ([facility] [address] ICD-10-CM: M48.54XA  ICD-9-CM: 733.13  6/6/2017 Yes                Vital Signs:    Last 24hrs VS reviewed since prior progress note. Most recent are:  Visit Vitals    /74 (BP 1 Location: Left arm)    Pulse 87    Temp 98 °F (36.7 °C)    Resp 18    Ht 5' 1\" (1.549 m)    Wt 77.4 kg (170 lb 10.2 oz)    SpO2 97%    BMI 32.24 kg/m2       No intake or output data in the 24 hours ending 06/15/17 0940     Physical Examination:             Constitutional:  No acute distress, cooperative, pleasant    ENT:  Oral mucous moist, oropharynx benign. Neck supple,    Resp:  CTA bilaterally. No wheezing/rhonchi/rales.  No accessory muscle use   CV:  Regular rhythm, normal rate, no murmurs, gallops, rubs    GI:  Soft, non distended, non tender. normoactive bowel sounds, no hepatosplenomegaly     Musculoskeletal:  No edema    Neurologic:  Conscious and alert, answer simple questions and follows simple commands, motor UE 5/5, LE 4/5     Skin:  no ra       Data Review:    Review and/or order of clinical lab test      Labs:     No results for input(s): WBC, HGB, HCT, PLT, HGBEXT, HCTEXT, PLTEXT, HGBEXT, HCTEXT, PLTEXT in the last 72 hours. No results for input(s): NA, K, CL, CO2, BUN, CREA, GLU, CA, MG, PHOS, URICA in the last 72 hours. No results for input(s): SGOT, GPT, ALT, AP, TBIL, TBILI, TP, ALB, GLOB, GGT, AML, LPSE in the last 72 hours. No lab exists for component: AMYP, HLPSE  No results for input(s): INR, PTP, APTT in the last 72 hours. No lab exists for component: INREXT, INREXT   No results for input(s): FE, TIBC, PSAT, FERR in the last 72 hours. Lab Results   Component Value Date/Time    Folate 17.7 06/10/2016 04:33 PM      No results for input(s): PH, PCO2, PO2 in the last 72 hours. No results for input(s): CPK, CKNDX, TROIQ in the last 72 hours.     No lab exists for component: CPKMB  Lab Results   Component Value Date/Time    Cholesterol, total 174 06/10/2016 04:33 PM    HDL Cholesterol 62 06/10/2016 04:33 PM    LDL, calculated 86 06/10/2016 04:33 PM    Triglyceride 128 06/10/2016 04:33 PM     Lab Results   Component Value Date/Time    Glucose (POC) 146 10/10/2014 11:45 AM    Glucose (POC) 176 10/10/2014 07:24 AM    Glucose (POC) 194 10/09/2014 09:17 PM    Glucose (POC) 185 10/09/2014 06:50 PM     Lab Results   Component Value Date/Time    Color DARK YELLOW 06/06/2017 01:05 PM    Appearance CLOUDY 06/06/2017 01:05 PM    Specific gravity 1.030 06/06/2017 01:05 PM    Specific gravity 1.030 05/11/2017 01:59 PM    pH (UA) 6.0 06/06/2017 01:05 PM    Protein 30 06/06/2017 01:05 PM    Glucose NEGATIVE  06/06/2017 01:05 PM    Ketone 40 06/06/2017 01:05 PM    Bilirubin NEGATIVE  10/09/2014 02:17 PM    Urobilinogen 1.0 06/06/2017 01:05 PM    Nitrites NEGATIVE  06/06/2017 01:05 PM    Leukocyte Esterase NEGATIVE  06/06/2017 01:05 PM    Epithelial cells FEW 06/06/2017 01:05 PM    Bacteria NEGATIVE  06/06/2017 01:05 PM    WBC 0-4 06/06/2017 01:05 PM    RBC 0-5 06/06/2017 01:05 PM         Medications Reviewed:     Current Facility-Administered Medications   Medication Dose Route Frequency    azithromycin (ZITHROMAX) tablet 250 mg  250 mg Oral DAILY    predniSONE (DELTASONE) tablet 40 mg  40 mg Oral DAILY WITH BREAKFAST    traMADol (ULTRAM) tablet 50 mg  50 mg Oral Q6H PRN    docusate sodium (COLACE) capsule 200 mg  200 mg Oral BID    polyethylene glycol (MIRALAX) packet 17 g  17 g Oral BID    sodium chloride (NS) flush 5-10 mL  5-10 mL IntraVENous Q8H    sodium chloride (NS) flush 5-10 mL  5-10 mL IntraVENous PRN    albuterol-ipratropium (DUO-NEB) 2.5 MG-0.5 MG/3 ML  3 mL Nebulization Q6H PRN    FLUoxetine (PROzac) capsule 10 mg  10 mg Oral DAILY    levothyroxine (SYNTHROID) tablet 75 mcg  75 mcg Oral ACB    therapeutic multivitamin (THERAGRAN) tablet 1 Tab  1 Tab Oral DAILY    nystatin (MYCOSTATIN) 100,000 unit/gram cream   Topical BID    sodium chloride (NS) flush 5-10 mL  5-10 mL IntraVENous Q8H    sodium chloride (NS) flush 5-10 mL  5-10 mL IntraVENous PRN    acetaminophen (TYLENOL) tablet 650 mg  650 mg Oral Q4H PRN    ondansetron (ZOFRAN) injection 4 mg  4 mg IntraVENous Q4H PRN    heparin (porcine) injection 5,000 Units  5,000 Units SubCUTAneous Q8H    pantoprazole (PROTONIX) tablet 40 mg  40 mg Oral ACB    arformoterol (BROVANA) neb solution 15 mcg  15 mcg Nebulization BID RT    And    budesonide (PULMICORT) 500 mcg/2 ml nebulizer suspension  500 mcg Nebulization BID RT     ______________________________________________________________________  EXPECTED LENGTH OF STAY: 5d 12h  ACTUAL LENGTH OF STAY:          Meagan Westbrook [address], MD

## 2017-06-15 NOTE — PROGRESS NOTES
Problem: Self Care Deficits Care Plan (Adult)  Goal: *Acute Goals and Plan of Care (Insert Text)  Occupational Therapy Goals  Initiated 6/7/2017 Goals reviewed 6-15; cont x 7 days  1. Patient will perform grooming seated EOB for 5 minutes unsupported with supervision/set-up within 7 day(s). 2. Patient will perform UB bathing seated with minimal assistance/contact guard assist within 7 day(s). 3. Patient will perform toilet transfers to [address] [place] [address] with maximal assistance within 7 day(s). 4. Patient will perform all aspects of toileting with moderate assistance within 7 day(s). 5. Patient will participate in upper extremity therapeutic exercise/activities with supervision/set-up for 10 minutes within 7 day(s). 6. Patient will utilize energy conservation techniques during functional activities with verbal cues within 7 day(s). OCCUPATIONAL THERAPY TREATMENT: WEEKLY REASSESSMENT  Patient: Lily Gunn [de-identified]80 y.o. female)  Date: 6/15/2017  Diagnosis: Compression fracture of body of thoracic vertebra (HCC)  Dehydration  Rhabdomyolysis  Metabolic encephalopathy  Compression fracture of body of thoracic vertebra (HCC)  Back pain  Metabolic encephalopathy Compression fracture of body of thoracic vertebra (HCC)       Precautions: Fall, Back, DNR (thoracic kypho 6-1-17; 2L O2 PTA)  Chart, occupational therapy assessment, plan of care, and goals were reviewed. ASSESSMENT:  Patient has declined over past week, limited by 5/10 back pain and decreased activity tolerance. Patient initially was min A x 1-2 to transfer, today max A x 2, with LE buckle, L worse than R. Patient is quite fearful of falling during transfer and pain while sitting up, frequently requesting to be laid back down. Patient is incontinent of B&B and unaware. O2 sats stable while sitting on 2L.  Patient will benefit from SNF level rehab before return to home to maximize I with self care and functional mobility, improve pain management and build functional endurance. Progression toward goals:  [ ]          Improving appropriately and progressing toward goals  [X]          Improving slowly and progressing toward goals  [ ]          Not making progress toward goals and plan of care will be adjusted       PLAN:  Goals have been updated based on progression since last assessment. Patient continues to benefit from skilled intervention to address the above impairments. Continue to follow patient 5 times a week to address goals. Planned Interventions:  [X]                  Self Care Training                  [X]           Therapeutic Activities  [X]                  Functional Mobility Training    [X]           Cognitive Retraining  [X]                  Therapeutic Exercises           [X]           Endurance Activities  [X]                  Balance Training                   [ ]           Neuromuscular Re-Education  [ ]                  Visual/Perceptual Training     [X]      Home Safety Training  [X]                  Patient Education                 [X]           Family Training/Education  [ ]                  Other (comment):  Discharge Recommendations: Skilled Nursing Facility  Further Equipment Recommendations for Discharge: TBA       SUBJECTIVE:   Patient stated \"I need to lay down.       OBJECTIVE DATA SUMMARY:   Cognitive/Behavioral Status:  Neurologic State: Alert  Orientation Level: Oriented to person;Oriented to place; Disoriented to situation;Disoriented to time  Cognition: Follows commands; Impaired decision making  Perception: Appears intact  Perseveration: Perseverates during ADLS (\"I need to lay down\")  Safety/Judgement: Decreased insight into deficits; Fall prevention (not aware she had thoracic kyphoplasty 6-1-17)  Functional Mobility and Transfers for ADLs:              Bed Mobility:  Rolling: Moderate assistance  Supine to Sit: Minimum assistance           Transfers:  Sit to Stand: Minimum assistance; Moderate assistance Functional Transfers  Bathroom Mobility: Dependent/total assistance     Balance:  Sitting: Impaired  Sitting - Static: Fair (occasional) (limited by back pain)  Sitting - Dynamic: Poor (constant support) (limited by back pain)  Standing: Impaired; With support  Standing - Static: Constant support; Fair (L LE buckle)  Standing - Dynamic : Poor (A x 2; recommend lift back to bed)  ADL Intervention:                    Lower Body Bathing  Bathing Assistance: Maximum assistance (inferred)           Lower Body Dressing Assistance  Dressing Assistance: Total assistance(dependent) (limited by pain)     Toileting  Toileting Assistance: Total assistance(dependent) (incontinent of large diarrhea and unaware; mucusy; nsg notif)     Cognitive Retraining  Safety/Judgement: Decreased insight into deficits; Fall prevention (not aware she had thoracic kyphoplasty 6-1-17)              Pain:  Pain Scale 1: Numeric (0 - 10)  Pain Intensity 1: 0   5/10 at rest in sidelying; 5/10 in sitting, back thoracic region            Activity Tolerance:    Has declined over past week as she has frequently declined to get out of bed; training today emphasized physical need to sit up more frequently; recommend patient to sit up for meals, with Lift team PRN  Please refer to the flowsheet for vital signs taken during this treatment.   After treatment:   [X] Patient left in no apparent distress sitting up in chair  [ ] Patient left in no apparent distress in bed  [X] Call bell left within reach  [X] Nursing notified  [ ] Caregiver present  [ ] Bed alarm activated      COMMUNICATION/COLLABORATION:   The patients plan of care was discussed with: Physical Therapist and Registered Nurse     Gerson Bhatt OTR/L  Time Calculation: 25 mins

## 2017-06-16 VITALS
DIASTOLIC BLOOD PRESSURE: 66 MMHG | OXYGEN SATURATION: 98 % | TEMPERATURE: 98.5 F | RESPIRATION RATE: 16 BRPM | SYSTOLIC BLOOD PRESSURE: 119 MMHG | HEART RATE: 80 BPM | WEIGHT: 170.64 LBS | BODY MASS INDEX: 32.22 KG/M2 | HEIGHT: 61 IN

## 2017-06-16 PROCEDURE — 74011000250 HC RX REV CODE- 250: Performed by: HOSPITALIST

## 2017-06-16 PROCEDURE — 74011250637 HC RX REV CODE- 250/637: Performed by: HOSPITALIST

## 2017-06-16 PROCEDURE — 74011636637 HC RX REV CODE- 636/637: Performed by: HOSPITALIST

## 2017-06-16 PROCEDURE — 94640 AIRWAY INHALATION TREATMENT: CPT

## 2017-06-16 PROCEDURE — 74011250636 HC RX REV CODE- 250/636: Performed by: HOSPITALIST

## 2017-06-16 RX ORDER — LEVOFLOXACIN 750 MG/1
750 TABLET ORAL DAILY
Qty: 7 TAB | Refills: 0 | Status: SHIPPED | OUTPATIENT
Start: 2017-06-16

## 2017-06-16 RX ORDER — PREDNISONE 10 MG/1
40 TABLET ORAL
Qty: 30 TAB | Refills: 0 | Status: SHIPPED | OUTPATIENT
Start: 2017-06-16

## 2017-06-16 RX ORDER — SAME BUTANEDISULFONATE/BETAINE 400-600 MG
250 POWDER IN PACKET (EA) ORAL 2 TIMES DAILY
Qty: 14 CAP | Refills: 0 | Status: SHIPPED | OUTPATIENT
Start: 2017-06-16 | End: 2017-06-23

## 2017-06-16 RX ORDER — TRAMADOL HYDROCHLORIDE 50 MG/1
50 TABLET ORAL
Qty: 20 TAB | Refills: 0 | Status: SHIPPED | OUTPATIENT
Start: 2017-06-16

## 2017-06-16 RX ADMIN — THERA TABS 1 TABLET: TAB at 09:01

## 2017-06-16 RX ADMIN — ARFORMOTEROL TARTRATE 15 MCG: 15 SOLUTION RESPIRATORY (INHALATION) at 09:03

## 2017-06-16 RX ADMIN — HEPARIN SODIUM 5000 UNITS: 5000 INJECTION, SOLUTION INTRAVENOUS; SUBCUTANEOUS at 06:29

## 2017-06-16 RX ADMIN — LEVOTHYROXINE SODIUM 75 MCG: 75 TABLET ORAL at 06:30

## 2017-06-16 RX ADMIN — PREDNISONE 40 MG: 20 TABLET ORAL at 06:29

## 2017-06-16 RX ADMIN — AZITHROMYCIN 250 MG: 250 TABLET, FILM COATED ORAL at 09:01

## 2017-06-16 RX ADMIN — NYSTATIN: 100000 CREAM TOPICAL at 09:00

## 2017-06-16 RX ADMIN — FLUOXETINE 10 MG: 10 CAPSULE ORAL at 09:01

## 2017-06-16 RX ADMIN — PANTOPRAZOLE SODIUM 40 MG: 40 TABLET, DELAYED RELEASE ORAL at 06:30

## 2017-06-16 RX ADMIN — BUDESONIDE 500 MCG: 0.5 INHALANT RESPIRATORY (INHALATION) at 09:03

## 2017-06-16 NOTE — PROGRESS NOTES
Spiritual Care Assessment/Progress Notes    Sukh Quiroz [id number]  [id number]    9/18/1929  80 y.o.  female    Patient Telephone Number: [phone] (home)   Cheondoism Affiliation: Martha Oliveira   Language: English   Extended Emergency Contact Information  Primary Emergency Contact: [address] [facility]  Mobile Phone: [phone]  Relation: Son  Secondary Emergency Contact: [address] [facility] Phone: [phone]  Relation: Son   Patient Active Problem List    Diagnosis Date Noted    Back pain 72/02/6383    Metabolic encephalopathy 73/44/0773    Rhabdomyolysis 06/06/2017    Dehydration 06/06/2017    Compression fracture of body of thoracic vertebra ([facility] 06/06/2017    Bowel and bladder incontinence 05/03/2017    History of anemia 05/03/2017    COPD (chronic obstructive pulmonary disease) ([facility] 12/03/2015    History of pneumonia 12/03/2015    Rheumatoid arthritis ([facility] 07/01/2011    Hypothyroidism 07/01/2011    Depression 07/01/2011    Dementia 07/01/2011        Date: 6/16/2017       Level of Cheondoism/Spiritual Activity:  []         Involved in oswaldo tradition/spiritual practice    [x]         Not involved in oswaldo tradition/spiritual practice  []         Spiritually oriented    []         Claims no spiritual orientation    []         seeking spiritual identity  []         Feels alienated from Zoroastrian practice/tradition  []         Feels angry about Zoroastrian practice/tradition  []         Spirituality/Zoroastrian tradition  a resource for coping at this time.   []         Not able to assess due to medical condition    Services Provided Today:  []         crisis intervention    []         reading Scriptures  [x]         spiritual assessment    []         prayer  [x]         empathic listening/emotional support  []         rites and rituals (cite in comments)  []         life review     []         Zoroastrian support  []         theological development   [] advocacy  []         ethical dialog     []         blessing  []         bereavement support    []         support to family  []         anticipatory grief support   []         help with AMD  []         spiritual guidance    []         meditation      Spiritual Care Needs  [x]         Emotional Support  []         Spiritual/Mormonism Care  []         Loss/Adjustment  []         Advocacy/Referral                /Ethics  []         No needs expressed at               this time  []         Other: (note in               comments)  [address]  []         Follow up visits with               pt/family  []         Provide materials  []         Schedule sacraments  []         Contact Community               Clergy  [x]         Follow up as needed  []         Other: (note in               comments)     Comments: Visited with Ms. Patino for initial spiritual assessment and to offer support. Pt was receptive to  visit. She shared some about her life - indicating that she lives with her son, but she wasn't sure if he had visited her. She shared that she is originally from [place], but lives here in [place] now. Explored if pt is connected with a particular oswaldo tradition, she indicated that she is not. Pt expressed some feelings of isolation as she feels her family does not visit or \"keep up with her. \"  Uncertain how long pt might remain in the hospital, but chaplains are available for continued support as needed. Will also make referral for Tier 2 Spiritual Care Partner if pt is not discharged soon.     Alta Dao, Palliative

## 2017-06-16 NOTE — DISCHARGE SUMMARY
Discharge Summary       PATIENT ID: Breanna Pitts  MRN: [id number]   YOB: 1929    DATE OF ADMISSION: 6/6/2017 12:39 PM    DATE OF DISCHARGE: 6/16/2017   PRIMARY CARE PROVIDER: Sepideh Pablo NP     ATTENDING PHYSICIAN: Dr Chris Sanabria  DISCHARGING PROVIDER: Chris Sanabria MD    To contact this individual call [phone] and ask the  to page. If unavailable ask to be transferred the Adult Hospitalist Department. CONSULTATIONS: IP CONSULT TO INTERVENTIONAL RADIOLOGY  IP CONSULT TO PALLIATIVE CARE - PROVIDER    PROCEDURES/SURGERIES: * No surgery found *    [address] COURSE:   Breanna Pitts is a 80 y.o. female who presents with PMHx of COPD, Hypothyroidism, Hx of compression fracture, who was just seen at [facility] for back pain and released back home. Pt unable to provide any meaningful history due to confusion and probable underlying Dementia. Per records, ems was called for back pain. On arrival, patient covered with stool B/L LE and buttocks and in nails. Pt unable to recall if she had a fall and how long she was on the floor. Pt states she has back pain which is worse when she lays on the back, no radiation of pain. Pt unable to describe or quantify the pain. Pt lives with her son at home. No reports of fever, chills, syncope, nausea, vomiting, diarrhea, abdominal pain, chest pain, or sob. Back pain likely related to multiple compression fracture  -s/p Kyphoplasty T6 and T10 by IR on 6/9  -MRI Thoracic on 6/7 acute mild/moderate compression fractures at T6 and T10. No evidence of extension into the posterior elements. No significant bulging/retropulsion, chronic compression fractures are noted at T8, T9, and T12, multilevel degenerative changes described above. -MRI Lumbar spine acute vertebral compression deformity along the superior endplate of U46 without cortical retropulsion or epidural hematoma.  mild multilevel disc and facet degenerative change with minimal  anterolisthesis of L5 on S1, mild canal and right foraminal stenosis at L4-5, mild bilateral foraminal stenoses L3-4.  - continue Tylenol and Tramadol for Pain   - PT/OT      Metabolic Encephalopathy superimposed on underlying mild dementia  -CT Head no acute process  - likely related to dehydration, conscious and alert, confused  - improved with IV fluids, now stopped      Leukocytosis due to dehydration  -no signs of infection at this time  -resolved      Hypothyroidism  -continue with Synthroid     COPD vs Pneumonia  -stable  -SaO2 96% on RA  -continue pulmicort,prn duo neb  -Brandyn wheezes  -CXR air space disease  -On Zpack/oral steroid at discharge. Will discharge her on 7 day course of LVQ  Outpatient follow up CXR    Obesity  -diet and weight loss program    Code status: DNR  DVT prophylaxis: heparin    PENDING TEST RESULTS:   At the time of discharge the following test results are still pending: none    FOLLOW UP APPOINTMENTS:    Follow-up Information     Follow up With Details Comments Contact Gris Owusu [address] [place] [address]  [phone]         ADDITIONAL CARE RECOMMENDATIONS:    DIET: Mechanical Soft Diet     TUBE FEEDING INSTRUCTIONS: none    OXYGEN / BiPAP SETTINGS: PRN Oxygen 2 l/m via nasal canula if SaO2 < 90% on RA    ACTIVITY: Activity as tolerated    WOUND CARE: none    EQUIPMENT needed: none      DISCHARGE MEDICATIONS:   See Medication Reconciliation Form      NOTIFY YOUR PHYSICIAN FOR ANY OF THE FOLLOWING:   Fever over 101 degrees for 24 hours. Chest pain, shortness of breath, fever, chills, nausea, vomiting, diarrhea, change in mentation, falling, weakness, bleeding. Severe pain or pain not relieved by medications. Or, any other signs or symptoms that you may have questions about.     DISPOSITION:    Home With:   OT  PT  HH  RN      X SNF/Inpatient Rehab/LTAC    Independent/assisted living    Hospice    Other:       PATIENT CONDITION AT DISCHARGE:     Functional status   x Poor     Deconditioned     Independent      Cognition     Lucid     Forgetful    x Dementia      Catheters/lines (plus indication)    Liu     PICC     PEG    x None      Code status     Full code    X DNR      PHYSICAL EXAMINATION AT DISCHARGE:   Refer to Progress Note    CHRONIC MEDICAL DIAGNOSES:  Problem List as of 6/16/2017  Date Reviewed: 5/3/2017          Codes Class Noted - Resolved    Back pain ICD-10-CM: M54.9  ICD-9-CM: 724.5  6/11/2017 - Present        Metabolic encephalopathy VDB-97-SO: G93.41  ICD-9-CM: 348.31  6/6/2017 - Present        Rhabdomyolysis ICD-10-CM: M62.82  ICD-9-CM: 728.88  6/6/2017 - Present        Dehydration ICD-10-CM: E86.0  ICD-9-CM: 276.51  6/6/2017 - Present        * (Principal)Compression fracture of body of thoracic vertebra ([facility] ICD-10-CM: M48.54XA  ICD-9-CM: 733.13  6/6/2017 - Present        Bowel and bladder incontinence ICD-10-CM: R32, R15.9  ICD-9-CM: 788.30, 787.60  5/3/2017 - Present        History of anemia ICD-10-CM: Z86.2  ICD-9-CM: V12.3  5/3/2017 - Present        COPD (chronic obstructive pulmonary disease) ([facility] ICD-10-CM: J44.9  ICD-9-CM: 889  12/3/2015 - Present        History of pneumonia ICD-10-CM: Z87.01  ICD-9-CM: V12.61  12/3/2015 - Present        Rheumatoid arthritis ([facility] ICD-10-CM: M06.9  ICD-9-CM: 714.0  7/1/2011 - Present        Hypothyroidism ICD-10-CM: E03.9  ICD-9-CM: 244.9  7/1/2011 - Present        Depression ICD-10-CM: F32.9  ICD-9-CM: 765  7/1/2011 - Present        Dementia ICD-10-CM: F03.90  ICD-9-CM: 294.20  7/1/2011 - Present    Overview Signed 7/1/2011  2:58 PM by Bella Macedo MD     mild             RESOLVED: Pneumonia ICD-10-CM: J18.9  ICD-9-CM: 816  10/9/2014 - 12/3/2015              Greater than 39 minutes were spent with the patient on counseling and coordination of care    Signed:   Juan Ryan MD  6/16/2017  11:51 AM

## 2017-06-16 NOTE — PROGRESS NOTES
Received notification of insurance giving auth for transition to [facility]. Scheduled [facility] to transport at 2:30 pm. NN was notified. Attempted to contact SAGE Lynn) this am.  Left detailed message re: pt's medical stability per MD and plan to transition to facility today.  Information to accompany pt:  Discharge instructions/karmariano/summary/UAI/MAR.  (SNF: [facility])  Bunny Morales

## 2017-06-16 NOTE — PROGRESS NOTES
Hospitalist Progress Note  Chris Lesches, MD  Office: [phone]  Cell: [phone]      Date of Service:  2017  NAME:  Lexus Hdez  :  1929  MRN:  [id number]      Admission Summary:   Lexus Hdez is a 80 y.o. female who presents with PMHx of COPD, Hypothyroidism, Hx of compression fracture, who was just seen at [facility] for back pain and released back home. Pt unable to provide any meaningful history due to confusion and probable underlying Dementia. Per records, ems was called for back pain. On arrival, patient covered with stool B/L LE and buttocks and in nails. Pt unable to recall if she had a fall and how long she was on the floor. Pt states she has back pain which is worse when she lays on the back, no radiation of pain. Pt unable to describe or quantify the pain. Pt lives with her son at home. No reports of fever, chills, syncope, nausea, vomiting, diarrhea, abdominal pain, chest pain, or sob. Interval history / Subjective:   Back pain better  \"My breathing is fine. \"       Assessment & Plan:     Back pain likely related to multiple compression fracture  -s/p Kyphoplasty T6 and T10 by IR on   -MRI Thoracic on  acute mild/moderate compression fractures at T6 and T10. No evidence of extension into the posterior elements. No significant bulging/retropulsion, chronic compression fractures are noted at T8, T9, and T12, multilevel degenerative changes described above. -MRI Lumbar spine acute vertebral compression deformity along the superior endplate of N51 without cortical retropulsion or epidural hematoma.  mild multilevel disc and facet degenerative change with minimal  anterolisthesis of L5 on S1, mild canal and right foraminal stenosis at L4-5, mild bilateral foraminal stenoses L3-4.  - continue Tylenol and Tramadol for Pain   - PT/OT      Metabolic Encephalopathy superimposed on underlying mild dementia  -CT Head no acute process  - likely related to dehydration, conscious and alert, confused  - improved with IV fluids, now stopped      Leukocytosis due to dehydration  -no signs of infection at this time  -resolved      Hypothyroidism  -continue with Synthroid     COPD vs Pneumonia  -stable  -SaO2 96% on RA  -continue pulmicort,prn duo neb  -Brandyn wheezes  -CXR air space disease  -On Zpack/oral steroid at discharge. Will discharge her on 7 day course of LVQ  Outpatient follow up CXR    Obesity  -diet and weight loss program    Code status: DNR  DVT prophylaxis: heparin    Plan: discharge to rehab today    Care Plan discussed with: Patient/Family, Nurse and   Disposition: SNF      Hospital Problems  Date Reviewed: 5/3/2017          Codes Class Noted POA    Back pain ICD-10-CM: M54.9  ICD-9-CM: 724.5  6/11/2017 Unknown        Metabolic encephalopathy FNQ-06-ZQ: G93.41  ICD-9-CM: 348.31  6/6/2017 Yes        Rhabdomyolysis ICD-10-CM: M62.82  ICD-9-CM: 728.88  6/6/2017 Yes        Dehydration ICD-10-CM: E86.0  ICD-9-CM: 276.51  6/6/2017 Yes        * (Principal)Compression fracture of body of thoracic vertebra ([facility] ICD-10-CM: M48.54XA  ICD-9-CM: 733.13  6/6/2017 Yes                Vital Signs:    Last 24hrs VS reviewed since prior progress note. Most recent are:  Visit Vitals    /66 (BP 1 Location: Left arm, BP Patient Position: At rest)    Pulse 80    Temp 98.5 °F (36.9 °C)    Resp 16    Ht 5' 1\" (1.549 m)    Wt 77.4 kg (170 lb 10.2 oz)    SpO2 98%    BMI 32.24 kg/m2         Intake/Output Summary (Last 24 hours) at 06/16/17 0836  Last data filed at 06/15/17 1321   Gross per 24 hour   Intake                5 ml   Output                0 ml   Net                5 ml        Physical Examination:             Constitutional:  No acute distress, cooperative, pleasant    ENT:  Oral mucous moist, oropharynx benign. Neck supple,    Resp:  CTA bilaterally. No wheezing/rhonchi/rales.  No accessory muscle use   CV: Regular rhythm, normal rate, no murmurs, gallops, rubs    GI:  Soft, non distended, non tender. normoactive bowel sounds, no hepatosplenomegaly     Musculoskeletal:  No edema    Neurologic:  Conscious and alert, answer simple questions and follows simple commands, motor UE 5/5, LE 4/5     Skin:  no [facility]       Data Review:    Review and/or order of clinical lab test      Labs:     No results for input(s): WBC, HGB, HCT, PLT, HGBEXT, HCTEXT, PLTEXT, HGBEXT, HCTEXT, PLTEXT in the last 72 hours. No results for input(s): NA, K, CL, CO2, BUN, CREA, GLU, CA, MG, PHOS, URICA in the last 72 hours. No results for input(s): SGOT, GPT, ALT, AP, TBIL, TBILI, TP, ALB, GLOB, GGT, AML, LPSE in the last 72 hours. No lab exists for component: AMYP, HLPSE  No results for input(s): INR, PTP, APTT in the last 72 hours. No lab exists for component: INREXT, INREXT   No results for input(s): FE, TIBC, PSAT, FERR in the last 72 hours. Lab Results   Component Value Date/Time    Folate 17.7 06/10/2016 04:33 PM      No results for input(s): PH, PCO2, PO2 in the last 72 hours. No results for input(s): CPK, CKNDX, TROIQ in the last 72 hours.     No lab exists for component: CPKMB  Lab Results   Component Value Date/Time    Cholesterol, total 174 06/10/2016 04:33 PM    HDL Cholesterol 62 06/10/2016 04:33 PM    LDL, calculated 86 06/10/2016 04:33 PM    Triglyceride 128 06/10/2016 04:33 PM     Lab Results   Component Value Date/Time    Glucose (POC) 146 10/10/2014 11:45 AM    Glucose (POC) 176 10/10/2014 07:24 AM    Glucose (POC) 194 10/09/2014 09:17 PM    Glucose (POC) 185 10/09/2014 06:50 PM     Lab Results   Component Value Date/Time    Color DARK YELLOW 06/06/2017 01:05 PM    Appearance CLOUDY 06/06/2017 01:05 PM    Specific gravity 1.030 06/06/2017 01:05 PM    Specific gravity 1.030 05/11/2017 01:59 PM    pH (UA) 6.0 06/06/2017 01:05 PM    Protein 30 06/06/2017 01:05 PM    Glucose NEGATIVE  06/06/2017 01:05 PM    Ketone 40 06/06/2017 01:05 PM    Bilirubin NEGATIVE  10/09/2014 02:17 PM    Urobilinogen 1.0 06/06/2017 01:05 PM    Nitrites NEGATIVE  06/06/2017 01:05 PM    Leukocyte Esterase NEGATIVE  06/06/2017 01:05 PM    Epithelial cells FEW 06/06/2017 01:05 PM    Bacteria NEGATIVE  06/06/2017 01:05 PM    WBC 0-4 06/06/2017 01:05 PM    RBC 0-5 06/06/2017 01:05 PM         Medications Reviewed:     Current Facility-Administered Medications   Medication Dose Route Frequency    azithromycin (ZITHROMAX) tablet 250 mg  250 mg Oral DAILY    predniSONE (DELTASONE) tablet 40 mg  40 mg Oral DAILY WITH BREAKFAST    traMADol (ULTRAM) tablet 50 mg  50 mg Oral Q6H PRN    docusate sodium (COLACE) capsule 200 mg  200 mg Oral BID    polyethylene glycol (MIRALAX) packet 17 g  17 g Oral BID    sodium chloride (NS) flush 5-10 mL  5-10 mL IntraVENous Q8H    sodium chloride (NS) flush 5-10 mL  5-10 mL IntraVENous PRN    albuterol-ipratropium (DUO-NEB) 2.5 MG-0.5 MG/3 ML  3 mL Nebulization Q6H PRN    FLUoxetine (PROzac) capsule 10 mg  10 mg Oral DAILY    levothyroxine (SYNTHROID) tablet 75 mcg  75 mcg Oral ACB    therapeutic multivitamin (THERAGRAN) tablet 1 Tab  1 Tab Oral DAILY    nystatin (MYCOSTATIN) 100,000 unit/gram cream   Topical BID    sodium chloride (NS) flush 5-10 mL  5-10 mL IntraVENous Q8H    sodium chloride (NS) flush 5-10 mL  5-10 mL IntraVENous PRN    acetaminophen (TYLENOL) tablet 650 mg  650 mg Oral Q4H PRN    ondansetron (ZOFRAN) injection 4 mg  4 mg IntraVENous Q4H PRN    heparin (porcine) injection 5,000 Units  5,000 Units SubCUTAneous Q8H    pantoprazole (PROTONIX) tablet 40 mg  40 mg Oral ACB    arformoterol (BROVANA) neb solution 15 mcg  15 mcg Nebulization BID RT    And    budesonide (PULMICORT) 500 mcg/2 ml nebulizer suspension  500 mcg Nebulization BID RT     ______________________________________________________________________  EXPECTED LENGTH OF STAY: 5d 12h  ACTUAL LENGTH OF STAY:          6                 Jt Maciel MD

## 2017-06-19 ENCOUNTER — PATIENT OUTREACH (OUTPATIENT)
Dept: FAMILY MEDICINE CLINIC | Age: 82
End: 2017-06-19

## 2017-06-19 RX ORDER — ALBUTEROL SULFATE 90 UG/1
AEROSOL, METERED RESPIRATORY (INHALATION)
Qty: 8.5 INHALER | Refills: 0 | Status: SHIPPED | OUTPATIENT
Start: 2017-06-19

## 2017-06-20 ENCOUNTER — EXTERNAL NURSING HOME DOCUMENTATION (OUTPATIENT)
Dept: INTERNAL MEDICINE CLINIC | Age: 82
End: 2017-06-20

## 2017-06-20 DIAGNOSIS — F32.A DEPRESSION, UNSPECIFIED DEPRESSION TYPE: ICD-10-CM

## 2017-06-20 DIAGNOSIS — J44.9 CHRONIC OBSTRUCTIVE PULMONARY DISEASE, UNSPECIFIED COPD TYPE (HCC): ICD-10-CM

## 2017-06-20 DIAGNOSIS — E03.1 CONGENITAL HYPOTHYROIDISM WITHOUT GOITER: ICD-10-CM

## 2017-06-20 DIAGNOSIS — G93.41 METABOLIC ENCEPHALOPATHY: Primary | ICD-10-CM

## 2017-06-20 DIAGNOSIS — F03.90 DEMENTIA WITHOUT BEHAVIORAL DISTURBANCE, UNSPECIFIED DEMENTIA TYPE: ICD-10-CM

## 2017-06-20 DIAGNOSIS — S22.000A COMPRESSION FRACTURE OF BODY OF THORACIC VERTEBRA (HCC): ICD-10-CM

## 2017-06-23 ENCOUNTER — TELEPHONE (OUTPATIENT)
Dept: FAMILY MEDICINE CLINIC | Age: 82
End: 2017-06-23

## 2017-06-23 NOTE — TELEPHONE ENCOUNTER
Nurse called and informed Selena Canada at [facility] that yes heather will follow pt and Dr Sabina Nieves will cosign orders

## 2017-06-23 NOTE — TELEPHONE ENCOUNTER
[facility]     [phone]    -  Wants to know if Fabian Early will follow the patient for [facility]

## 2017-06-27 ENCOUNTER — HOSPITAL ENCOUNTER (EMERGENCY)
Age: 82
Discharge: HOME | End: 2017-06-28
Attending: EMERGENCY MEDICINE
Payer: MEDICARE

## 2017-06-27 ENCOUNTER — APPOINTMENT (OUTPATIENT)
Dept: GENERAL RADIOLOGY | Age: 82
End: 2017-06-27
Attending: EMERGENCY MEDICINE
Payer: MEDICARE

## 2017-06-27 ENCOUNTER — TELEPHONE (OUTPATIENT)
Dept: FAMILY MEDICINE CLINIC | Age: 82
End: 2017-06-27

## 2017-06-27 ENCOUNTER — APPOINTMENT (OUTPATIENT)
Dept: CT IMAGING | Age: 82
End: 2017-06-27
Attending: EMERGENCY MEDICINE
Payer: MEDICARE
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
## 2017-06-27 DIAGNOSIS — F03.90 DEMENTIA WITHOUT BEHAVIORAL DISTURBANCE, UNSPECIFIED DEMENTIA TYPE: Primary | ICD-10-CM

## 2017-06-27 LAB
ALBUMIN SERPL BCP-MCNC: 2.8 G/DL (ref 3.5–5)
ALBUMIN/GLOB SERPL: 0.7 {RATIO} (ref 1.1–2.2)
ALP SERPL-CCNC: 97 U/L (ref 45–117)
ALT SERPL-CCNC: 20 U/L (ref 12–78)
ANION GAP BLD CALC-SCNC: 7 MMOL/L (ref 5–15)
APPEARANCE UR: CLEAR
AST SERPL W P-5'-P-CCNC: 19 U/L (ref 15–37)
BACTERIA URNS QL MICRO: NEGATIVE /HPF
BASOPHILS # BLD AUTO: 0 K/UL (ref 0–0.1)
BASOPHILS # BLD: 0 % (ref 0–1)
BILIRUB SERPL-MCNC: 0.5 MG/DL (ref 0.2–1)
BILIRUB UR QL: NEGATIVE
BUN SERPL-MCNC: 8 MG/DL (ref 6–20)
BUN/CREAT SERPL: 15 (ref 12–20)
CALCIUM SERPL-MCNC: 8.5 MG/DL (ref 8.5–10.1)
CHLORIDE SERPL-SCNC: 102 MMOL/L (ref 97–108)
CO2 SERPL-SCNC: 28 MMOL/L (ref 21–32)
COLOR UR: NORMAL
CREAT SERPL-MCNC: 0.53 MG/DL (ref 0.55–1.02)
DIFFERENTIAL METHOD BLD: ABNORMAL
EOSINOPHIL # BLD: 0.2 K/UL (ref 0–0.4)
EOSINOPHIL NFR BLD: 2 % (ref 0–7)
EPITH CASTS URNS QL MICRO: NORMAL /LPF
ERYTHROCYTE [DISTWIDTH] IN BLOOD BY AUTOMATED COUNT: 16.9 % (ref 11.5–14.5)
GLOBULIN SER CALC-MCNC: 3.9 G/DL (ref 2–4)
GLUCOSE SERPL-MCNC: 94 MG/DL (ref 65–100)
GLUCOSE UR STRIP.AUTO-MCNC: NEGATIVE MG/DL
HCT VFR BLD AUTO: 36.2 % (ref 35–47)
HGB BLD-MCNC: 11.4 G/DL (ref 11.5–16)
HGB UR QL STRIP: NEGATIVE
KETONES UR QL STRIP.AUTO: NEGATIVE MG/DL
LEUKOCYTE ESTERASE UR QL STRIP.AUTO: NEGATIVE
LYMPHOCYTES # BLD AUTO: 28 % (ref 12–49)
LYMPHOCYTES # BLD: 3.1 K/UL (ref 0.8–3.5)
MCH RBC QN AUTO: 27.2 PG (ref 26–34)
MCHC RBC AUTO-ENTMCNC: 31.5 G/DL (ref 30–36.5)
MCV RBC AUTO: 86.4 FL (ref 80–99)
MONOCYTES # BLD: 1.1 K/UL (ref 0–1)
MONOCYTES NFR BLD AUTO: 10 % (ref 5–13)
NEUTS SEG # BLD: 6.6 K/UL (ref 1.8–8)
NEUTS SEG NFR BLD AUTO: 60 % (ref 32–75)
NITRITE UR QL STRIP.AUTO: NEGATIVE
PH UR STRIP: 7 [PH] (ref 5–8)
PLATELET # BLD AUTO: 133 K/UL (ref 150–400)
POTASSIUM SERPL-SCNC: 3.6 MMOL/L (ref 3.5–5.1)
PROT SERPL-MCNC: 6.7 G/DL (ref 6.4–8.2)
PROT UR STRIP-MCNC: NEGATIVE MG/DL
RBC # BLD AUTO: 4.19 M/UL (ref 3.8–5.2)
RBC #/AREA URNS HPF: NORMAL /HPF (ref 0–5)
RBC MORPH BLD: ABNORMAL
RBC MORPH BLD: ABNORMAL
SODIUM SERPL-SCNC: 137 MMOL/L (ref 136–145)
SP GR UR REFRACTOMETRY: 1.01 (ref 1–1.03)
TROPONIN I SERPL-MCNC: <0.04 NG/ML
UROBILINOGEN UR QL STRIP.AUTO: 0.2 EU/DL (ref 0.2–1)
WBC # BLD AUTO: 11 K/UL (ref 3.6–11)
WBC URNS QL MICRO: NORMAL /HPF (ref 0–4)

## 2017-06-27 PROCEDURE — 70450 CT HEAD/BRAIN W/O DYE: CPT

## 2017-06-27 PROCEDURE — 99284 EMERGENCY DEPT VISIT MOD MDM: CPT

## 2017-06-27 PROCEDURE — 71101 X-RAY EXAM UNILAT RIBS/CHEST: CPT

## 2017-06-27 PROCEDURE — 73080 X-RAY EXAM OF ELBOW: CPT

## 2017-06-27 PROCEDURE — 80053 COMPREHEN METABOLIC PANEL: CPT | Performed by: EMERGENCY MEDICINE

## 2017-06-27 PROCEDURE — 73610 X-RAY EXAM OF ANKLE: CPT

## 2017-06-27 PROCEDURE — 73030 X-RAY EXAM OF SHOULDER: CPT

## 2017-06-27 PROCEDURE — 85025 COMPLETE CBC W/AUTO DIFF WBC: CPT | Performed by: EMERGENCY MEDICINE

## 2017-06-27 PROCEDURE — 84484 ASSAY OF TROPONIN QUANT: CPT | Performed by: EMERGENCY MEDICINE

## 2017-06-27 PROCEDURE — 36415 COLL VENOUS BLD VENIPUNCTURE: CPT | Performed by: EMERGENCY MEDICINE

## 2017-06-27 PROCEDURE — 73502 X-RAY EXAM HIP UNI 2-3 VIEWS: CPT

## 2017-06-27 PROCEDURE — 81001 URINALYSIS AUTO W/SCOPE: CPT | Performed by: EMERGENCY MEDICINE

## 2017-06-27 NOTE — Clinical Note
Work up in the emergency room was reassuring. Primary care is aware of her social issues and breast pain. Continue close contact and follow up with them.

## 2017-06-27 NOTE — TELEPHONE ENCOUNTER
Contact # is [phone]    Zbigniew Angelo, [facility] has approved patient's home health.  She is requesting a verbal order for skilled nursing, physical therapy, occupational therapy, and a medical social worker

## 2017-06-27 NOTE — TELEPHONE ENCOUNTER
Britta Lazaro  [phone]    Patient's son, Rose Noonan, states that [facility] [address] is coming to see his mom. She was sent home from rehab because she was \"totally uncooperative\". He states that his mom's dementia is getting worse. She often talks in her sleep. Mr. Riaz Henry states that his mother had major surgery two weeks ago to repair a compression fracture in her C-5. He states that it was corrected with \"medical cement\". This has left her in a lot of pain. Additionally, Mr. Riaz Henry states that his mother is in a lot of pain around her left breast.  She cannot lay down on that side. She has a rash under the breast which he puts Desitin cream on. He states that he lifts the breast to apply the cream.  He thinks that there is something wrong inside the breast - a mass or cyst.      Mr. Riaz Henry states that he cannot deal with his mother unless NP [place] can give her something to help with the pain. His main concern is the breast.  He wants to speak to LUDWIG Weaver or her nurse to discuss a possible mammogram for his mother.

## 2017-06-28 VITALS
OXYGEN SATURATION: 95 % | HEIGHT: 61 IN | TEMPERATURE: 98.1 F | BODY MASS INDEX: 32.1 KG/M2 | HEART RATE: 85 BPM | SYSTOLIC BLOOD PRESSURE: 105 MMHG | DIASTOLIC BLOOD PRESSURE: 59 MMHG | WEIGHT: 170 LBS | RESPIRATION RATE: 18 BRPM

## 2017-06-28 NOTE — TELEPHONE ENCOUNTER
Writer returned call to care giverer Magali Starling with [facility], left voicemail advising care giver of verbal orders. Advised care giver to call the office if any additional information is needed.

## 2017-06-28 NOTE — TELEPHONE ENCOUNTER
Writer returned call to patient's son, attempted to schedule patient today with LUDWIG Weaver. Mariamakaity Wadsworth advised he is not available today to bring his mother in for an office visit. No available upcoming appointments in our clinic. Message forwarded to PCP for review and advisements.

## 2017-06-28 NOTE — ED NOTES
[facility] arrived. Report and paperwork given to providers. Only belonging is a gown, also with providers.   Left via stretcher with stable VS

## 2017-06-28 NOTE — ED NOTES
Assumed care of patient with labs pending. All reassuring. Nurses note states left sided pain but xrays were all done on right. Bruising is on right knee. FROM all joints without localized pain, no gross deformity. Patient frog legged from cath without difficulty per RN. Son contacted and aware patient coming back. Primary care actively involved with patient and family.

## 2017-06-28 NOTE — TELEPHONE ENCOUNTER
Writer returned call to patient's son Lakisha Lutz, advised Mr. Patino of appointment scheduled for patient 07/07 with  for 12 PM. Michael Duran understood and agreed to appointment date and time.

## 2017-06-28 NOTE — DISCHARGE INSTRUCTIONS
Dementia: Care Instructions  Your Care Instructions  Dementia is a loss of mental skills that affects your daily life. It is different than the occasional trouble with memory that is part of aging. You may find it hard to remember things that you feel you should be able to remember. Or you may feel that your mind is just not working as well as usual.  Finding out that you have dementia is a shock. You may be afraid and worried about how the condition will change your life. Although there is no cure at this time, medicine may slow memory loss and improve thinking for a while. Other medicines may be able to help you sleep or cope with depression and behavior changes. Dementia often gets worse slowly. But it can get worse quickly. As dementia gets worse, it may become harder to do common things that take planning, like making a list and going shopping. Over time, the disease may make it hard for you to take care of yourself. Some people with dementia need others to help care for them. Dementia is different for everyone. You may be able to function well for a long time. In the early stage of the condition, you can do things at home to make life easier and safer. You also can keep doing your hobbies and other activities. Many people find comfort in planning now for their future needs. Follow-up care is a key part of your treatment and safety. Be sure to make and go to all appointments, and call your doctor if you are having problems. Its also a good idea to know your test results and keep a list of the medicines you take. How can you care for yourself at home? · Take your medicines exactly as prescribed. Call your doctor if you think you are having a problem with your medicine. · Eat healthy foods. Eat lots of whole grains, fruits, and vegetables every day.  If you are not hungry, try snacks or nutritional drinks such as Boost, Ensure, or Sustacal.  · If you have problems sleeping:  ¨ Try not to nap too close to your bedtime. ¨ Exercise regularly. Walking is a good choice. ¨ Try a glass of warm milk or caffeine-free herbal tea before bed. · Do tasks and activities during the time of day when you feel your best. It may help to develop a daily routine. · Post labels, lists, and sticky notes to help you remember things. Write your activities on a calendar you can easily find. Put your clock where you can easily see it. · Stay active. Take walks in familiar places, or with friends or loved ones. Try to stay active mentally too. Read and work crossword puzzles if you enjoy these activities. · Do not drive unless you can pass an on-road driving test. If you are not sure if you are safe to drive, your state s license bureau can test you. · Keep a cordless phone and a flashlight with new batteries by your bed. If possible, put a phone in each of the main rooms of your house, or carry a cell phone in case you fall and cannot reach a phone. Or, you can wear a device around your neck or wrist. You push a button that sends a signal for help. Acknowledge your emotions and plan for the future  · Talk openly and honestly with your doctor. · Let yourself grieve. It is common to feel angry, scared, frustrated, anxious, or depressed. · Get emotional support from family, friends, a support group, or a counselor experienced in working with people who have dementia. · Ask for help if you need it. · Plan for the future. ¨ Talk to your family and doctor about preparing a living will and other important papers while you can make decisions. These papers tell your doctors how to care for you at the end of your life. ¨ Consider naming a person to make decisions about your care if you are not able to. When should you call for help? Call 911 anytime you think you may need emergency care. For example, call if:  · You are lost and do not know whom to call. · You are injured and do not know whom to call.   Call your doctor now or seek immediate medical care if:  · You are more confused or upset than usual.  · You feel like you could hurt yourself because your mind is not working well. Watch closely for changes in your health, and be sure to contact your doctor if you have any problems. Where can you learn more? Go to http://sly-ariane.info/. Enter U175 in the search box to learn more about \"Dementia: Care Instructions. \"  Current as of: July 26, 2016  Content Version: 11.3  © 4357-3948 "[employer] Care instructions adapted under license by [employer] (which disclaims liability or warranty for this information). If you have questions about a medical condition or this instruction, always ask your healthcare professional. [address] any warranty or liability for your use of this information. Preventing Falls: Care Instructions  Your Care Instructions  Getting around your home safely can be a challenge if you have injuries or health problems that make it easy for you to fall. Loose rugs and furniture in walkways are among the dangers for many older people who have problems walking or who have poor eyesight. People who have conditions such as arthritis, osteoporosis, or dementia also have to be careful not to fall. You can make your home safer with a few simple measures. Follow-up care is a key part of your treatment and safety. Be sure to make and go to all appointments, and call your doctor if you are having problems. It's also a good idea to know your test results and keep a list of the medicines you take. How can you care for yourself at home? Taking care of yourself  · You may get dizzy if you do not drink enough water. To prevent dehydration, drink plenty of fluids, enough so that your urine is light yellow or clear like water. Choose water and other caffeine-free clear liquids.  If you have kidney, heart, or liver disease and have to limit fluids, talk with your doctor before you increase the amount of fluids you drink. · Exercise regularly to improve your strength, muscle tone, and balance. Walk if you can. Swimming may be a good choice if you cannot walk easily. · Have your vision and hearing checked each year or any time you notice a change. If you have trouble seeing and hearing, you might not be able to avoid objects and could lose your balance. · Know the side effects of the medicines you take. Ask your doctor or pharmacist whether the medicines you take can affect your balance. Sleeping pills or sedatives can affect your balance. · Limit the amount of alcohol you drink. Alcohol can impair your balance and other senses. · Ask your doctor whether calluses or corns on your feet need to be removed. If you wear loose-fitting shoes because of calluses or corns, you can lose your balance and fall. · Talk to your doctor if you have numbness in your feet. Preventing falls at home  · Remove raised doorway thresholds, throw rugs, and clutter. Repair loose carpet or raised areas in the floor. · Move furniture and electrical cords to keep them out of walking paths. · Use nonskid floor wax, and wipe up spills right away, especially on ceramic tile floors. · If you use a walker or cane, put rubber tips on it. If you use crutches, clean the bottoms of them regularly with an abrasive pad, such as steel wool. · Keep your house well lit, especially [place], and outside walkways. Use night-lights in areas such as hallways and bathrooms. Add extra light switches or use remote switches (such as switches that go on or off when you clap your hands) to make it easier to turn lights on if you have to get up during the night. · Install sturdy handrails on stairways. · Move items in your cabinets so that the things you use a lot are on the lower shelves (about waist level). · Keep a cordless phone and a flashlight with new batteries by your bed.  If possible, put a phone in each of the main rooms of your house, or carry a cell phone in case you fall and cannot reach a phone. Or, you can wear a device around your neck or wrist. You push a button that sends a signal for help. · Wear low-heeled shoes that fit well and give your feet good support. Use footwear with nonskid soles. Check the heels and soles of your shoes for wear. Repair or replace worn heels or soles. · Do not wear socks without shoes on wood floors. · Walk on the grass when the sidewalks are slippery. If you live in an area that gets snow and ice in the winter, sprinkle salt on slippery steps and sidewalks. Preventing falls in the bath  · Install grab bars and nonskid mats inside and outside your shower or tub and near the toilet and sinks. · Use shower chairs and bath benches. · Use a hand-held shower head that will allow you to sit while showering. · Get into a tub or shower by putting the weaker leg in first. Get out of a tub or shower with your strong side first.  · Repair loose toilet seats and consider installing a raised toilet seat to make getting on and off the toilet easier. · Keep your bathroom door unlocked while you are in the shower. Where can you learn more? Go to http://sly-ariane.info/. Enter 0476 [id number] in the search box to learn more about \"Preventing Falls: Care Instructions. \"  Current as of: August 4, 2016  Content Version: 11.3  © 2843-0110 [employer]. Care instructions adapted under license by [employer] (which disclaims liability or warranty for this information). If you have questions about a medical condition or this instruction, always ask your healthcare professional. [address] any warranty or liability for your use of this information. We hope that we have addressed all of your medical concerns.  The examination and treatment you received in the Emergency Department were for an emergent problem and were not intended as complete care. It is important that you follow up with your healthcare provider(s) for ongoing care. If your symptoms worsen or do not improve as expected, and you are unable to reach your usual health care provider(s), you should return to the Emergency Department. Today's healthcare is undergoing tremendous change, and patient satisfaction surveys are one of the many tools to assess the quality of medical care. You may receive a survey from the [employer] regarding your experience in the Emergency Department. I hope that your experience has been completely positive, particularly the medical care that I provided. As such, please participate in the survey; anything less than excellent does not meet my expectations or intentions. [facility] and [address] [place] [address] participate in nationally recognized quality of care measures. If your blood pressure is greater than 120/80, as reported below, we urge that you seek medical care to address the potential of high blood pressure, commonly known as hypertension. Hypertension can be hereditary or can be caused by certain medical conditions, pain, stress, or \"white coat syndrome. \"       Please make an appointment with your health care provider(s) for follow up of your Emergency Department visit. VITALS:   Patient Vitals for the past 8 hrs:   Temp Pulse Resp BP SpO2   06/28/17 0021 - 87 18 107/66 98 %   06/27/17 2245 - 82 22 96/61 98 %   06/27/17 2122 - - - - 96 %   06/27/17 2107 98.1 °F (36.7 °C) 92 24 137/76 96 %          Thank you for allowing us to provide you with medical care today. We realize that you have many choices for your emergency care needs. Please choose us in the future for any continued health care needs.       Yun Lancaster MD    [facility].   Office: [phone]            Recent Results (from the past 24 hour(s)) URINALYSIS W/MICROSCOPIC    Collection Time: 06/27/17 10:54 PM   Result Value Ref Range    Color YELLOW/STRAW      Appearance CLEAR CLEAR      Specific gravity 1.010 1.003 - 1.030      pH (UA) 7.0 5.0 - 8.0      Protein NEGATIVE  NEG mg/dL    Glucose NEGATIVE  NEG mg/dL    Ketone NEGATIVE  NEG mg/dL    Bilirubin NEGATIVE  NEG      Blood NEGATIVE  NEG      Urobilinogen 0.2 0.2 - 1.0 EU/dL    Nitrites NEGATIVE  NEG      Leukocyte Esterase NEGATIVE  NEG      WBC 0-4 0 - 4 /hpf    RBC 0-5 0 - 5 /hpf    Epithelial cells FEW FEW /lpf    Bacteria NEGATIVE  NEG /hpf   METABOLIC PANEL, COMPREHENSIVE    Collection Time: 06/27/17 10:54 PM   Result Value Ref Range    Sodium 137 136 - 145 mmol/L    Potassium 3.6 3.5 - 5.1 mmol/L    Chloride 102 97 - 108 mmol/L    CO2 28 21 - 32 mmol/L    Anion gap 7 5 - 15 mmol/L    Glucose 94 65 - 100 mg/dL    BUN 8 6 - 20 MG/DL    Creatinine 0.53 (L) 0.55 - 1.02 MG/DL    BUN/Creatinine ratio 15 12 - 20      GFR est AA >60 >60 ml/min/1.73m2    GFR est non-AA >60 >60 ml/min/1.73m2    Calcium 8.5 8.5 - 10.1 MG/DL    Bilirubin, total 0.5 0.2 - 1.0 MG/DL    ALT (SGPT) 20 12 - 78 U/L    AST (SGOT) 19 15 - 37 U/L    Alk. phosphatase 97 45 - 117 U/L    Protein, total 6.7 6.4 - 8.2 g/dL    Albumin 2.8 (L) 3.5 - 5.0 g/dL    Globulin 3.9 2.0 - 4.0 g/dL    A-G Ratio 0.7 (L) 1.1 - 2.2     CBC WITH AUTOMATED DIFF    Collection Time: 06/27/17 10:54 PM   Result Value Ref Range    WBC 11.0 3.6 - 11.0 K/uL    RBC 4.19 3.80 - 5.20 M/uL    HGB 11.4 (L) 11.5 - 16.0 g/dL    HCT 36.2 35.0 - 47.0 %    MCV 86.4 80.0 - 99.0 FL    MCH 27.2 26.0 - 34.0 PG    MCHC 31.5 30.0 - 36.5 g/dL    RDW 16.9 (H) 11.5 - 14.5 %    PLATELET 787 (L) 929 - 400 K/uL    NEUTROPHILS 60 32 - 75 %    LYMPHOCYTES 28 12 - 49 %    MONOCYTES 10 5 - 13 %    EOSINOPHILS 2 0 - 7 %    BASOPHILS 0 0 - 1 %    ABS. NEUTROPHILS 6.6 1.8 - 8.0 K/UL    ABS. LYMPHOCYTES 3.1 0.8 - 3.5 K/UL    ABS. MONOCYTES 1.1 (H) 0.0 - 1.0 K/UL    ABS.  EOSINOPHILS 0.2 0.0 - 0.4 K/UL    ABS. BASOPHILS 0.0 0.0 - 0.1 K/UL    DF SMEAR SCANNED      RBC COMMENTS ANISOCYTOSIS  1+        RBC COMMENTS POLYCHROMASIA  1+       TROPONIN I    Collection Time: 06/27/17 10:54 PM   Result Value Ref Range    Troponin-I, Qt. <0.04 <0.05 ng/mL       Xr Shoulder Rt Ap/lat Min 2 V    Result Date: 6/27/2017  EXAM:  XR SHOULDER RT AP/LAT MIN 2 V INDICATION:   fall. Arm bruising. COMPARISON: None. FINDINGS: Three views of the right shoulder demonstrate no fracture, dislocation or other acute abnormality. IMPRESSION:  No acute abnormality. Xr Elbow Rt Min 3 V    Result Date: 6/27/2017  EXAM:  XR ELBOW RT MIN 3 V INDICATION:   right elbow pain following ground-level fall. COMPARISON: None. FINDINGS: Three views of the right elbow demonstrate no fracture, dislocation, effusion or other acute abnormality. IMPRESSION: No acute abnormality. Xr Hip Rt W Or Wo Pelv 2-3 Vws    Result Date: 6/27/2017  EXAM:  XR HIP RT W OR WO PELV 2-3 VWS INDICATION:   fall. Ground-level fall. COMPARISON: None. FINDINGS: An AP view of the pelvis and a frogleg lateral view of the right hip demonstrate no fracture, dislocation or other acute abnormality. IMPRESSION:  No acute abnormality. Xr Ankle Rt Min 3 V    Result Date: 6/27/2017  EXAM:  XR ANKLE RT MIN 3 V INDICATION:  fall. Left ankle pain following ground-level fall. COMPARISON: None. FINDINGS: Three views of the right ankle demonstrate no fracture or disruption of the ankle mortise. There is no other acute osseous or articular abnormality. The soft tissues are within normal limits. IMPRESSION: No acute abnormality. Ct Head Wo Cont    Result Date: 6/27/2017  EXAM:  CT head without contrast. INDICATION: Fall. COMPARISON: CT head 6/6/2017 TECHNIQUE: Axial noncontrast head CT from foramen magnum to vertex. Coronal and sagittal reformatted images were obtained.  CT dose reduction was achieved through use of a standardized protocol tailored for this examination and automatic exposure control for dose modulation. Adaptive statistical iterative reconstruction (ASIR) was utilized. FINDINGS:  There is diffuse age-related parenchymal volume loss. The ventricles and sulci are age-appropriate without hydrocephalus. There is no mass effect or midline shift. There is no intracranial hemorrhage or extra-axial fluid collection. Scattered foci of low attenuation in the periventricular white matter represent stable chronic microvascular ischemic changes. There is no new abnormal parenchymal attenuation. The gray-white matter differentiation is maintained. The basal cisterns are patent. The osseous structures are intact. The visualized paranasal sinuses and left mastoid air cells are clear. There is patchy opacification of the right mastoid air cells. IMPRESSION: There is no acute intracranial abnormality. Xr Ribs Rt W Pa Cxr Min 3 V    Result Date: 6/27/2017  INDICATION: Ground-level fall. FINDINGS: PA view of the chest demonstrates a normal cardiomediastinal silhouette and clear lungs bilaterally. No pneumothorax or pleural effusion is seen. Three additional views of the  right ribs demonstrate no evidence of displaced rib fracture or destructive rib lesion. Post kyphoplasty changes are seen in the thoracic spine. IMPRESSION: No evidence of displaced rib fracture.

## 2017-06-28 NOTE — ED NOTES
Assumed care of patient. In NAD. A&Ox 1, baseline per EMS. Pt was found sitting on the end of her bed. Unsure if pt fell at any point, but has hx of frequent falls. Pt reports \"my entire left side hurts because I fell down the steps\" but moves all extremities. Pt has bruises to both arms, in different stages of healing. EMS states the son frequently calls 911 for respite.

## 2017-06-28 NOTE — ED NOTES
Contacted pt's son, Gabriele Hernandez. Advised him pt was ready to be discharged back to residence. He advises he is there to allow transport in. Went on at length about issues he has with providing care for patient. Home Health is being arranged through PCP. Advised him we would send a copy of all labs and imaging home with patient to take to PCP.   YEVGENIY contacted with 20 min ETA

## 2017-06-28 NOTE — ED PROVIDER NOTES
HPI Comments: 80 y.o. female with past medical history significant for rheumatoid arthritis, hypothyroidism, depression, COPD, and anemia who presents from home via EMS with chief complaint of ankle pain. Patient claims she fell down the steps yesterday. Patient complains of moderate left ankle pain, moderate left shoulder pain, and left hip pain. Patient also complains of right chest pain. Patient admits she lives alone with her son. Patient denies head pain. Per triage nurse, EMS apparently gets called for similar times multiple times. Triage nurse reports the son apparently does not take care of the patient very well. Patient denies any other sx at this time. There are no other acute medical concerns at this time. Old Chart Review: Per note, patient was here in 06/06/17 for a complaint of failure to thrive. Social hx: Tobacco Use: No, Alcohol Use: No    PCP: José Miguel Washington NP    Note written by Nga Allison, as dictated by Nehal Lam MD 9:26 PM    Full history, physical exam, and ROS unable to be obtained due to:  dementia. The history is provided by the patient. Past Medical History:   Diagnosis Date    Bowel and bladder incontinence 5/3/2017    Compression fracture     COPD (chronic obstructive pulmonary disease) ([facility] [address] 12/3/2015    Depression 7/1/2011    History of anemia 5/3/2017    [facility] 7/1/2011    Unspecified hypothyroidism 7/1/2011       History reviewed. No pertinent surgical history. Family History:   Problem Relation Age of Onset    Heart Disease Mother     Migraines Father     Lung Disease Son        Social History     Social History    Marital status:      Spouse name: N/A    Number of children: N/A    Years of education: N/A     Occupational History    Not on file.      Social History Main Topics    Smoking status: Former Smoker    Smokeless tobacco: Never Used    Alcohol use No    Drug use: No    Sexual activity: No     Other Topics Concern     Service No    Blood Transfusions No    Caffeine Concern No    Occupational Exposure No    Hobby Hazards No    Sleep Concern No    Stress Concern No    Weight Concern No    Special Diet Yes     sodt foods    Back Care Yes     compression fx per son    Exercise No    Bike Helmet No    Seat Belt Yes    Self-Exams No     Social History Narrative         ALLERGIES: Pcn [penicillins] and Zoloft [sertraline]    Review of Systems   Unable to perform ROS: Dementia   Respiratory: Positive for wheezing. Musculoskeletal: Positive for arthralgias and myalgias. Vitals:    06/27/17 2107 06/27/17 2122   BP: 137/76    Pulse: 92    Resp: 24    Temp: 98.1 °F (36.7 °C)    SpO2: 96% 96%   Weight: 77.1 kg (170 lb)    Height: 5' 1\" (1.549 m)             Physical Exam   Constitutional: She is oriented to person, place, and time. She appears well-developed and well-nourished. No distress. HENT:   Head: Normocephalic and atraumatic. Nose: Nose normal.   Eyes: Conjunctivae are normal. Pupils are equal, round, and reactive to light. No scleral icterus. Neck: Normal range of motion. Neck supple. No JVD present. No tracheal deviation present. No thyromegaly present. Cardiovascular: Normal rate, regular rhythm and normal heart sounds. No murmur heard. Pulmonary/Chest: No respiratory distress. She has wheezes. She has rales. She exhibits tenderness. Patient has wet expiratory wheezing 2/3 up the lungs on both sides. Patient does not have left chest tenderness, but has right chest wall tenderness   Abdominal: Soft. Bowel sounds are normal. She exhibits no mass. There is no tenderness. There is no rebound and no guarding. Musculoskeletal: Normal range of motion. She exhibits tenderness. She exhibits no edema. Patient has tenderness on the left shoulder, left hip, left foot   Neurological: She is alert and oriented to person, place, and time.  No cranial nerve deficit. Coordination normal.   Skin: Skin is warm and dry. No rash noted. She is not diaphoretic. No erythema. Psychiatric: She has a normal mood and affect. Her behavior is normal.   Nursing note and vitals reviewed.      Note written by Nga Olivia, as dictated by Renetta Ha MD 9:26 PM    [facility]  ED Course       Procedures

## 2017-06-28 NOTE — ED TRIAGE NOTES
Triage note: Pt arrived via EMS from her sons home where he found her sitting at the end of her hospital bed with possible left ankle pain. EMS states they are at the pt's residence frequently for similar complaints.

## 2017-06-28 NOTE — TELEPHONE ENCOUNTER
Patient has scheduled appt on 7/24/17 but she really needs to be seen sooner/asap to address these complaints. I am willing to work her in today if they can get here. Otherwise since I am on vacation until 7/10/17 she will need to be fit in sooner with a covering provider.  Th ank you

## 2017-06-28 NOTE — ED NOTES
Pt returned from X-ray. Labs and urine obtained. Pt's diaper area is extremely excoriated, red, yeast-like rash present. Additional bruises noted to abd and both lower extremities, again, in different stages of healing.

## 2017-06-30 ENCOUNTER — PATIENT OUTREACH (OUTPATIENT)
Dept: FAMILY MEDICINE CLINIC | Age: 82
End: 2017-06-30

## 2017-06-30 NOTE — PROGRESS NOTES
NNTOC-ED    Patient listed on discharge [facility]) report dated 17. Patient discharged from [facility] for dementia. Contacted patient to perform post ED/UC discharge assessment. Verified  and address with patient's sson, Enrico Batista on HIPPA as identifiers. Provided introduction to self, and explanation of the NN role. Performed medication reconciliation with Mr. Shona Bishop, and heverbalizes understanding of administration of home medications. Mr. Shona Bishop states that the patient is not taking the albuterol inhaler and Spriva due to cost.  Mr. Shona Bishop states that he will get those on Monday \"when I get paid\". Reviewed discharge instructions with Lucia Patino and he verbalizes understand of discharge instructions and follow-up care. Future Appointments  Date Time Provider Jennifer Villavicencio   2017 12:00 PM Delma Alonso MD [facility]   2017 3:40 PM [facility]ckerman  [facility]   Reviewed red flags with Mr. Shona Bishop and he verbalizes understanding. No other clinical/social/functional needs noted. Mr. Shona Bishop given an opportunity to ask questions. Contact information provided to the patient/family for future reference or further questions. Red Flags:  Call 911 anytime you think you may need emergency care. For example, call if:  · You are lost and do not know whom to call. · You are injured and do not know whom to call. Call your doctor now or seek immediate medical care if:  · You are more confused or upset than usual.  · You feel like you could hurt yourself because your mind is not working well.

## 2017-07-18 ENCOUNTER — TELEPHONE (OUTPATIENT)
Dept: FAMILY MEDICINE CLINIC | Age: 82
End: 2017-07-18

## 2017-07-18 NOTE — TELEPHONE ENCOUNTER
Outbound call to RN Alli Ni w/[facility].  Identified self, role and nature of the call, RN acknowledged understanding. Clinician inquired about the status of the referral for the At [facility].  RN reports to clinician \"as of last week Dr. Daphne Vasquez, still isn't credentialed w/[employer]. The referral is still pending. \"     RN agreed to notify clinician w/updates and when assessment visit by physician has been done.       TYSON Mckenzie

## 2017-07-19 ENCOUNTER — TELEPHONE (OUTPATIENT)
Dept: PALLATIVE CARE | Age: 82
End: 2017-07-19

## 2017-07-19 ENCOUNTER — TELEPHONE (OUTPATIENT)
Dept: FAMILY MEDICINE CLINIC | Age: 82
End: 2017-07-19

## 2017-07-19 NOTE — TELEPHONE ENCOUNTER
Outbound call to Pro Monreal w/[employer]. Voice message left w/direct contact information requesting a return phone call.     Marissa Reyna of the call: available funding for respite care program.    TYSON Winter

## 2017-07-19 NOTE — TELEPHONE ENCOUNTER
Nurse called and spoke with son/caregiver Mavis Baker to give update on status of Home Based Primary Care referral.  Two pt identifiers verified. Mavis Baker says his mother was discharged from [facility] several weeks ago \"because she was uncooperative. \" He has been caring for her at home and says that for now things are going pretty well. He says that last week he was having leg cramps and thought he might have \"mold poisoning\" from dampness in his home due to a water leak in the past.   He reports that a representative from [facility] made a home visit last week and told him Ms. Patino would qualify for a week of respite if he needs to go to the doctor. He said he could not get an appt for a physical until Oct. 2017, however this week he is \"feeling fine\". Nurse explained that we are still waiting for Dr. Tobe Homans credentialing with [facility] to be approved and that is why [facility] hasn't made a home visit yet. Mavis Baker is receptive to [facility] and voices understanding that until [facility] comes to the home and admits Ms. Patino to our services, Noemi Heard is still pt's provider. Plan:  Once Dr. Autumn Stiles is approved as an in-network provider for [facility], we will schedule a home visit.

## 2017-07-20 NOTE — TELEPHONE ENCOUNTER
Inbound call and voice message left w/clinician. Voice message states \"we don't have funding for respite care at an outside facilities. When there is funding available for respite care, it's for person's w/Dementia or related disorder and need personal care. Respite care is done in the individuals home; or at an Adult day care but again we don't provide funding for respite care to any outside agency\".       TYSON Forrest

## 2017-08-15 ENCOUNTER — TELEPHONE (OUTPATIENT)
Dept: FAMILY MEDICINE CLINIC | Age: 82
End: 2017-08-15

## 2017-08-15 NOTE — TELEPHONE ENCOUNTER
Leonarda Cisse  [phone]    Patient's son, Elizabeth Garcia, states that he needs for our office to set up Restit care. Sharlene states that she is qualified but our office needs to authorize it.

## 2017-08-17 NOTE — TELEPHONE ENCOUNTER
Called and lm for son Zuleyka Benitez that patient will need to come in for an apt. To see a provider. Will need to be evaluated to be sure she meets requirements and that we have necessary documentation needed for Respite referral. Will wait on return call.

## 2017-08-18 NOTE — TELEPHONE ENCOUNTER
Spoke with son, informed me that he has spoken to nurse already and given an appointment. September 1, 2017.

## 2017-08-18 NOTE — TELEPHONE ENCOUNTER
Patient is calling in regards to a missed call from Aurora Abdi, tried contacting nurse no success.     Best call back # for [phone]

## 2017-08-18 NOTE — TELEPHONE ENCOUNTER
Spoke with patients son Winston Jordan. Patients son advised that his mother will need an appointment. Patients son states that they won't be able to come in for an appointment until after Sept 1,2017. Patient son states that he will call back that day to schedule an appointment for his mom.

## 2017-08-21 ENCOUNTER — TELEPHONE (OUTPATIENT)
Dept: FAMILY MEDICINE CLINIC | Age: 82
End: 2017-08-21

## 2017-08-21 NOTE — TELEPHONE ENCOUNTER
----- Message from Hugh Thomas sent at 8/19/2017  2:32 PM EDT -----  Regarding: LUDWIG Jackson/ telephone   Pt's son Portillo Catalan is returning a call back, reason not disclosed. Best contact number [phone].

## 2017-08-21 NOTE — TELEPHONE ENCOUNTER
Call to patient's son roxane verified on hippa. He informed me he had a visit from [facility] for his mother. Sharlene recommended respit care x 2 weeks-they would remove her from home and take care of her. 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 He states Sharlene is requesting an order be signed by Dr. Марина Elmore. Advised sharlene needs to send us order and then we can sign if provider approves. Gave patient fax number to our office so order can be faxed.  Advised if he has any questions or if anything changes to call office back

## 2017-11-05 RX ORDER — LEVOTHYROXINE SODIUM 75 UG/1
75 TABLET ORAL
Qty: 30 TAB | Refills: 2 | Status: SHIPPED | OUTPATIENT
Start: 2017-11-05

## 2017-11-09 DIAGNOSIS — J44.9 CHRONIC OBSTRUCTIVE PULMONARY DISEASE, UNSPECIFIED COPD TYPE (HCC): ICD-10-CM

## 2017-11-09 RX ORDER — IPRATROPIUM BROMIDE AND ALBUTEROL SULFATE 2.5; .5 MG/3ML; MG/3ML
3 SOLUTION RESPIRATORY (INHALATION)
Qty: 270 ML | Refills: 2 | Status: SHIPPED | OUTPATIENT
Start: 2017-11-09

## 2017-12-18 ENCOUNTER — TELEPHONE (OUTPATIENT)
Dept: FAMILY MEDICINE CLINIC | Age: 82
End: 2017-12-18

## 2017-12-18 NOTE — TELEPHONE ENCOUNTER
Eliazar Her     -   [phone]  SON        Patient's son is calling about letter -  Requesting to speak with regarding his brother and requesting letter to be mailed

## 2017-12-18 NOTE — TELEPHONE ENCOUNTER
Son called back again now he is stated he is coming to pick the letter up and will speak with the nurse-

## 2017-12-21 NOTE — TELEPHONE ENCOUNTER
----- Message from Jasmin Jolly sent at 12/21/2017 10:09 AM EST -----  Regarding: Dr. Madison Escobar  The patient is requesting a call back to discuss the status of the respite paperwork that was supposed to be sent.  (f)[phone]

## 2017-12-27 NOTE — TELEPHONE ENCOUNTER
Outgoing call to María Chisholm. No answer. Call goes straight to voicemail which is not set up.  Have not received paperwork

## 2017-12-27 NOTE — TELEPHONE ENCOUNTER
----- Message from Audra Bacon sent at 12/22/2017  3:41 PM EST -----  Regarding: Dr. Oren Shafer, son, is requesting a call back to know if the brother, Wayne bowser, sent over forms for Power of  and respite care for pt. If the forms were not received, Pipe Munoz will resubmit POA and respite care forms. Pipe Munoz would like a call back to know if he should p/up forms and fill them out. Pipe Munoz call back [phone].

## 2018-01-11 ENCOUNTER — TELEPHONE (OUTPATIENT)
Dept: FAMILY MEDICINE CLINIC | Age: 83
End: 2018-01-11

## 2018-01-11 NOTE — TELEPHONE ENCOUNTER
KALE for return call. Received fax from pt's son for respite care. Pt is going to need to see new PCP since Carmen East no longer here. I am calling to let Keon Merritt know. Dr Marilin Babin isn't taking on new pt's at this time.
90

## 2018-12-04 NOTE — PROGRESS NOTES
Hospitalist Progress Note  Luci Cotton MD  Office: [phone]  Cell: [phone]      Date of Service:  6/10/2017  NAME:  Shira Fu  :  1929  MRN:  [id number]      Admission Summary:   Shira Fu is a 80 y.o. female who presents with PMHx of COPD, Hypothyroidism, Hx of compression fracture, who was just seen at [facility] for back pain and released back home. Pt unable to provide any meaningful history due to confusion and probable underlying Dementia. Per records, ems was called for back pain. On arrival, patient covered with stool B/L LE and buttocks and in nails. Pt unable to recall if she had a fall and how long she was on the floor. Pt states she has back pain which is worse when she lays on the back, no radiation of pain. Pt unable to describe or quantify the pain. Pt lives with her son at home. No reports of fever, chills, syncope, nausea, vomiting, diarrhea, abdominal pain, chest pain, or sob. Interval history / Subjective:   Back pain better, she said she wants to go home,      Assessment & Plan:     Back pain likely related to multiple compression fracture  -s/p Kyphoplast T6 and T10 by IR on   -MRI Thoracic on  acute mild/moderate compression fractures at T6 and T10. No evidence of extension into the posterior elements. No significant bulging/retropulsion, chronic compression fractures are noted at T8, T9, and T12, multilevel degenerative changes described above. -MRI Lumbar spine acute vertebral compression deformity along the superior endplate of L58 without cortical retropulsion or epidural hematoma.  mild multilevel disc and facet degenerative change with minimal  anterolisthesis of L5 on S1, mild canal and right foraminal stenosis at L4-5, mild bilateral foraminal stenoses L3-4.  - continue Tylenol and Tramadol for Pain   - PT/OT      Metabolic Encephalopathy superimposed on underlying mild Telephone Encounter by Gretta Yousif at 08/07/18 09:10 AM     Author:  Gretta Yousif Service:  (none) Author Type:  Patient      Filed:  08/07/18 09:11 AM Encounter Date:  8/6/2018 Status:  Signed     :  Gretta Yousif (Patient )            Left message on answering machine to call back.[KL1.1T]  (Dr Owens is out of the office until 8/10).[KL1.1M]      Revision History        User Key Date/Time User Provider Type Action    > KL1.1 08/07/18 09:11 AM Gretta Yousif Patient  Sign    M - Manual, T - Template             dementia  -CT Head no acute process  - likely related to dehydration  - continue with normal saline       Leukocytosis due to dehydration  -no signs of infection at this time  -resolved      Hypothyroidism  -continue with Synthroid     COPD   -stable  -SaO2 96% on RA  -continue pulmicort,prn duo neb        Code status: Full Code  DVT prophylaxis: heparin    Care Plan discussed with: Patient/Family, Nurse and   Disposition: SNF   Son at bedside, questions answered     Hospital Problems  Date Reviewed: 5/3/2017          Codes Class Noted POA    Metabolic encephalopathy [facility]-ED: G93.41  ICD-9-CM: 348.31  6/6/2017 Yes        Rhabdomyolysis ICD-10-CM: M62.82  ICD-9-CM: 728.88  6/6/2017 Yes        Dehydration ICD-10-CM: E86.0  ICD-9-CM: 276.51  6/6/2017 Yes        * (Principal)Compression fracture of body of thoracic vertebra ([address] ICD-10-CM: M48.54XA  ICD-9-CM: 733.13  6/6/2017 Yes                Vital Signs:    Last 24hrs VS reviewed since prior progress note. Most recent are:  Visit Vitals    /72 (BP 1 Location: Left arm, BP Patient Position: At rest)    Pulse 84    Temp 97.6 °F (36.4 °C)    Resp 15    Ht 5' 1\" (1.549 m)    Wt 77.4 kg (170 lb 10.2 oz)    SpO2 96%    BMI 32.24 kg/m2         Intake/Output Summary (Last 24 hours) at 06/10/17 1603  Last data filed at 06/10/17 1022   Gross per 24 hour   Intake              100 ml   Output                0 ml   Net              100 ml        Physical Examination:             Constitutional:  No acute distress, cooperative, pleasant    ENT:  Oral mucous moist, oropharynx benign. Neck supple,    Resp:  CTA bilaterally. No wheezing/rhonchi/rales. No accessory muscle use   CV:  Regular rhythm, normal rate, no murmurs, gallops, rubs    GI:  Soft, non distended, non tender.  normoactive bowel sounds, no hepatosplenomegaly     Musculoskeletal:  No edema    Neurologic:  Conscious and alert, answer simple questions and follows simple commands, motor UE 5/5, LE 2/5     Skin:  no [facility]       Data Review:    Review and/or order of clinical lab test      Labs:     Recent Labs      06/10/17   0159   WBC  6.3   HGB  10.7*   HCT  35.1   PLT  211     No results for input(s): NA, K, CL, CO2, BUN, CREA, GLU, CA, MG, PHOS, URICA in the last 72 hours. No results for input(s): SGOT, GPT, ALT, AP, TBIL, TBILI, TP, ALB, GLOB, GGT, AML, LPSE in the last 72 hours. No lab exists for component: AMYP, HLPSE  No results for input(s): INR, PTP, APTT in the last 72 hours. No lab exists for component: INREXT, INREXT   No results for input(s): FE, TIBC, PSAT, FERR in the last 72 hours. Lab Results   Component Value Date/Time    Folate 17.7 06/10/2016 04:33 PM      No results for input(s): PH, PCO2, PO2 in the last 72 hours. No results for input(s): CPK, CKNDX, TROIQ in the last 72 hours.     No lab exists for component: CPKMB  Lab Results   Component Value Date/Time    Cholesterol, total 174 06/10/2016 04:33 PM    HDL Cholesterol 62 06/10/2016 04:33 PM    LDL, calculated 86 06/10/2016 04:33 PM    Triglyceride 128 06/10/2016 04:33 PM     Lab Results   Component Value Date/Time    Glucose (POC) 146 10/10/2014 11:45 AM    Glucose (POC) 176 10/10/2014 07:24 AM    Glucose (POC) 194 10/09/2014 09:17 PM    Glucose (POC) 185 10/09/2014 06:50 PM     Lab Results   Component Value Date/Time    Color DARK YELLOW 06/06/2017 01:05 PM    Appearance CLOUDY 06/06/2017 01:05 PM    Specific gravity 1.030 06/06/2017 01:05 PM    Specific gravity 1.030 05/11/2017 01:59 PM    pH (UA) 6.0 06/06/2017 01:05 PM    Protein 30 06/06/2017 01:05 PM    Glucose NEGATIVE  06/06/2017 01:05 PM    Ketone 40 06/06/2017 01:05 PM    Bilirubin NEGATIVE  10/09/2014 02:17 PM    Urobilinogen 1.0 06/06/2017 01:05 PM    Nitrites NEGATIVE  06/06/2017 01:05 PM    Leukocyte Esterase NEGATIVE  06/06/2017 01:05 PM    Epithelial cells FEW 06/06/2017 01:05 PM    Bacteria NEGATIVE  06/06/2017 01:05 PM    WBC 0-4 06/06/2017 01:05 PM    RBC 0-5 06/06/2017 01:05 PM         Medications Reviewed:     Current Facility-Administered Medications   Medication Dose Route Frequency    sodium chloride (NS) flush 5-10 mL  5-10 mL IntraVENous Q8H    sodium chloride (NS) flush 5-10 mL  5-10 mL IntraVENous PRN    0.9% sodium chloride infusion  50 mL/hr IntraVENous CONTINUOUS    albuterol-ipratropium (DUO-NEB) 2.5 MG-0.5 MG/3 ML  3 mL Nebulization Q6H PRN    FLUoxetine (PROzac) capsule 10 mg  10 mg Oral DAILY    levothyroxine (SYNTHROID) tablet 75 mcg  75 mcg Oral ACB    therapeutic multivitamin (THERAGRAN) tablet 1 Tab  1 Tab Oral DAILY    nystatin (MYCOSTATIN) 100,000 unit/gram cream   Topical BID    sodium chloride (NS) flush 5-10 mL  5-10 mL IntraVENous Q8H    sodium chloride (NS) flush 5-10 mL  5-10 mL IntraVENous PRN    acetaminophen (TYLENOL) tablet 650 mg  650 mg Oral Q4H PRN    ondansetron (ZOFRAN) injection 4 mg  4 mg IntraVENous Q4H PRN    docusate sodium (COLACE) capsule 100 mg  100 mg Oral BID    heparin (porcine) injection 5,000 Units  5,000 Units SubCUTAneous Q8H    traMADol-acetaminophen (ULTRACET) per tablet 1 Tab  1 Tab Oral Q8H    pantoprazole (PROTONIX) tablet 40 mg  40 mg Oral ACB    arformoterol (BROVANA) neb solution 15 mcg  15 mcg Nebulization BID RT    And    budesonide (PULMICORT) 500 mcg/2 ml nebulizer suspension  500 mcg Nebulization BID RT     ______________________________________________________________________  EXPECTED LENGTH OF STAY: 5d 12h  ACTUAL LENGTH OF STAY:          1                 Chapincito Wood MD

## 2022-08-22 NOTE — PROGRESS NOTES
Addendum  created 08/22/22 1704 by Deysi Robbins MD    Intraprocedure Meds edited, Orders acknowledged in Narrator       History of Present Illness:   Gian Floyd is an 80year-old white female with past medical history significant for COPD, hypothyroidism and was recently discharged from [facility] for back pain and compression fractures of T6 and T10. She underwent kyphoplasty there. She was released back home, but she came back to [facility] with increased confusion. She was covered with stool throughout her buttock and she was unable to recall if she had a fall and how long she was on the floor. She continued to complain of back pain too. The patient lives with her son at home. She denies any history of fever, chills, nausea or vomiting. She was admitted to the hospital for her back pain. She was on pain management, the patient had kyphoplasty. Her metabolic encephalopathy was probably due to dementia. Blood work was done, no sign of infection was found. She has COPD. She was wheezing and coughing, so she received Z-Brando to control her cough and COPD. She was stabilized and was transferred to [facility]. I am seeing her here in the rehab. She is doing well. She mentioned her back pain has subsided right now. Past Medical History:   1. COPD. 2. Compression fractures. 3. Bowel and bladder incontinence. 4. Depression. 5. History of anemia. 6. Rheumatoid arthritis. 7. Hypothyroidism. Past Surgical History:   Unknown. Family History: Mother has heart disease. Son has lung disease. Social History:  The patient is . The patient is a former smoker. She never drank any alcohol. She is living with her son right now. Allergies:  She is allergic to Penicillin and Zoloft.      Medications:  Colace 100 mg two capsules twice a day, Levaquin 750 mg one tablet every day, MiraLax 17 grams p.o. daily as needed, Prednisone 10 mg starting with four tablets daily and decrease by 10 mg every three days and then stop, Florastor 250 mg one capsule twice daily for seven days, Tramadol/APAP 37.5/325 mg one tablet every eight hours scheduled for pain, Albuterol inhaler two puffs every six hours as needed, DuoNeb nebulizer every six hours as needed, Baclofen 10 mg twice daily as needed, Fluoxetine 10 mg one tablet every day, Advair 250/50 one puff twice a day, Levothyroxine 75 mcg once every day, multivitamin one tablet every day, Omeprazole 20 mg once daily, Nystatin topical cream twice a day apply under the breasts, Spiriva every day. Review of Systems:  A complete review of systems was done. Right now, essentially negative. Physical Examination:    GENERAL:  This is a pleasant white female not appearing in any distress. VITALS:  T:  98.8 degrees Fahrenheit. P:  85 per minute. BP:  114/55 mmHg. SaO2:  95% on room air. Weight is 139.2 pounds. HEENT:  No abnormality detected. NECK:  Supple, no JVD, no carotid bruits, no thyromegaly. CHEST:  Chest is clear to auscultation bilaterally. No rales, no rhonchi. CARDIOVASCULAR:  S1, S2 normal.  Regular rate and rhythm. ABDOMEN:  Soft, nontender, nondistended, bowel sounds present. EXTREMITIES:  No edema is noted. Dorsalis pedis pulse normal with equal flow. NEUROLOGICAL:  Alert and oriented x2. Mild to moderate dementia is present. Cranial nerves II - XII grossly intact. Motor is 5/5 bilaterally. Sensory is within normal limits. Assessment and Plan:   1. Metabolic encephalopathy. underlying mild dementia. CT of the head was done, which was negative. It is likely due to dehydration and confusion has improved after IV fluids. 2. COPD. Patient is on Advair and DuoNeb nebulizer. She was coughing, so she was started on Levofloxacin. Cough is better. 3. Recent compression fracture, status post kyphoplasty T6 and T10 level. The patient is on Ultracet every eight hours schedule and Tramadol 50 mg every six hours as needed for pain. She mentioned she is pain free right now.   She will continue physical therapy and occupational therapy. 4. Hypothyroidism. She is on Synthroid. We will continue it for now. 5. Depression. She is on Prozac. We will continue it. 6. Possible mild to moderate dementia. She is not on medications. She needs further evaluation by a neurologist.   7. Gait weakness. She will continue physical therapy and occupational therapy here. THIS IS NOT A COMPLETE MEDICAL RECORD ON THIS PATIENT.    THIS IS A PATIENT AT [facility].    PLEASE CONTACT THE FACILITY LISTED BELOW FOR MORE INFORMATION ON THIS PATIENT.    THE COMPLETE RECORD RESIDES WITH THIS LONG TERM CARE Inocente Ramos MD